# Patient Record
Sex: MALE | Race: ASIAN | Employment: FULL TIME | URBAN - METROPOLITAN AREA
[De-identification: names, ages, dates, MRNs, and addresses within clinical notes are randomized per-mention and may not be internally consistent; named-entity substitution may affect disease eponyms.]

---

## 2017-04-11 ENCOUNTER — OFFICE VISIT (OUTPATIENT)
Dept: FAMILY MEDICINE | Facility: CLINIC | Age: 42
End: 2017-04-11
Payer: COMMERCIAL

## 2017-04-11 VITALS
HEART RATE: 74 BPM | OXYGEN SATURATION: 97 % | DIASTOLIC BLOOD PRESSURE: 84 MMHG | SYSTOLIC BLOOD PRESSURE: 132 MMHG | WEIGHT: 242 LBS | TEMPERATURE: 97.2 F | BODY MASS INDEX: 40.32 KG/M2 | RESPIRATION RATE: 16 BRPM | HEIGHT: 65 IN

## 2017-04-11 DIAGNOSIS — I10 ESSENTIAL HYPERTENSION: ICD-10-CM

## 2017-04-11 DIAGNOSIS — R73.03 PREDIABETES: ICD-10-CM

## 2017-04-11 DIAGNOSIS — I10 ESSENTIAL HYPERTENSION, BENIGN: ICD-10-CM

## 2017-04-11 DIAGNOSIS — Z13.6 CARDIOVASCULAR SCREENING; LDL GOAL LESS THAN 130: ICD-10-CM

## 2017-04-11 DIAGNOSIS — M77.02 GOLFERS ELBOW OF LEFT UPPER EXTREMITY: ICD-10-CM

## 2017-04-11 DIAGNOSIS — E66.01 MORBID OBESITY, UNSPECIFIED OBESITY TYPE (H): ICD-10-CM

## 2017-04-11 DIAGNOSIS — M25.542 ARTHRALGIA OF LEFT HAND: ICD-10-CM

## 2017-04-11 DIAGNOSIS — Z71.6 ENCOUNTER FOR SMOKING CESSATION COUNSELING: ICD-10-CM

## 2017-04-11 DIAGNOSIS — Z72.0 TOBACCO ABUSE: ICD-10-CM

## 2017-04-11 DIAGNOSIS — R80.9 POSITIVE FOR MACROALBUMINURIA: ICD-10-CM

## 2017-04-11 DIAGNOSIS — Z00.00 ENCOUNTER FOR ROUTINE ADULT HEALTH EXAMINATION WITHOUT ABNORMAL FINDINGS: Primary | ICD-10-CM

## 2017-04-11 DIAGNOSIS — L81.0 HYPERPIGMENTATION OF SKIN, POSTINFLAMMATORY: ICD-10-CM

## 2017-04-11 LAB — HBA1C MFR BLD: 6 % (ref 4.3–6)

## 2017-04-11 PROCEDURE — 80061 LIPID PANEL: CPT | Performed by: PHYSICIAN ASSISTANT

## 2017-04-11 PROCEDURE — 99212 OFFICE O/P EST SF 10 MIN: CPT | Mod: 25 | Performed by: PHYSICIAN ASSISTANT

## 2017-04-11 PROCEDURE — 83036 HEMOGLOBIN GLYCOSYLATED A1C: CPT | Performed by: PHYSICIAN ASSISTANT

## 2017-04-11 PROCEDURE — 36415 COLL VENOUS BLD VENIPUNCTURE: CPT | Performed by: PHYSICIAN ASSISTANT

## 2017-04-11 PROCEDURE — 99396 PREV VISIT EST AGE 40-64: CPT | Performed by: PHYSICIAN ASSISTANT

## 2017-04-11 PROCEDURE — 82043 UR ALBUMIN QUANTITATIVE: CPT | Performed by: PHYSICIAN ASSISTANT

## 2017-04-11 PROCEDURE — 80048 BASIC METABOLIC PNL TOTAL CA: CPT | Performed by: PHYSICIAN ASSISTANT

## 2017-04-11 PROCEDURE — 84550 ASSAY OF BLOOD/URIC ACID: CPT | Performed by: PHYSICIAN ASSISTANT

## 2017-04-11 RX ORDER — ATENOLOL 25 MG/1
25 TABLET ORAL DAILY
Qty: 90 TABLET | Refills: 3 | Status: SHIPPED | OUTPATIENT
Start: 2017-04-11 | End: 2017-08-17

## 2017-04-11 RX ORDER — AMLODIPINE BESYLATE 5 MG/1
5 TABLET ORAL DAILY
Qty: 90 TABLET | Refills: 3 | Status: SHIPPED | OUTPATIENT
Start: 2017-04-11 | End: 2018-05-02

## 2017-04-11 RX ORDER — VARENICLINE TARTRATE 1 MG/1
1 TABLET, FILM COATED ORAL 2 TIMES DAILY
Qty: 56 TABLET | Refills: 3 | Status: SHIPPED | OUTPATIENT
Start: 2017-04-11 | End: 2018-06-25

## 2017-04-11 RX ORDER — LOSARTAN POTASSIUM 50 MG/1
50 TABLET ORAL DAILY
Qty: 90 TABLET | Refills: 3 | Status: SHIPPED | OUTPATIENT
Start: 2017-04-11 | End: 2017-04-14 | Stop reason: DRUGHIGH

## 2017-04-11 RX ORDER — MULTIPLE VITAMINS W/ MINERALS TAB 9MG-400MCG
1 TAB ORAL DAILY
COMMUNITY
End: 2018-06-25

## 2017-04-11 NOTE — MR AVS SNAPSHOT
After Visit Summary   4/11/2017    Efrain Lock    MRN: [id number]           Patient Information     Date Of Birth          1975        Visit Information        Provider Department      4/11/2017 11:00 AM Negin Patel PA-C [facility]        Today's Diagnoses     CARDIOVASCULAR SCREENING; LDL GOAL LESS THAN 130    -  1    Essential hypertension, benign        Positive for macroalbuminuria        Prediabetes        Essential hypertension        Encounter for smoking cessation counseling        Tobacco abuse        Arthralgia of left hand          Care Instructions      Preventive Health Recommendations  Male Ages 40 to 49    Yearly exam:             See your health care provider every year in order to  o   Review health changes.   o   Discuss preventive care.    o   Review your medicines if your doctor has prescribed any.    You should be tested each year for STDs (sexually transmitted diseases) if you re at risk.     Have a cholesterol test every 5 years.     Have a colonoscopy (test for colon cancer) if someone in your family has had colon cancer or polyps before age 50.     After age 45, have a diabetes test (fasting glucose). If you are at risk for diabetes, you should have this test every 3 years.      Talk with your health care provider about whether or not a prostate cancer screening test (PSA) is right for you.    Shots: Get a flu shot each year. Get a tetanus shot every 10 years.     Nutrition:    Eat at least 5 servings of fruits and vegetables daily.     Eat whole-grain bread, whole-wheat pasta and brown rice instead of white grains and rice.     Talk to your provider about Calcium and Vitamin D.     Lifestyle    Exercise for at least 150 minutes a week (30 minutes a day, 5 days a week). This will help you control your weight and prevent disease.     Limit alcohol to one drink per day.     No smoking.     Wear sunscreen to prevent skin cancer.     See your  "dentist every six months for an exam and cleaning.            Follow-ups after your visit        Who to contact     If you have questions or need follow up information about today's clinic visit or your schedule please contact [facility] [place] directly at [phone].  Normal or non-critical lab and imaging results will be communicated to you by MyChart, letter or phone within 4 business days after the clinic has received the results. If you do not hear from us within 7 days, please contact the clinic through [employer] or phone. If you have a critical or abnormal lab result, we will notify you by phone as soon as possible.  Submit refill requests through [employer] or call your pharmacy and they will forward the refill request to us. Please allow 3 business days for your refill to be completed.          Additional Information About Your Visit        MyChart Information     [employer] gives you secure access to your electronic health record. If you see a primary care provider, you can also send messages to your care team and make appointments. If you have questions, please call your primary care clinic.  If you do not have a primary care provider, please call [phone] and they will assist you.        Care EveryWhere ID     This is your Care EveryWhere ID. This could be used by other organizations to access your [facility] medical records  [id number]        Your Vitals Were     Pulse Temperature Respirations Height Pulse Oximetry BMI (Body Mass Index)    74 97.2  F (36.2  C) 16 5' 5\" (1.651 m) 97% 40.27 kg/m2       Blood Pressure from Last 3 Encounters:   04/11/17 132/84   12/05/16 138/88   07/11/16 132/84    Weight from Last 3 Encounters:   04/11/17 242 lb (109.8 kg)   12/05/16 246 lb 3.2 oz (111.7 kg)   07/11/16 239 lb (108.4 kg)              We Performed the Following     Albumin Random Urine Quantitative     Basic metabolic panel  (Ca, Cl, CO2, Creat, Gluc, K, Na, BUN)     Hemoglobin A1c     Lipid " Profile with reflex to direct LDL     Uric acid          Where to get your medicines      These medications were sent to [id number] - [place], [place] - [address] AT [facility] [address], [place] [address]     Phone:  [phone]     amLODIPine 5 MG tablet    atenolol 25 MG tablet    losartan 50 MG tablet    varenicline 0.5 MG X 11 & 1 MG X 42 tablet    varenicline 1 MG tablet          Primary Care Provider Office Phone # Fax #    Negin Patel PA-C [phone]       [facility] [address]  [place] [address]        Thank you!     [facility]. Our goal is always to provide you with excellent care. Hearing back from our patients is one way we can continue to improve our services. Please take a few minutes to complete the written survey that you may receive in the mail after your visit with us. Thank you!             Your Updated Medication List - Protect others around you: Learn how to safely use, store and throw away your medicines at www.disposemymeds.org.          This list is accurate as of: 4/11/17 11:12 AM.  Always use your most recent med list.                   Brand Name Dispense Instructions for use    amLODIPine 5 MG tablet    NORVASC    90 tablet    Take 1 tablet (5 mg) by mouth daily       atenolol 25 MG tablet    TENORMIN    90 tablet    Take 1 tablet (25 mg) by mouth daily       losartan 50 MG tablet    COZAAR    90 tablet    Take 1 tablet (50 mg) by mouth daily       Multi-vitamin Tabs tablet      Take 1 tablet by mouth daily       POTASSIUM CITRATE PO          * varenicline 1 MG tablet    CHANTIX    56 tablet    Take 1 tablet (1 mg) by mouth 2 times daily       * varenicline 0.5 MG X 11 & 1 MG X 42 tablet    CHANTIX STARTING MONTH DOROTHY    53 tablet    Take 0.5 mg tab daily for 3 days, then 0.5 mg tab twice daily for 4 days, then 1 mg twice daily.        * Notice:  This list has 2 medication(s) that are the same as other medications prescribed for you. Read the directions carefully, and ask your doctor or other care provider to review them with you.

## 2017-04-11 NOTE — PROGRESS NOTES
"Chief Complaint   Patient presents with     Physical       Initial /84  Pulse 74  Temp 97.2  F (36.2  C)  Resp 16  Ht 5' 5\" (1.651 m)  Wt 242 lb (109.8 kg)  SpO2 97%  BMI 40.27 kg/m2 Estimated body mass index is 40.27 kg/(m^2) as calculated from the following:    Height as of this encounter: 5' 5\" (1.651 m).    Weight as of this encounter: 242 lb (109.8 kg).  Medication Reconciliation: complete. PABLO Melara LPN      SUBJECTIVE:     CC: Efrain Lock is an 41 year old male who presents for preventative health visit.     Healthy Habits:    Do you get at least three servings of calcium containing foods daily (dairy, green leafy vegetables, etc.)? yes    Amount of exercise or daily activities, outside of work: 3-4 day(s) per week    Problems taking medications regularly No    Medication side effects: No    Have you had an eye exam in the past two years? yes    Do you see a dentist twice per year? yes    Do you have sleep apnea, excessive snoring or daytime drowsiness?  Does snore    Interested in Chantix again. He quit with it, but then didn't get the refill and started back up. Wants to try again.     Says left hand has been sore - wants uric acid level checked.     Left medial elbow pain - has a while back, got better with injections, but has been lifting weights a lot and it flared it up.       -------------------------------------    Today's PHQ-2 Score:   PHQ-2 ( 1999 Pfizer) 4/11/2017 10/22/2015   Q1: Little interest or pleasure in doing things 0 0   Q2: Feeling down, depressed or hopeless 0 0   PHQ-2 Score 0 0       Abuse: Current or Past(Physical, Sexual or Emotional)- No  Do you feel safe in your environment - Yes    Social History   Substance Use Topics     Smoking status: Current Every Day Smoker     Packs/day: 0.30     Types: Cigarettes     Last attempt to quit: 7/22/2013     Smokeless tobacco: Never Used     Alcohol use No     The patient does not drink >3 drinks per day nor >7 drinks per " week.    Last PSA: No results found for: PSA    Recent Labs   Lab Test  10/24/14   1036  10/29/13   1026   CHOL  219*  242*   HDL  39*  38*   LDL  137*  142*   TRIG  213*  311*   CHOLHDLRATIO  5.6*  6.4*       Reviewed orders with patient. Reviewed health maintenance and updated orders accordingly - Yes    PABLO Melara LPN        Reviewed and updated as needed this visit by clinical staff  Tobacco  Allergies  Meds  Fam Hx  Soc Hx        Reviewed and updated as needed this visit by Provider            ROS:  C: NEGATIVE for fever, chills, change in weight  I: NEGATIVE for worrisome rashes, moles or lesions  E: NEGATIVE for vision changes or irritation  ENT: NEGATIVE for ear, mouth and throat problems  R: NEGATIVE for significant cough or SOB  CV: NEGATIVE for chest pain, palpitations or peripheral edema  GI: NEGATIVE for nausea, abdominal pain, heartburn, or change in bowel habits   male: negative for dysuria, hematuria, decreased urinary stream, erectile dysfunction, urethral discharge  M: SEE ABOVE.   N: NEGATIVE for weakness, dizziness or paresthesias  P: NEGATIVE for changes in mood or affect    Problem list, Medication list, Allergies, and Medical/Social/Surgical histories reviewed in [facility] and updated as appropriate.  Labs reviewed in EPIC  BP Readings from Last 3 Encounters:   04/11/17 132/84   12/05/16 138/88   07/11/16 132/84    Wt Readings from Last 3 Encounters:   04/11/17 242 lb (109.8 kg)   12/05/16 246 lb 3.2 oz (111.7 kg)   07/11/16 239 lb (108.4 kg)                  Patient Active Problem List   Diagnosis     Tobacco abuse     Essential hypertension, benign     Hyperpigmentation of skin, postinflammatory     Positive for macroalbuminuria     CARDIOVASCULAR SCREENING; LDL GOAL LESS THAN 130     Medial epicondylitis of both elbows     Radial styloid tenosynovitis of right hand     Right wrist pain     Prediabetes     BMI > 40     Past Surgical History:   Procedure Laterality Date     SURGICAL HISTORY  "OF -       rt knee surgery       Social History   Substance Use Topics     Smoking status: Current Every Day Smoker     Packs/day: 0.30     Types: Cigarettes     Last attempt to quit: 7/22/2013     Smokeless tobacco: Never Used     Alcohol use No     Family History   Problem Relation Age of Onset     Prostate Cancer Father      Hypertension Paternal Grandmother      Colon Cancer No family hx of      DIABETES No family hx of      Myocardial Infarction No family hx of          Current Outpatient Prescriptions   Medication Sig Dispense Refill     losartan (COZAAR) 100 MG tablet Take 1 tablet (100 mg) by mouth daily 90 tablet 3     multivitamin, therapeutic with minerals (MULTI-VITAMIN) TABS tablet Take 1 tablet by mouth daily       POTASSIUM CITRATE PO        atenolol (TENORMIN) 25 MG tablet Take 1 tablet (25 mg) by mouth daily 90 tablet 3     amLODIPine (NORVASC) 5 MG tablet Take 1 tablet (5 mg) by mouth daily 90 tablet 3     varenicline (CHANTIX) 1 MG tablet Take 1 tablet (1 mg) by mouth 2 times daily 56 tablet 3     varenicline (CHANTIX STARTING MONTH DOROTHY) 0.5 MG X 11 & 1 MG X 42 tablet Take 0.5 mg tab daily for 3 days, then 0.5 mg tab twice daily for 4 days, then 1 mg twice daily. 53 tablet 0     [DISCONTINUED] losartan (COZAAR) 50 MG tablet Take 1 tablet (50 mg) by mouth daily 90 tablet 3     Allergies   Allergen Reactions     Lisinopril Cough     OBJECTIVE:     /84  Pulse 74  Temp 97.2  F (36.2  C)  Resp 16  Ht 5' 5\" (1.651 m)  Wt 242 lb (109.8 kg)  SpO2 97%  BMI 40.27 kg/m2  EXAM:  GENERAL: healthy, alert and no distress  EYES: Eyes grossly normal to inspection, PERRL and conjunctivae and sclerae normal  HENT: ear canals and TM's normal, nose and mouth without ulcers or lesions  NECK: no adenopathy, no asymmetry, masses, or scars and thyroid normal to palpation  RESP: lungs clear to auscultation - no rales, rhonchi or wheezes  BREAST: normal without masses, tenderness or nipple discharge and no " palpable axillary masses or adenopathy  CV: regular rate and rhythm, normal S1 S2, no S3 or S4, no murmur, click or rub, no peripheral edema and peripheral pulses strong  ABDOMEN: soft, nontender, no hepatosplenomegaly, no masses and bowel sounds normal  MS: no gross musculoskeletal defects noted, no edema  SKIN: no suspicious lesions or rashes  NEURO: Normal strength and tone, mentation intact and speech normal  PSYCH: mentation appears normal, affect normal/bright    Results for orders placed or performed in visit on 04/11/17   Albumin Random Urine Quantitative   Result Value Ref Range    Creatinine Urine 275 mg/dL    Albumin Urine mg/L 1890 mg/L    Albumin Urine mg/g Cr 687.27 (H) 0 - 17 mg/g Cr   Hemoglobin A1c   Result Value Ref Range    Hemoglobin A1C 6.0 4.3 - 6.0 %   Lipid Profile with reflex to direct LDL   Result Value Ref Range    Cholesterol 205 (H) <200 mg/dL    Triglycerides 282 (H) <150 mg/dL    HDL Cholesterol 40 >39 mg/dL    LDL Cholesterol Calculated 109 (H) <100 mg/dL    Non HDL Cholesterol 165 (H) <130 mg/dL   Basic metabolic panel  (Ca, Cl, CO2, Creat, Gluc, K, Na, BUN)   Result Value Ref Range    Sodium 142 133 - 144 mmol/L    Potassium 3.6 3.4 - 5.3 mmol/L    Chloride 106 94 - 109 mmol/L    Carbon Dioxide 28 20 - 32 mmol/L    Anion Gap 8 3 - 14 mmol/L    Glucose 98 70 - 99 mg/dL    Urea Nitrogen 10 7 - 30 mg/dL    Creatinine 1.03 0.66 - 1.25 mg/dL    GFR Estimate 78 >60 mL/min/1.7m2    GFR Estimate If Black >90   GFR Calc   >60 mL/min/1.7m2    Calcium 8.7 8.5 - 10.1 mg/dL   Uric acid   Result Value Ref Range    Uric Acid 6.1 3.5 - 7.2 mg/dL         ASSESSMENT/PLAN:     Efrain was seen today for physical.    Diagnoses and all orders for this visit:    Encounter for routine adult health examination without abnormal findings    CARDIOVASCULAR SCREENING; LDL GOAL LESS THAN 130  -     Lipid Profile with reflex to direct LDL    Essential hypertension, benign  -     Albumin Random  "Urine Quantitative  -     atenolol (TENORMIN) 25 MG tablet; Take 1 tablet (25 mg) by mouth daily  -     amLODIPine (NORVASC) 5 MG tablet; Take 1 tablet (5 mg) by mouth daily  -     Basic metabolic panel  (Ca, Cl, CO2, Creat, Gluc, K, Na, BUN)  Increase losartan from 50 mg to 100 mg daily due to borderline blood pressure and elevated urine protein.     Positive for macroalbuminuria  -     Albumin Random Urine Quantitative    Prediabetes  -     Hemoglobin A1c  -     Basic metabolic panel  (Ca, Cl, CO2, Creat, Gluc, K, Na, BUN)  Work on LSMs.     Essential hypertension  -     Discontinue: losartan (COZAAR) 50 MG tablet; Take 1 tablet (50 mg) by mouth daily  -     losartan (COZAAR) 100 MG tablet; Take 1 tablet (100 mg) by mouth daily    Encounter for smoking cessation counseling  -     varenicline (CHANTIX) 1 MG tablet; Take 1 tablet (1 mg) by mouth 2 times daily    Tobacco abuse  -     varenicline (CHANTIX STARTING MONTH PAK) 0.5 MG X 11 & 1 MG X 42 tablet; Take 0.5 mg tab daily for 3 days, then 0.5 mg tab twice daily for 4 days, then 1 mg twice daily.    Arthralgia of left hand  -     Uric acid  Normal levels, likely strain.    Golfers elbow of left upper extremity  Patient will try other weight lifting maneuvers and NSAIDs.     Hyperpigmentation of skin, postinflammatory  Removed with reconstructive surgery.     Morbid obesity, unspecified obesity type (H)  Works on LSMs for weight loss.         COUNSELING:  Reviewed preventive health counseling, as reflected in patient instructions  Special attention given to:        Regular exercise       Healthy diet/nutrition       Vision screening         reports that he has been smoking Cigarettes.  He has been smoking about 0.30 packs per day. He has never used smokeless tobacco.  Tobacco Cessation Action Plan: Pharmacotherapies : Chantix  Estimated body mass index is 40.27 kg/(m^2) as calculated from the following:    Height as of this encounter: 5' 5\" (1.651 m).    Weight as " of this encounter: 242 lb (109.8 kg).   Weight management plan: Discussed healthy diet and exercise guidelines and patient will follow up in 12 months in clinic to re-evaluate.    Counseling Resources:  ATP IV Guidelines  Pooled Cohorts Equation Calculator  FRAX Risk Assessment  ICSI Preventive Guidelines  Dietary Guidelines for Americans, 2010  USDA's MyPlate  ASA Prophylaxis  Lung CA Screening    Negin Patel PA-C  [facility]

## 2017-04-12 LAB
ANION GAP SERPL CALCULATED.3IONS-SCNC: 8 MMOL/L (ref 3–14)
BUN SERPL-MCNC: 10 MG/DL (ref 7–30)
CALCIUM SERPL-MCNC: 8.7 MG/DL (ref 8.5–10.1)
CHLORIDE SERPL-SCNC: 106 MMOL/L (ref 94–109)
CHOLEST SERPL-MCNC: 205 MG/DL
CO2 SERPL-SCNC: 28 MMOL/L (ref 20–32)
CREAT SERPL-MCNC: 1.03 MG/DL (ref 0.66–1.25)
CREAT UR-MCNC: 275 MG/DL
GFR SERPL CREATININE-BSD FRML MDRD: 78 ML/MIN/1.7M2
GLUCOSE SERPL-MCNC: 98 MG/DL (ref 70–99)
HDLC SERPL-MCNC: 40 MG/DL
LDLC SERPL CALC-MCNC: 109 MG/DL
MICROALBUMIN UR-MCNC: 1890 MG/L
MICROALBUMIN/CREAT UR: 687.27 MG/G CR (ref 0–17)
NONHDLC SERPL-MCNC: 165 MG/DL
POTASSIUM SERPL-SCNC: 3.6 MMOL/L (ref 3.4–5.3)
SODIUM SERPL-SCNC: 142 MMOL/L (ref 133–144)
TRIGL SERPL-MCNC: 282 MG/DL
URATE SERPL-MCNC: 6.1 MG/DL (ref 3.5–7.2)

## 2017-04-13 ENCOUNTER — TELEPHONE (OUTPATIENT)
Dept: FAMILY MEDICINE | Facility: CLINIC | Age: 42
End: 2017-04-13

## 2017-04-13 PROBLEM — E66.01 MORBID OBESITY (H): Status: ACTIVE | Noted: 2017-04-13

## 2017-04-14 RX ORDER — LOSARTAN POTASSIUM 100 MG/1
100 TABLET ORAL DAILY
Qty: 90 TABLET | Refills: 3 | Status: SHIPPED | OUTPATIENT
Start: 2017-04-14 | End: 2018-05-07

## 2017-04-18 ENCOUNTER — TELEPHONE (OUTPATIENT)
Dept: FAMILY MEDICINE | Facility: CLINIC | Age: 42
End: 2017-04-18

## 2017-04-18 NOTE — TELEPHONE ENCOUNTER
Received notice from [employer] that PA for Chantix has been denied. Will check with GENESIS Aburto As what to do next. PABLO Melara LPN

## 2017-04-20 ENCOUNTER — TELEPHONE (OUTPATIENT)
Dept: FAMILY MEDICINE | Facility: CLINIC | Age: 42
End: 2017-04-20

## 2017-04-20 NOTE — TELEPHONE ENCOUNTER
Spoke with patient today regarding Chantix rx NOT being covered by Ins. They want patient to try 2 other rx's and fail first. Patient states he has tried Nicorette gum and patches without success. I do not see previous rx's of bupropion or zyban. Patient keeps saying they don't help. History of using Chantix in the past with success so that is what he wants. He will call his Ins. Company and get back to us. PABLO Melara LPN

## 2017-08-02 ENCOUNTER — TELEPHONE (OUTPATIENT)
Dept: FAMILY MEDICINE | Facility: CLINIC | Age: 42
End: 2017-08-02

## 2017-08-02 DIAGNOSIS — I10 BENIGN ESSENTIAL HYPERTENSION: Primary | ICD-10-CM

## 2017-08-02 NOTE — TELEPHONE ENCOUNTER
Shruthi sent a fax stating Atenolol is on back order and unavailable until the end of this month at least. Would you like to change medication to something else?  Delfina Garcia, MINNIE

## 2017-08-03 RX ORDER — METOPROLOL SUCCINATE 50 MG/1
50 TABLET, EXTENDED RELEASE ORAL DAILY
Qty: 90 TABLET | Refills: 3 | Status: SHIPPED | OUTPATIENT
Start: 2017-08-03 | End: 2018-06-25

## 2017-08-03 NOTE — TELEPHONE ENCOUNTER
We can change to metoprolol succinate 50mg daily; I sent that over.  Since his losartan was increased at his last visit with Claudia and we are switching to metoprolol, why doesn't he make a follow up appt with Claudia in a few weeks to follow up on BP, weight, and smoking cessation.     Sammie Vivar MD

## 2017-08-17 ENCOUNTER — OFFICE VISIT (OUTPATIENT)
Dept: FAMILY MEDICINE | Facility: CLINIC | Age: 42
End: 2017-08-17
Payer: COMMERCIAL

## 2017-08-17 VITALS
SYSTOLIC BLOOD PRESSURE: 120 MMHG | RESPIRATION RATE: 16 BRPM | TEMPERATURE: 98.2 F | OXYGEN SATURATION: 97 % | DIASTOLIC BLOOD PRESSURE: 76 MMHG | HEART RATE: 68 BPM | BODY MASS INDEX: 40.48 KG/M2 | HEIGHT: 65 IN | WEIGHT: 243 LBS

## 2017-08-17 DIAGNOSIS — R73.03 PREDIABETES: ICD-10-CM

## 2017-08-17 DIAGNOSIS — Z72.0 TOBACCO ABUSE: ICD-10-CM

## 2017-08-17 DIAGNOSIS — E78.5 HYPERLIPIDEMIA LDL GOAL <130: ICD-10-CM

## 2017-08-17 DIAGNOSIS — E66.01 MORBID OBESITY, UNSPECIFIED OBESITY TYPE (H): ICD-10-CM

## 2017-08-17 DIAGNOSIS — I10 ESSENTIAL HYPERTENSION, BENIGN: Primary | ICD-10-CM

## 2017-08-17 DIAGNOSIS — R80.9 POSITIVE FOR MACROALBUMINURIA: ICD-10-CM

## 2017-08-17 LAB — HBA1C MFR BLD: 6 % (ref 4.3–6)

## 2017-08-17 PROCEDURE — 36415 COLL VENOUS BLD VENIPUNCTURE: CPT | Performed by: PHYSICIAN ASSISTANT

## 2017-08-17 PROCEDURE — 82043 UR ALBUMIN QUANTITATIVE: CPT | Performed by: PHYSICIAN ASSISTANT

## 2017-08-17 PROCEDURE — 80048 BASIC METABOLIC PNL TOTAL CA: CPT | Performed by: PHYSICIAN ASSISTANT

## 2017-08-17 PROCEDURE — 99213 OFFICE O/P EST LOW 20 MIN: CPT | Performed by: PHYSICIAN ASSISTANT

## 2017-08-17 PROCEDURE — 80061 LIPID PANEL: CPT | Performed by: PHYSICIAN ASSISTANT

## 2017-08-17 PROCEDURE — 83036 HEMOGLOBIN GLYCOSYLATED A1C: CPT | Performed by: PHYSICIAN ASSISTANT

## 2017-08-17 NOTE — PROGRESS NOTES
"Chief Complaint   Patient presents with     Hypertension     Recheck Medication       Initial /76  Pulse 68  Temp 98.2  F (36.8  C)  Resp 16  Ht 5' 5\" (1.651 m)  Wt 243 lb (110.2 kg)  SpO2 97%  BMI 40.44 kg/m2 Estimated body mass index is 40.44 kg/(m^2) as calculated from the following:    Height as of this encounter: 5' 5\" (1.651 m).    Weight as of this encounter: 243 lb (110.2 kg).  Medication Reconciliation: complete. PABLO Melara LPN          SUBJECTIVE:                                                    Efrain Lcok is a 42 year old male who presents to clinic today for the following health issues:      Hypertension Follow-up      Outpatient blood pressures are being checked at home.  Results are 110/70.    Low Salt Diet: not monitoring salt        Amount of exercise or physical activity: 1 day/week for an average of 45-60 minutes    Problems taking medications regularly: No    Medication side effects: none  Diet: regular (no restrictions)    Says he quit smoking for 4-6 wks then didn't fill his Chantix, so started smoking again. Doesn't want chantix anymore, says it wasn't working. Says he will take up kickboxing and quit this on his own. Smoking 1 Pack every 4-5 days - has cut back.   Has not lost weight, will be starting to exercise with his son.   Fasting for labs recheck.   blood pressure better.     -------------------------------------    Problem list and histories reviewed & adjusted, as indicated.  Additional history: as documented    Patient Active Problem List   Diagnosis     Tobacco abuse     Essential hypertension, benign     Hyperpigmentation of skin, postinflammatory     Positive for macroalbuminuria     CARDIOVASCULAR SCREENING; LDL GOAL LESS THAN 130     Medial epicondylitis of both elbows     Prediabetes     BMI > 40     Past Surgical History:   Procedure Laterality Date     SURGICAL HISTORY OF -       rt knee surgery       Social History   Substance Use Topics     Smoking status: " Current Every Day Smoker     Packs/day: 0.30     Types: Cigarettes     Last attempt to quit: 7/22/2013     Smokeless tobacco: Never Used      Comment: stopped x 4-6 wks and restarted      Alcohol use No     Family History   Problem Relation Age of Onset     Prostate Cancer Father      Hypertension Paternal Grandmother      Colon Cancer No family hx of      DIABETES No family hx of      Myocardial Infarction No family hx of          Current Outpatient Prescriptions   Medication Sig Dispense Refill     metoprolol (TOPROL-XL) 50 MG 24 hr tablet Take 1 tablet (50 mg) by mouth daily 90 tablet 3     losartan (COZAAR) 100 MG tablet Take 1 tablet (100 mg) by mouth daily 90 tablet 3     multivitamin, therapeutic with minerals (MULTI-VITAMIN) TABS tablet Take 1 tablet by mouth daily       amLODIPine (NORVASC) 5 MG tablet Take 1 tablet (5 mg) by mouth daily 90 tablet 3     varenicline (CHANTIX) 1 MG tablet Take 1 tablet (1 mg) by mouth 2 times daily (Patient not taking: Reported on 8/17/2017) 56 tablet 3     varenicline (CHANTIX STARTING MONTH PAK) 0.5 MG X 11 & 1 MG X 42 tablet Take 0.5 mg tab daily for 3 days, then 0.5 mg tab twice daily for 4 days, then 1 mg twice daily. (Patient not taking: Reported on 8/17/2017) 53 tablet 0     Allergies   Allergen Reactions     Lisinopril Cough     Recent Labs   Lab Test  08/17/17   1021  04/11/17   1139  12/05/16   1124  10/22/15   1110  10/24/14   1036  10/29/13   1026 06/29/11  01/14/10   A1C  6.0  6.0   --   5.9   --    --    --    --    --    LDL   --   109*   --    --   137*  142*  109   < >   --    HDL   --   40   --    --   39*  38*  40   < >   --    TRIG   --   282*   --    --   213*  311*  304   < >   --    ALT   --    --    --    --    --    --   54   --    --    CR   --   1.03  0.92  0.98  1.06  0.97  1.0   < >  1.1   GFRESTIMATED   --   78  89  84  77  86  >60   < >  >60   GFRESTBLACK   --   >90   GFR Calc    >90   GFR Calc    >90    "American GFR Calc    >90   GFR Calc    >90  >60   < >  >60   POTASSIUM   --   3.6  3.7  3.5  3.5  3.3*  3.7   < >  4.7   TSH   --    --    --   1.14   --    --    --    --   1.81    < > = values in this interval not displayed.      BP Readings from Last 3 Encounters:   08/17/17 120/76   04/11/17 132/84   12/05/16 138/88    Wt Readings from Last 3 Encounters:   08/17/17 243 lb (110.2 kg)   04/11/17 242 lb (109.8 kg)   12/05/16 246 lb 3.2 oz (111.7 kg)                  Labs reviewed in EPIC          Reviewed and updated as needed this visit by clinical staffTobacco  Allergies  Meds  Fam Hx  Soc Hx      Reviewed and updated as needed this visit by Provider         Social History     Social History     Marital status:      Spouse name:      Number of children: 2     Years of education: N/A     Occupational History     nursing assistant      Social History Main Topics     Smoking status: Current Every Day Smoker     Packs/day: 0.30     Types: Cigarettes     Last attempt to quit: 7/22/2013     Smokeless tobacco: Never Used      Comment: stopped x 4-6 wks and restarted      Alcohol use No     Drug use: No     Sexual activity: Yes     Partners: Female     Other Topics Concern     None     Social History Narrative       10 point review of systems negative other than symptoms noted above.   Constitutional, HEENT, CV, pulmonary, GI, , MS, Endo, Psych systems are all negative, except as otherwise noted.       OBJECTIVE:  /76  Pulse 68  Temp 98.2  F (36.8  C)  Resp 16  Ht 5' 5\" (1.651 m)  Wt 243 lb (110.2 kg)  SpO2 97%  BMI 40.44 kg/m2  CONSTITUTIONAL: Alert, well-nourished, well-groomed, NAD  RESP: Lungs CTA. No wheeze, rhonchi, rales. Normal effort on room air. Equal lung sounds bilaterally.   CV: HRRR, normal S1, S2. No MRG. No peripheral edema.  DERM: No rashes or suspicious lesions    Diagnostic Tests:  Results for orders placed or performed in visit on 08/17/17   Hemoglobin " A1c   Result Value Ref Range    Hemoglobin A1C 6.0 4.3 - 6.0 %         ASSESSMENT/PLAN:  (I10) Essential hypertension, benign  (primary encounter diagnosis)  Comment: improved, continue plan.   Plan: Basic metabolic panel  (Ca, Cl, CO2, Creat,         Gluc, K, Na, BUN), Albumin Random Urine         Quantitative            (R80.9) Positive for macroalbuminuria  Comment:   Plan: recheck    (Z72.0) Tobacco abuse  Comment:   Plan: Patient was seen today and tobacco cessation was discussed. See plan above.       (E66.01) Morbid obesity, unspecified obesity type (H)  Comment:   Plan: Weight management plan: Discussed healthy diet and exercise guidelines and patient will follow up in 3 months in clinic to re-evaluate.      (R73.03) Prediabetes  Comment: plan above.   Plan: Basic metabolic panel  (Ca, Cl, CO2, Creat,         Gluc, K, Na, BUN), Albumin Random Urine         Quantitative, Hemoglobin A1c            (E78.5) Hyperlipidemia LDL goal <130  Comment: recheck. Consider statin. Last ASCVD score was 7.7%  Plan: Lipid panel reflex to direct LDL              FOLLOW-UP: Routinely and sooner as needed.  The patient agrees with this assessment and plan and agrees to call or return to the clinic with any questions or concerns or if their condition worsens.    ELIZABETH Rasheed, PA-C  [facility]

## 2017-08-17 NOTE — MR AVS SNAPSHOT
After Visit Summary   8/17/2017    Efrain Lock    MRN: [id number]           Patient Information     Date Of Birth          1975        Visit Information        Provider Department      8/17/2017 10:20 AM Negin Patel PA-C [facility] [place]        Today's Diagnoses     Essential hypertension, benign    -  1    Positive for macroalbuminuria        Tobacco abuse        Morbid obesity, unspecified obesity type (H)        Prediabetes        Hyperlipidemia LDL goal <130           Follow-ups after your visit        Follow-up notes from your care team     Return in about 6 months (around 2/17/2018) for Medication Check, Routine Visit, BP Recheck.      Who to contact     If you have questions or need follow up information about today's clinic visit or your schedule please contact [facility] [place] directly at [phone].  Normal or non-critical lab and imaging results will be communicated to you by Reactivityhart, letter or phone within 4 business days after the clinic has received the results. If you do not hear from us within 7 days, please contact the clinic through Reactivityhart or phone. If you have a critical or abnormal lab result, we will notify you by phone as soon as possible.  Submit refill requests through Trenergi or call your pharmacy and they will forward the refill request to us. Please allow 3 business days for your refill to be completed.          Additional Information About Your Visit        MyChart Information     Trenergi gives you secure access to your electronic health record. If you see a primary care provider, you can also send messages to your care team and make appointments. If you have questions, please call your primary care clinic.  If you do not have a primary care provider, please call [phone] and they will assist you.        Care EveryWhere ID     This is your Care EveryWhere ID. This could be used by other organizations to access your  "[facility] medical records  [id number]        Your Vitals Were     Pulse Temperature Respirations Height Pulse Oximetry BMI (Body Mass Index)    68 98.2  F (36.8  C) 16 5' 5\" (1.651 m) 97% 40.44 kg/m2       Blood Pressure from Last 3 Encounters:   08/17/17 120/76   04/11/17 132/84   12/05/16 138/88    Weight from Last 3 Encounters:   08/17/17 243 lb (110.2 kg)   04/11/17 242 lb (109.8 kg)   12/05/16 246 lb 3.2 oz (111.7 kg)              We Performed the Following     Albumin Random Urine Quantitative     Basic metabolic panel  (Ca, Cl, CO2, Creat, Gluc, K, Na, BUN)     Hemoglobin A1c     Lipid panel reflex to direct LDL        Primary Care Provider Office Phone # Fax #    Negin Patel PA-C [phone]       [address]  [place] [address]        Equal Access to Services     [facility]: Hadii aad ku hadasho Soomaali, waaxda luqadaha, qaybta kaalmada adeegyada, waxay idiin haymony khan . So [facility] [phone].    ATENCIÓN: Si habla español, tiene a mello disposición servicios gratuitos de asistencia lingüística. Llame al [phone].    We comply with applicable federal civil rights laws and [place] laws. We do not discriminate on the basis of race, color, national origin, age, disability sex, sexual orientation or gender identity.            Thank you!     Thank you for choosing [facility] [place]  for your care. Our goal is always to provide you with excellent care. Hearing back from our patients is one way we can continue to improve our services. Please take a few minutes to complete the written survey that you may receive in the mail after your visit with us. Thank you!             Your Updated Medication List - Protect others around you: Learn how to safely use, store and throw away your medicines at www.disposemymeds.org.          This list is accurate as of: 8/17/17 10:45 AM.  Always use your most recent med list.                   Brand Name " Dispense Instructions for use Diagnosis    amLODIPine 5 MG tablet    NORVASC    90 tablet    Take 1 tablet (5 mg) by mouth daily    Essential hypertension, benign       losartan 100 MG tablet    COZAAR    90 tablet    Take 1 tablet (100 mg) by mouth daily    Essential hypertension       metoprolol 50 MG 24 hr tablet    TOPROL-XL    90 tablet    Take 1 tablet (50 mg) by mouth daily    Benign essential hypertension       Multi-vitamin Tabs tablet      Take 1 tablet by mouth daily        * varenicline 1 MG tablet    CHANTIX    56 tablet    Take 1 tablet (1 mg) by mouth 2 times daily    Encounter for smoking cessation counseling       * varenicline 0.5 MG X 11 & 1 MG X 42 tablet    CHANTIX STARTING MONTH DOROTHY    53 tablet    Take 0.5 mg tab daily for 3 days, then 0.5 mg tab twice daily for 4 days, then 1 mg twice daily.    Tobacco abuse       * Notice:  This list has 2 medication(s) that are the same as other medications prescribed for you. Read the directions carefully, and ask your doctor or other care provider to review them with you.

## 2017-08-18 LAB
ANION GAP SERPL CALCULATED.3IONS-SCNC: 10 MMOL/L (ref 3–14)
BUN SERPL-MCNC: 12 MG/DL (ref 7–30)
CALCIUM SERPL-MCNC: 8.6 MG/DL (ref 8.5–10.1)
CHLORIDE SERPL-SCNC: 106 MMOL/L (ref 94–109)
CHOLEST SERPL-MCNC: 189 MG/DL
CO2 SERPL-SCNC: 23 MMOL/L (ref 20–32)
CREAT SERPL-MCNC: 0.93 MG/DL (ref 0.66–1.25)
CREAT UR-MCNC: 308 MG/DL
GFR SERPL CREATININE-BSD FRML MDRD: 88 ML/MIN/1.7M2
GLUCOSE SERPL-MCNC: 105 MG/DL (ref 70–99)
HDLC SERPL-MCNC: 34 MG/DL
LDLC SERPL CALC-MCNC: 83 MG/DL
MICROALBUMIN UR-MCNC: 1010 MG/L
MICROALBUMIN/CREAT UR: 327.92 MG/G CR (ref 0–17)
NONHDLC SERPL-MCNC: 155 MG/DL
POTASSIUM SERPL-SCNC: 3.4 MMOL/L (ref 3.4–5.3)
SODIUM SERPL-SCNC: 139 MMOL/L (ref 133–144)
TRIGL SERPL-MCNC: 358 MG/DL

## 2018-05-07 DIAGNOSIS — I10 ESSENTIAL HYPERTENSION: ICD-10-CM

## 2018-05-07 NOTE — TELEPHONE ENCOUNTER
"Requested Prescriptions   Pending Prescriptions Disp Refills     losartan (COZAAR) 100 MG tablet  Last Written Prescription Date:  4-  Last Fill Quantity: 90 tablet,  # refills: 3   Last office visit: 8/17/2017 with prescribing provider:  8-   Future Office Visit:     90 tablet 3     Sig: Take 1 tablet (100 mg) by mouth daily    Angiotensin-II Receptors Failed    5/7/2018  8:10 AM       Failed - Recent (12 mo) or future (30 days) visit within the authorizing provider's specialty    Patient had office visit in the last 12 months or has a visit in the next 30 days with authorizing provider or within the authorizing provider's specialty.  See \"Patient Info\" tab in inbasket, or \"Choose Columns\" in Meds & Orders section of the refill encounter.           Passed - Blood pressure under 140/90 in past 12 months    BP Readings from Last 3 Encounters:   08/17/17 120/76   04/11/17 132/84   12/05/16 138/88                Passed - Patient is age 18 or older       Passed - Normal serum creatinine on file in past 12 months    Recent Labs   Lab Test  08/17/17   1021   CR  0.93            Passed - Normal serum potassium on file in past 12 months    Recent Labs   Lab Test  08/17/17   1021   POTASSIUM  3.4                      "

## 2018-05-08 RX ORDER — LOSARTAN POTASSIUM 100 MG/1
100 TABLET ORAL DAILY
Qty: 30 TABLET | Refills: 0 | Status: SHIPPED | OUTPATIENT
Start: 2018-05-08 | End: 2018-06-25

## 2018-05-08 NOTE — TELEPHONE ENCOUNTER
30 day supply given.  Patient is due for an OV.  Please call and assist with scheduling appointment prior to next refill   Radha Peacock RN - Triage  [facility]

## 2018-06-19 ENCOUNTER — OFFICE VISIT (OUTPATIENT)
Dept: ORTHOPEDICS | Facility: CLINIC | Age: 43
End: 2018-06-19
Payer: COMMERCIAL

## 2018-06-19 ENCOUNTER — RADIANT APPOINTMENT (OUTPATIENT)
Dept: GENERAL RADIOLOGY | Facility: CLINIC | Age: 43
End: 2018-06-19
Attending: FAMILY MEDICINE
Payer: COMMERCIAL

## 2018-06-19 VITALS
BODY MASS INDEX: 40.48 KG/M2 | SYSTOLIC BLOOD PRESSURE: 142 MMHG | DIASTOLIC BLOOD PRESSURE: 94 MMHG | HEIGHT: 65 IN | WEIGHT: 243 LBS

## 2018-06-19 DIAGNOSIS — I10 ESSENTIAL HYPERTENSION, BENIGN: ICD-10-CM

## 2018-06-19 DIAGNOSIS — S83.411A SPRAIN OF MEDIAL COLLATERAL LIGAMENT OF RIGHT KNEE, INITIAL ENCOUNTER: ICD-10-CM

## 2018-06-19 DIAGNOSIS — M75.102 ROTATOR CUFF SYNDROME OF LEFT SHOULDER: ICD-10-CM

## 2018-06-19 DIAGNOSIS — M25.561 ARTHRALGIA OF RIGHT LOWER LEG: ICD-10-CM

## 2018-06-19 DIAGNOSIS — M25.561 ARTHRALGIA OF RIGHT LOWER LEG: Primary | ICD-10-CM

## 2018-06-19 DIAGNOSIS — M17.11 PRIMARY OSTEOARTHRITIS OF RIGHT KNEE: ICD-10-CM

## 2018-06-19 PROCEDURE — 73562 X-RAY EXAM OF KNEE 3: CPT | Mod: FY | Performed by: FAMILY MEDICINE

## 2018-06-19 PROCEDURE — 73562 X-RAY EXAM OF KNEE 3: CPT | Mod: FY

## 2018-06-19 PROCEDURE — 99214 OFFICE O/P EST MOD 30 MIN: CPT | Performed by: FAMILY MEDICINE

## 2018-06-19 ASSESSMENT — ENCOUNTER SYMPTOMS: NECK PAIN: 0

## 2018-06-19 NOTE — LETTER
6/19/2018         RE: Efrain Lock  [address] [address]        Dear Colleague,    Thank you for referring your patient, Efrain Lock, to the [facility] [place]. Please see a copy of my visit note below.    HPI   [facility]   Clinic Visit s Jun 19, 2018    PCP: [facility] [place]      Efrain is a 43 year old male who is seen as self referral for   Chief Complaint   Patient presents with     Right Knee - Pain     Left Shoulder - Pain       Injury: Patient describes injury as playing basketball and twisted knee. He injured he shoulder while working out.      right-hand dominant    Location of Pain: right, knee medial  and left shoulder superior, nonradiating   Duration of Pain: 3 week(s)  Rating of Pain at worst: 7/10  Rating of Pain Currently: 0/10  Pain is better with: activity avoidance and rest   Pain is worse with: overhead movement and twisting   Treatment so far consists of: ice  Associated symptoms: swelling Moderate  Recent imaging completed: No recent imaging completed.  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     Past Medical History:   Diagnosis Date     HTN (hypertension)      Microalbuminuria 10/30/2013     Tobacco abuse 7/12/2012       Patient Active Problem List    Diagnosis Date Noted     BMI > 40 04/13/2017     Priority: Medium     Prediabetes 04/11/2017     Priority: Medium     Medial epicondylitis of both elbows 01/04/2016     Priority: Medium     Hyperlipidemia LDL goal <130 10/24/2014     Priority: Medium     Positive for macroalbuminuria 10/30/2013     Priority: Medium     Hyperpigmentation of skin, postinflammatory 10/29/2013     Priority: Medium     Tobacco abuse 07/12/2012     Priority: Medium     Essential hypertension, benign 07/12/2012     Priority: Medium  "      Family History   Problem Relation Age of Onset     Prostate Cancer Father      Hypertension Paternal Grandmother      Colon Cancer No family hx of      Diabetes No family hx of      Myocardial Infarction No family hx of        Social History     Social History     Marital status:      Spouse name:      Number of children: 2     Years of education: N/A     Occupational History     nursing assistant      Social History Main Topics     Smoking status: Current Every Day Smoker     Packs/day: 0.30     Types: Cigarettes     Last attempt to quit: 7/22/2013     Smokeless tobacco: Never Used      Comment: stopped x 4-6 wks and restarted      Alcohol use No       Past Surgical History:   Procedure Laterality Date     SURGICAL HISTORY OF -       rt knee surgery       Review of Systems   Musculoskeletal: Positive for joint pain. Negative for neck pain.   All other systems reviewed and are negative.        Physical Exam   Musculoskeletal:        Right knee: MCL tenderness noted.     BP (!) 142/94  Ht 5' 5\" (1.651 m)  Wt 243 lb (110.2 kg)  BMI 40.44 kg/m2  Constitutional:well-developed, well-nourished, and in no distress.   Cardiovascular: Intact distal pulses.    Neurological: alert. Gait Abnormal:   Antalgic gait  Skin: Skin is warm and dry.   Psychiatric: Mood and affect normal.   Respiratory: unlabored, speaks in full sentences  Lymph: no LAD, no lymphangitis          Right Knee Exam   Swelling: None  Effusion: No    Tenderness   The patient is experiencing tenderness in the MCL.    Range of Motion   Extension: Normal  Flexion:     Normal    Tests   McMurrays:  Medial - Negative      Lateral - Negative  Lachman:  Anterior - Negative    Posterior - Negative  Drawer:       Anterior - Negative    Posterior - Negative  Varus:  Negative  Valgus: Positive  Pivot Shift: Negative  Patellar Apprehension: No    Left Shoulder Exam     Tenderness   None    Range of Motion   Active Abduction:                       " Normal  Passive Abduction:                    Normal  Extension:                                  Normal  Forward Flexion:                        180  External Rotation:                      90  Internal Rotation 0 degrees:      Normal  Internal Rotation 90 degrees:    Normal    Muscle Strength   Abduction:            5/5  Internal Rotation:  5/5  External Rotation: 5/5  Supraspinatus:     5/5  Subscapularis:     5/5  Biceps:                 5/5    Tests   Impingement:   Positive  Vega:          Negative  Cross Arm:      Negative  Drop Arm:        Positive  Apprehension: Negative  Sulcus:            Negative          X-ray images Ordered and independently reviewed by me in the office today with the patient. X-ray shows:   Recent Results (from the past 48 hour(s))   XR Knee Standing AP Bilat Raton Bilat Lat Right    Narrative    6/19/2018    Mild medial compartment joint space narrowing of the right knee, otherwise   normal knees with no significant arthritis, no loose bodies, no acute   fractures, normal alignment.     ASSESSMENT/PLAN    ICD-10-CM    1. Arthralgia of right lower leg M25.561 XR Knee Standing AP Bilat Raton Bilat Lat Right   2. Primary osteoarthritis of right knee M17.11    3. Sprain of medial collateral ligament of right knee, initial encounter S83.411A    4. Rotator cuff syndrome of left shoulder M75.102 [facility]   5. Essential hypertension, benign I10      Efrain does have some very early mild DJD of his right knee but he seems to have acutely injured his MCL ligament as well.  Discussed options including physical therapy bracing or cortisone injections.  For now he will start with bracing for 2-4 weeks and if pain does not resolve consider returning for a cortisone injection    Left shoulder consistent with rotator cuff syndrome.  Etiology uncertain.  Discussed options.  Cortisone injection not recommended at this time.  Formal physical therapy  ordered.    Patient to follow up with Primary Care provider regarding elevated blood pressure.    Again, thank you for allowing me to participate in the care of your patient.        Sincerely,        Satinder Bernard MD

## 2018-06-19 NOTE — PROGRESS NOTES
HPI   [facility]   Clinic Visit s Jun 19, 2018    PCP: Daylin uDtta Jordan Zuniga is a 43 year old male who is seen as self referral for   Chief Complaint   Patient presents with     Right Knee - Pain     Left Shoulder - Pain       Injury: Patient describes injury as playing basketball and twisted knee. He injured he shoulder while working out.      right-hand dominant    Location of Pain: right, knee medial  and left shoulder superior, nonradiating   Duration of Pain: 3 week(s)  Rating of Pain at worst: 7/10  Rating of Pain Currently: 0/10  Pain is better with: activity avoidance and rest   Pain is worse with: overhead movement and twisting   Treatment so far consists of: ice  Associated symptoms: swelling Moderate  Recent imaging completed: No recent imaging completed.  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     Past Medical History:   Diagnosis Date     HTN (hypertension)      Microalbuminuria 10/30/2013     Tobacco abuse 7/12/2012       Patient Active Problem List    Diagnosis Date Noted     BMI > 40 04/13/2017     Priority: Medium     Prediabetes 04/11/2017     Priority: Medium     Medial epicondylitis of both elbows 01/04/2016     Priority: Medium     Hyperlipidemia LDL goal <130 10/24/2014     Priority: Medium     Positive for macroalbuminuria 10/30/2013     Priority: Medium     Hyperpigmentation of skin, postinflammatory 10/29/2013     Priority: Medium     Tobacco abuse 07/12/2012     Priority: Medium     Essential hypertension, benign 07/12/2012     Priority: Medium       Family History   Problem Relation Age of Onset     Prostate Cancer Father      Hypertension Paternal Grandmother      Colon Cancer No family hx of      Diabetes No family hx of      Myocardial Infarction No family hx of        Social History     Social History     Marital  "status:      Spouse name:      Number of children: 2     Years of education: N/A     Occupational History     nursing assistant      Social History Main Topics     Smoking status: Current Every Day Smoker     Packs/day: 0.30     Types: Cigarettes     Last attempt to quit: 7/22/2013     Smokeless tobacco: Never Used      Comment: stopped x 4-6 wks and restarted      Alcohol use No       Past Surgical History:   Procedure Laterality Date     SURGICAL HISTORY OF -       rt knee surgery       Review of Systems   Musculoskeletal: Positive for joint pain. Negative for neck pain.   All other systems reviewed and are negative.        Physical Exam   Musculoskeletal:        Right knee: MCL tenderness noted.     BP (!) 142/94  Ht 5' 5\" (1.651 m)  Wt 243 lb (110.2 kg)  BMI 40.44 kg/m2  Constitutional:well-developed, well-nourished, and in no distress.   Cardiovascular: Intact distal pulses.    Neurological: alert. Gait Abnormal:   Antalgic gait  Skin: Skin is warm and dry.   Psychiatric: Mood and affect normal.   Respiratory: unlabored, speaks in full sentences  Lymph: no LAD, no lymphangitis          Right Knee Exam   Swelling: None  Effusion: No    Tenderness   The patient is experiencing tenderness in the MCL.    Range of Motion   Extension: Normal  Flexion:     Normal    Tests   McMurrays:  Medial - Negative      Lateral - Negative  Lachman:  Anterior - Negative    Posterior - Negative  Drawer:       Anterior - Negative    Posterior - Negative  Varus:  Negative  Valgus: Positive  Pivot Shift: Negative  Patellar Apprehension: No    Left Shoulder Exam     Tenderness   None    Range of Motion   Active Abduction:                       Normal  Passive Abduction:                    Normal  Extension:                                  Normal  Forward Flexion:                        180  External Rotation:                      90  Internal Rotation 0 degrees:      Normal  Internal Rotation 90 degrees:    " Normal    Muscle Strength   Abduction:            5/5  Internal Rotation:  5/5  External Rotation: 5/5  Supraspinatus:     5/5  Subscapularis:     5/5  Biceps:                 5/5    Tests   Impingement:   Positive  Vega:          Negative  Cross Arm:      Negative  Drop Arm:        Positive  Apprehension: Negative  Sulcus:            Negative          X-ray images Ordered and independently reviewed by me in the office today with the patient. X-ray shows:   Recent Results (from the past 48 hour(s))   XR Knee Standing AP Bilat Wetmore Bilat Lat Right    Narrative    6/19/2018    Mild medial compartment joint space narrowing of the right knee, otherwise   normal knees with no significant arthritis, no loose bodies, no acute   fractures, normal alignment.     ASSESSMENT/PLAN    ICD-10-CM    1. Arthralgia of right lower leg M25.561 XR Knee Standing AP Bilat [place] Bilat Lat Right   2. Primary osteoarthritis of right knee M17.11    3. Sprain of medial collateral ligament of right knee, initial encounter S83.411A    4. Rotator cuff syndrome of left shoulder M75.102 [facility]   5. Essential hypertension, benign I10      Efrain does have some very early mild DJD of his right knee but he seems to have acutely injured his MCL ligament as well.  Discussed options including physical therapy bracing or cortisone injections.  For now he will start with bracing for 2-4 weeks and if pain does not resolve consider returning for a cortisone injection    Left shoulder consistent with rotator cuff syndrome.  Etiology uncertain.  Discussed options.  Cortisone injection not recommended at this time.  Formal physical therapy ordered.    Patient to follow up with Primary Care provider regarding elevated blood pressure.

## 2018-06-19 NOTE — MR AVS SNAPSHOT
After Visit Summary   6/19/2018    Efrain Lock    MRN: [id number]           Patient Information     Date Of Birth          1975        Visit Information        Provider Department      6/19/2018 9:40 AM Satinder Bernard MD [facility] [place]        Today's Diagnoses     Arthralgia of right lower leg    -  1    Primary osteoarthritis of right knee        Sprain of medial collateral ligament of right knee, initial encounter        Rotator cuff syndrome of left shoulder        Essential hypertension, benign           Follow-ups after your visit        Additional Services     [facility]       **This order will print in the [facility] Scheduling Office**    Physical Therapy, Hand Therapy and Chiropractic Care are available through:    *[facility]  *[facility]  *[facility]    Call one number to schedule at any of the above locations: [phone].    Your provider has referred you to: Physical Therapy at [facility] or [place]    Indication/Reason for Referral:    Arthralgia of right lower leg  Rotator cuff syndrome of left shoulder    Onset of Illness:   Therapy Orders: Evaluate and Treat  Special Programs: None  Special Request: None    Efren Schwartz      Additional Comments for the Therapist or Chiropractor:     Please be aware that coverage of these services is subject to the terms and limitations of your health insurance plan.  Call member services at your health plan with any benefit or coverage questions.      Please bring the following to your appointment:    *Your personal calendar for scheduling future appointments  *Comfortable clothing                  Who to contact     If you have questions or need follow up information about today's clinic visit or your schedule please contact [facility] [place] directly at [phone].  Normal or non-critical lab  "and imaging results will be communicated to you by MyChart, letter or phone within 4 business days after the clinic has received the results. If you do not hear from us within 7 days, please contact the clinic through [employer] or phone. If you have a critical or abnormal lab result, we will notify you by phone as soon as possible.  Submit refill requests through [employer] or call your pharmacy and they will forward the refill request to us. Please allow 3 business days for your refill to be completed.          Additional Information About Your Visit        [employer] Information     [employer] gives you secure access to your electronic health record. If you see a primary care provider, you can also send messages to your care team and make appointments. If you have questions, please call your primary care clinic.  If you do not have a primary care provider, please call [phone] and they will assist you.        Care EveryWhere ID     This is your Care EveryWhere ID. This could be used by other organizations to access your [place] medical records  [id number]        Your Vitals Were     Height BMI (Body Mass Index)                5' 5\" (1.651 m) 40.44 kg/m2           Blood Pressure from Last 3 Encounters:   06/19/18 (!) 142/94   08/17/17 120/76   04/11/17 132/84    Weight from Last 3 Encounters:   06/19/18 243 lb (110.2 kg)   08/17/17 243 lb (110.2 kg)   04/11/17 242 lb (109.8 kg)              We Performed the Following     [facility]          Today's Medication Changes          These changes are accurate as of 6/19/18 11:21 AM.  If you have any questions, ask your nurse or doctor.               Start taking these medicines.        Dose/Directions    order for DME   Used for:  Primary osteoarthritis of right knee, Sprain of medial collateral ligament of right knee, initial encounter   Started by:  Satinder Bernard MD        Equipment being ordered: knee, hinged brace, xl   Quantity:  1 " Device   Refills:  0            Where to get your medicines      Some of these will need a paper prescription and others can be bought over the counter.  Ask your nurse if you have questions.     Bring a paper prescription for each of these medications     order for DME                Primary Care Provider Office Phone # Fax #    [facility] [phone]        [facility] [address]        Equal Access to Services     EUSEBIO MCKEON : Hadii aad ku hadasho Soomaali, waaxda luqadaha, qaybta kaalmada adeegyada, waxay idiin hayaan adeeg khmacish lanidia . So [facility] [phone].    ATENCIÓN: Si habla español, tiene a mello disposición servicios gratuitos de asistencia lingüística. [place] al [phone].    We comply with applicable federal civil rights laws and [place] laws. We do not discriminate on the basis of race, color, national origin, age, disability, sex, sexual orientation, or gender identity.            Thank you!     Thank you for choosing [facility]  for your [facility]. Our goal is always to provide you with excellent care. Hearing back from our patients is one way we can continue to improve our services. Please take a few minutes to complete the written survey that you may receive in the mail after your visit with us. Thank you!             Your Updated Medication List - Protect others around you: Learn how to safely use, store and throw away your medicines at www.disposemymeds.org.          This list is accurate as of 6/19/18 11:21 AM.  Always use your most recent med list.                   Brand Name Dispense Instructions for use Diagnosis    amLODIPine 5 MG tablet    NORVASC    30 tablet    Take 1 tablet (5 mg) by mouth daily    Essential hypertension, benign       losartan 100 MG tablet    COZAAR    30 tablet    Take 1 tablet (100 mg) by mouth daily    Essential hypertension       metoprolol succinate 50 MG 24 hr tablet     TOPROL-XL    90 tablet    Take 1 tablet (50 mg) by mouth daily    Benign essential hypertension       Multi-vitamin Tabs tablet      Take 1 tablet by mouth daily        order for DME     1 Device    Equipment being ordered: knee, hinged brace, xl    Primary osteoarthritis of right knee, Sprain of medial collateral ligament of right knee, initial encounter       * varenicline 1 MG tablet    CHANTIX    56 tablet    Take 1 tablet (1 mg) by mouth 2 times daily    Encounter for smoking cessation counseling       * varenicline 0.5 MG X 11 & 1 MG X 42 tablet    CHANTIX STARTING MONTH DOROTHY    53 tablet    Take 0.5 mg tab daily for 3 days, then 0.5 mg tab twice daily for 4 days, then 1 mg twice daily.    Tobacco abuse       * Notice:  This list has 2 medication(s) that are the same as other medications prescribed for you. Read the directions carefully, and ask your doctor or other care provider to review them with you.

## 2018-06-25 ENCOUNTER — OFFICE VISIT (OUTPATIENT)
Dept: FAMILY MEDICINE | Facility: CLINIC | Age: 43
End: 2018-06-25
Payer: COMMERCIAL

## 2018-06-25 VITALS
BODY MASS INDEX: 39.89 KG/M2 | HEART RATE: 79 BPM | HEIGHT: 65 IN | DIASTOLIC BLOOD PRESSURE: 76 MMHG | OXYGEN SATURATION: 97 % | WEIGHT: 239.4 LBS | SYSTOLIC BLOOD PRESSURE: 138 MMHG

## 2018-06-25 DIAGNOSIS — I10 BENIGN ESSENTIAL HYPERTENSION: Primary | ICD-10-CM

## 2018-06-25 DIAGNOSIS — Z80.42 FAMILY HISTORY OF PROSTATE CANCER: ICD-10-CM

## 2018-06-25 DIAGNOSIS — Z12.5 SCREENING FOR PROSTATE CANCER: ICD-10-CM

## 2018-06-25 DIAGNOSIS — Z11.4 SCREENING FOR HIV (HUMAN IMMUNODEFICIENCY VIRUS): ICD-10-CM

## 2018-06-25 DIAGNOSIS — R80.9 MICROALBUMINURIA: ICD-10-CM

## 2018-06-25 DIAGNOSIS — E78.1 HYPERTRIGLYCERIDEMIA: ICD-10-CM

## 2018-06-25 DIAGNOSIS — E66.01 MORBID OBESITY (H): ICD-10-CM

## 2018-06-25 PROBLEM — R73.03 PREDIABETES: Status: RESOLVED | Noted: 2017-04-11 | Resolved: 2018-06-25

## 2018-06-25 LAB
ALBUMIN SERPL-MCNC: 3.9 G/DL (ref 3.4–5)
ALP SERPL-CCNC: 142 U/L (ref 40–150)
ALT SERPL W P-5'-P-CCNC: 39 U/L (ref 0–70)
ANION GAP SERPL CALCULATED.3IONS-SCNC: 12 MMOL/L (ref 3–14)
AST SERPL W P-5'-P-CCNC: 21 U/L (ref 0–45)
BILIRUB SERPL-MCNC: 0.5 MG/DL (ref 0.2–1.3)
BUN SERPL-MCNC: 15 MG/DL (ref 7–30)
CALCIUM SERPL-MCNC: 8.6 MG/DL (ref 8.5–10.1)
CHLORIDE SERPL-SCNC: 106 MMOL/L (ref 94–109)
CHOLEST SERPL-MCNC: 194 MG/DL
CO2 SERPL-SCNC: 22 MMOL/L (ref 20–32)
CREAT SERPL-MCNC: 0.92 MG/DL (ref 0.66–1.25)
CREAT UR-MCNC: 201 MG/DL
GFR SERPL CREATININE-BSD FRML MDRD: 89 ML/MIN/1.7M2
GLUCOSE SERPL-MCNC: 109 MG/DL (ref 70–99)
HDLC SERPL-MCNC: 34 MG/DL
LDLC SERPL CALC-MCNC: 101 MG/DL
MICROALBUMIN UR-MCNC: 788 MG/L
MICROALBUMIN/CREAT UR: 392.04 MG/G CR (ref 0–17)
NONHDLC SERPL-MCNC: 160 MG/DL
POTASSIUM SERPL-SCNC: 3.3 MMOL/L (ref 3.4–5.3)
PROT SERPL-MCNC: 7.8 G/DL (ref 6.8–8.8)
PSA SERPL-ACNC: 0.84 UG/L (ref 0–4)
SODIUM SERPL-SCNC: 140 MMOL/L (ref 133–144)
TRIGL SERPL-MCNC: 293 MG/DL
URATE SERPL-MCNC: 7.6 MG/DL (ref 3.5–7.2)

## 2018-06-25 PROCEDURE — 84550 ASSAY OF BLOOD/URIC ACID: CPT | Performed by: FAMILY MEDICINE

## 2018-06-25 PROCEDURE — 99214 OFFICE O/P EST MOD 30 MIN: CPT | Performed by: FAMILY MEDICINE

## 2018-06-25 PROCEDURE — 87389 HIV-1 AG W/HIV-1&-2 AB AG IA: CPT | Performed by: FAMILY MEDICINE

## 2018-06-25 PROCEDURE — 80053 COMPREHEN METABOLIC PANEL: CPT | Performed by: FAMILY MEDICINE

## 2018-06-25 PROCEDURE — 36415 COLL VENOUS BLD VENIPUNCTURE: CPT | Performed by: FAMILY MEDICINE

## 2018-06-25 PROCEDURE — G0103 PSA SCREENING: HCPCS | Performed by: FAMILY MEDICINE

## 2018-06-25 PROCEDURE — 80061 LIPID PANEL: CPT | Performed by: FAMILY MEDICINE

## 2018-06-25 PROCEDURE — 82043 UR ALBUMIN QUANTITATIVE: CPT | Performed by: FAMILY MEDICINE

## 2018-06-25 RX ORDER — LOSARTAN POTASSIUM 100 MG/1
100 TABLET ORAL DAILY
Qty: 90 TABLET | Refills: 3 | Status: SHIPPED | OUTPATIENT
Start: 2018-06-25 | End: 2019-08-08

## 2018-06-25 RX ORDER — AMLODIPINE BESYLATE 5 MG/1
5 TABLET ORAL DAILY
Qty: 90 TABLET | Refills: 3 | Status: SHIPPED | OUTPATIENT
Start: 2018-06-25 | End: 2019-07-07

## 2018-06-25 RX ORDER — METOPROLOL SUCCINATE 50 MG/1
50 TABLET, EXTENDED RELEASE ORAL DAILY
Qty: 90 TABLET | Refills: 3 | Status: SHIPPED | OUTPATIENT
Start: 2018-06-25 | End: 2019-08-08

## 2018-06-25 NOTE — PROGRESS NOTES
SUBJECTIVE:   Efrain Lock is a 43 year old male who presents to clinic today for the following health issues:      Hypertension Follow-up      Outpatient blood pressures are being checked at home.  Sometime     Low Salt Diet: no added salt      Amount of exercise or physical activity: 2-3 days/week for an average of greater than 60 minutes    Problems taking medications regularly: No.  Ran out of the medication a few days ago.    Medication side effects: none    Diet: regular (no restrictions)    History of hypertriglyceridemia.  Currently not on medications.    Also reports that he recently had some arthritis in his knee diagnosed at the orthopedic clinic.  Would like his uric acid levels checked.  No history of gout.  Patient has had microalbuminuria in the past, noted on the lab work from before.    Family history reviewed.  Patient has history of prostate cancer in the father    Problem list and histories reviewed & adjusted, as indicated.  Additional history: as documented    Patient Active Problem List   Diagnosis     Tobacco abuse     Essential hypertension, benign     Morbid obesity (H)     Family history of prostate cancer     Microalbuminuria     Hypertriglyceridemia     Past Surgical History:   Procedure Laterality Date     SURGICAL HISTORY OF -       rt knee surgery       Social History   Substance Use Topics     Smoking status: Current Every Day Smoker     Packs/day: 0.30     Types: Cigarettes     Last attempt to quit: 7/22/2013     Smokeless tobacco: Never Used      Comment: stopped x 4-6 wks and restarted      Alcohol use No     Family History   Problem Relation Age of Onset     Prostate Cancer Father 80     Hypertension Paternal Grandmother      Colon Cancer No family hx of      Diabetes No family hx of      Myocardial Infarction No family hx of          Current Outpatient Prescriptions   Medication Sig Dispense Refill     amLODIPine (NORVASC) 5 MG tablet Take 1 tablet (5 mg) by mouth daily 90  "tablet 3     losartan (COZAAR) 100 MG tablet Take 1 tablet (100 mg) by mouth daily 90 tablet 3     metoprolol succinate (TOPROL-XL) 50 MG 24 hr tablet Take 1 tablet (50 mg) by mouth daily 90 tablet 3     [DISCONTINUED] amLODIPine (NORVASC) 5 MG tablet Take 1 tablet (5 mg) by mouth daily 30 tablet 0     [DISCONTINUED] losartan (COZAAR) 100 MG tablet Take 1 tablet (100 mg) by mouth daily 30 tablet 0     [DISCONTINUED] metoprolol (TOPROL-XL) 50 MG 24 hr tablet Take 1 tablet (50 mg) by mouth daily 90 tablet 3     Allergies   Allergen Reactions     Lisinopril Cough     BP Readings from Last 3 Encounters:   06/25/18 138/76   06/19/18 (!) 142/94   08/17/17 120/76    Wt Readings from Last 3 Encounters:   06/25/18 239 lb 6.4 oz (108.6 kg)   06/19/18 243 lb (110.2 kg)   08/17/17 243 lb (110.2 kg)                  Labs reviewed in EPIC    Reviewed and updated as needed this visit by clinical staff  Tobacco  Allergies  Meds  Med Hx  Surg Hx  Fam Hx  Soc Hx      Reviewed and updated as needed this visit by Provider  Allergies         ROS:  CONSTITUTIONAL: NEGATIVE for fever, chills, change in weight  ENT/MOUTH: NEGATIVE for ear, mouth and throat problems  RESP: NEGATIVE for significant cough or SOB  CV: NEGATIVE for chest pain, palpitations or peripheral edema    OBJECTIVE:                                                    /76 (BP Location: Left arm, Patient Position: Chair, Cuff Size: Adult Large)  Pulse 79  Ht 5' 5\" (1.651 m)  Wt 239 lb 6.4 oz (108.6 kg)  SpO2 97%  BMI 39.84 kg/m2  Body mass index is 39.84 kg/(m^2).   GENERAL: healthy, alert, well nourished, well hydrated, no distress  HENT: ear canals- normal; TMs- normal; Nose- normal; Mouth- no ulcers, no lesions  NECK: no tenderness, no adenopathy, no asymmetry, no masses, no stiffness; thyroid- normal to palpation  RESP: lungs clear to auscultation - no rales, no rhonchi, no wheezes  CV: regular rates and rhythm, normal S1 S2, no S3 or S4 and no murmur, " no click or rub -  ABDOMEN: soft, no tenderness, no  hepatosplenomegaly, no masses, normal bowel sounds  MS: extremities- no gross deformities noted, no edema         ASSESSMENT/PLAN:                                                      1. Benign essential hypertension  Well-controlled.  Refill of medications ordered.  Fasting labs ordered.  - amLODIPine (NORVASC) 5 MG tablet; Take 1 tablet (5 mg) by mouth daily  Dispense: 90 tablet; Refill: 3  - losartan (COZAAR) 100 MG tablet; Take 1 tablet (100 mg) by mouth daily  Dispense: 90 tablet; Refill: 3  - metoprolol succinate (TOPROL-XL) 50 MG 24 hr tablet; Take 1 tablet (50 mg) by mouth daily  Dispense: 90 tablet; Refill: 3  - Uric acid  - Comprehensive metabolic panel    2. Hypertriglyceridemia  Fasting labs ordered.  Weight loss discussed.  - Lipid Profile    3. Morbid obesity (H)  Weight loss discussed by improving diet and exercising regularly.    4. Screening for prostate cancer  Recommending prostate cancer screening with family history of prostate cancer noted  - Prostate spec antigen screen    5. Family history of prostate cancer  As above.    6. Microalbuminuria  Microalbuminuria noted on the previous lab work.  Patient is on losartan already.  - Albumin Random Urine Quantitative with Creat Ratio    7. Screening for HIV (human immunodeficiency virus)    - HIV Antigen Antibody Combo          Henry Cesar MD  [facility]

## 2018-06-25 NOTE — MR AVS SNAPSHOT
After Visit Summary   6/25/2018    Efrain Lock    MRN: [id number]           Patient Information     Date Of Birth          1975        Visit Information        Provider Department      6/25/2018 10:00 AM [facility]        Today's Diagnoses     Benign essential hypertension    -  1    Hyperlipidemia LDL goal <130        Morbid obesity (H)        Screening for prostate cancer        Family history of prostate cancer        Microalbuminuria        Screening for HIV (human immunodeficiency virus)           Follow-ups after your visit        Follow-up notes from your care team     Return in about 1 year (around 6/25/2019) for Annual Physical Exam.      Your next 10 appointments already scheduled     Jun 25, 2018 10:00 AM CDT   Office Visit with Henry Cesar MD   [facility] [place] [facility]    [address] [place] [address] [phone] [phone]           Bring a current list of meds and any records pertaining to this visit. For Physicals, please bring immunization records and any forms needing to be filled out. Please arrive 10 minutes early to complete paperwork.              Who to contact     If you have questions or need follow up information about today's clinic visit or your schedule please contact [facility] directly at [phone].  Normal or non-critical lab and imaging results will be communicated to you by MyChart, letter or phone within 4 business days after the clinic has received the results. If you do not hear from us within 7 days, please contact the clinic through MyChart or phone. If you have a critical or abnormal lab result, we will notify you by phone as soon as possible.  Submit refill requests through [employer] or call your pharmacy and they will forward the refill request to us. Please allow 3 business days for your refill to be completed.          Additional Information  "About Your Visit        [employer] Information     [employer] gives you secure access to your electronic health record. If you see a primary care provider, you can also send messages to your care team and make appointments. If you have questions, please call your primary care clinic.  If you do not have a primary care provider, please call [phone] and they will assist you.        Care EveryWhere ID     This is your Care EveryWhere ID. This could be used by other organizations to access your [place] medical records  [id number]        Your Vitals Were     Pulse Height Pulse Oximetry BMI (Body Mass Index)          79 5' 5\" (1.651 m) 97% 39.84 kg/m2         Blood Pressure from Last 3 Encounters:   06/25/18 138/76   06/19/18 (!) 142/94   08/17/17 120/76    Weight from Last 3 Encounters:   06/25/18 239 lb 6.4 oz (108.6 kg)   06/19/18 243 lb (110.2 kg)   08/17/17 243 lb (110.2 kg)              We Performed the Following     Albumin Random Urine Quantitative with Creat Ratio     Comprehensive metabolic panel     HIV Antigen Antibody Combo     Lipid Profile     Prostate spec antigen screen     Uric acid          Today's Medication Changes          These changes are accurate as of 6/25/18  9:58 AM.  If you have any questions, ask your nurse or doctor.               Stop taking these medicines if you haven't already. Please contact your care team if you have questions.     Multi-vitamin Tabs tablet   Stopped by:  Henry Cesar MD                Where to get your medicines      [id number] [place] [facility] [address] [facility] [address]  [address] [facility] [address]     Phone:  [phone]     amLODIPine 5 MG tablet    losartan 100 MG tablet    metoprolol succinate 50 MG 24 hr tablet                Primary Care Provider Office Phone # Fax #    Henry Cesar [phone]       [address]  [place] [address]        Equal Access to " Services     [facility]: Hadii aad ku hadnaterobert Apoorvarudi, wachristianda luqadaha, qaybta kaalmarush marie, kaylynn moreland. So [facility] [phone].    ATENCIÓN: Si habla español, tiene a mello disposición servicios gratuitos de asistencia lingüística. Llame al [phone].    We comply with applicable federal civil rights laws and [place] laws. We do not discriminate on the basis of race, color, national origin, age, disability, sex, sexual orientation, or gender identity.            Thank you!     Thank you for choosing [facility] [place]  for your care. Our goal is always to provide you with excellent care. Hearing back from our patients is one way we can continue to improve our services. Please take a few minutes to complete the written survey that you may receive in the mail after your visit with us. Thank you!             Your Updated Medication List - Protect others around you: Learn how to safely use, store and throw away your medicines at www.disposemymeds.org.          This list is accurate as of 6/25/18  9:58 AM.  Always use your most recent med list.                   Brand Name Dispense Instructions for use Diagnosis    amLODIPine 5 MG tablet    NORVASC    90 tablet    Take 1 tablet (5 mg) by mouth daily        losartan 100 MG tablet    COZAAR    90 tablet    Take 1 tablet (100 mg) by mouth daily        metoprolol succinate 50 MG 24 hr tablet    TOPROL-XL    90 tablet    Take 1 tablet (50 mg) by mouth daily    Benign essential hypertension

## 2018-06-26 ENCOUNTER — THERAPY VISIT (OUTPATIENT)
Dept: PHYSICAL THERAPY | Facility: CLINIC | Age: 43
End: 2018-06-26
Payer: COMMERCIAL

## 2018-06-26 DIAGNOSIS — M25.512 LEFT SHOULDER PAIN: Primary | ICD-10-CM

## 2018-06-26 LAB — HIV 1+2 AB+HIV1 P24 AG SERPL QL IA: NONREACTIVE

## 2018-06-26 PROCEDURE — 97110 THERAPEUTIC EXERCISES: CPT | Mod: GP | Performed by: PHYSICAL THERAPIST

## 2018-06-26 PROCEDURE — 97161 PT EVAL LOW COMPLEX 20 MIN: CPT | Mod: GP | Performed by: PHYSICAL THERAPIST

## 2018-06-26 PROCEDURE — 97014 ELECTRIC STIMULATION THERAPY: CPT | Mod: GP | Performed by: PHYSICAL THERAPIST

## 2018-06-26 PROCEDURE — 97010 HOT OR COLD PACKS THERAPY: CPT | Mod: GP | Performed by: PHYSICAL THERAPIST

## 2018-06-26 NOTE — PROGRESS NOTES
[facility] Initial Evaluation  Subjective:  Patient is a 43 year old male presenting with rehab left shoulder hpi.   Efrain Lock is a 43 year old male with a left shoulder condition.  Condition occurred with:  Repetition/overuse.  Condition occurred: during recreation/sport.  This is a new condition  Patient is referred with a 3 week hx of L superior shoulder pain. He was lifting weights at the gym and noted soreness the next day on top of the shoulder. Pain is worse with bench press and reaching above shoulder level. Lying on L side increases pain..            Associated symptoms:  Loss of motion/stiffness. Pain is worse during the day.  Symptoms are exacerbated by using arm behind back, lying on extremity, using arm overhead and lifting and relieved by rest.  Since onset symptoms are unchanged.        General health as reported by patient is good.  Pertinent medical history includes:  Smoking, overweight and high blood pressure.      Current medications:  High blood pressure medication.    Patient is working in normal job without restrictions.  Primary job tasks include:  Lifting.    Barriers include:  None as reported by the patient.    Red flags:  None as reported by the patient.                        Objective:  System                   Shoulder Evaluation:  ROM:  AROM:  normal                                  Strength:  normal                      Stability Testing:  not assessed      Special Tests:    Left shoulder positive for the following special tests:  Acromioclavicular    Palpation:    Left shoulder tenderness present at:  Acrimioclavicular    Mobility Tests:    Glenohumeral anterior left:  Normal  Glenohumeral anterior right:  Normal  Glenohumeral posterior left:  Hypomobile  Glenohumeral posterior right:  Normal  Glenohumeral inferior left:  Normal  Glenohumeral inferior right:  Normal                                             General     ROS    Assessment/Plan:    Patient is  a 43 year old male with left side shoulder complaints.    Patient has the following significant findings with corresponding treatment plan.                Diagnosis 1:  L shoulder pain  Pain -  hot/cold therapy, electric stimulation, self management, education and home program  Decreased ROM/flexibility - therapeutic exercise and home program  Decreased function - therapeutic activities and home program    Therapy Evaluation Codes:   1) History comprised of:   Personal factors that impact the plan of care:      None.    Comorbidity factors that impact the plan of care are:      None.     Medications impacting care: None.  2) Examination of Body Systems comprised of:   Body structures and functions that impact the plan of care:      Shoulder.   Activity limitations that impact the plan of care are:      Lifting and Laying down.  3) Clinical presentation characteristics are:   Stable/Uncomplicated.  4) Decision-Making    Low complexity using standardized patient assessment instrument and/or measureable assessment of functional outcome.  Cumulative Therapy Evaluation is: Low complexity.    Previous and current functional limitations:  (See Goal Flow Sheet for this information)    Short term and Long term goals: (See Goal Flow Sheet for this information)     Communication ability:  Patient appears to be able to clearly communicate and understand verbal and written communication and follow directions correctly.  Treatment Explanation - The following has been discussed with the patient:   RX ordered/plan of care  Anticipated outcomes  Possible risks and side effects  This patient would benefit from PT intervention to resume normal activities.   Rehab potential is excellent.    Frequency:  2 X week, once daily  Duration:  for 3 weeks  Discharge Plan:  Achieve all LTG.  Independent in home treatment program.  Reach maximal therapeutic benefit.    Please refer to the daily flowsheet for treatment today, total treatment time  and time spent performing 1:1 timed codes.

## 2018-06-26 NOTE — MR AVS SNAPSHOT
After Visit Summary   6/26/2018    Efrain Lock    MRN: [id number]           Patient Information     Date Of Birth          1975        Visit Information        Provider Department      6/26/2018 9:00 AM Suhas Gonzalez PT [facility]        Today's Diagnoses     Left shoulder pain    -  1       Follow-ups after your visit        Your next 10 appointments already scheduled     Jun 28, 2018  9:00 AM CDT   DUSTIN Extremity with Suhas Gonzalez PT   [facility] ([facility] [place])    [address] [place] [address]  [address]  [place] [address] [phone]              Who to contact     If you have questions or need follow up information about today's clinic visit or your schedule please contact [facility] directly at [phone].  Normal or non-critical lab and imaging results will be communicated to you by [employer], letter or phone within 4 business days after the clinic has received the results. If you do not hear from us within 7 days, please contact the clinic through [employer] or phone. If you have a critical or abnormal lab result, we will notify you by phone as soon as possible.  Submit refill requests through [employer] or call your pharmacy and they will forward the refill request to us. Please allow 3 business days for your refill to be completed.          Additional Information About Your Visit        Resource[employer]th[employer] Information     [employer] gives you secure access to your electronic health record. If you see a primary care provider, you can also send messages to your care team and make appointments. If you have questions, please call your primary care clinic.  If you do not have a primary care provider, please call [phone] and they will assist you.        Care EveryWhere ID     This is your Care EveryWhere ID. This could be used by other  organizations to access your [facility] medical records  [id number]         Blood Pressure from Last 3 Encounters:   06/25/18 138/76   06/19/18 (!) 142/94   08/17/17 120/76    Weight from Last 3 Encounters:   06/25/18 108.6 kg (239 lb 6.4 oz)   06/19/18 110.2 kg (243 lb)   08/17/17 110.2 kg (243 lb)              We Performed the Following     ELECTRIC STIMULATION THERAPY     HC PT EVAL, LOW COMPLEXITY     HOT OR COLD PACKS THERAPY     DUSTIN INITIAL EVAL REPORT     THERAPEUTIC EXERCISES        Primary Care Provider Office Phone # Fax #    Henry Cesar [phone] [address]  [place] [address]        Equal Access to Services     EUSEBIO MCKEON : Hadii aad ku hadasho Soomaali, waaxda luqadaha, qaybta kaalmada adeegyada, kaylynn moreland. So [facility] [phone].    ATENCIÓN: Si habla español, tiene a mello disposición servicios gratuitos de asistencia lingüística. Llame al [phone].    We comply with applicable federal civil rights laws and [place] laws. We do not discriminate on the basis of race, color, national origin, age, disability, sex, sexual orientation, or gender identity.            Thank you!     Thank you for choosing [facility] [facility]  for your care. Our goal is always to provide you with excellent care. Hearing back from our patients is one way we can continue to improve our services. Please take a few minutes to complete the written survey that you may receive in the mail after your visit with us. Thank you!             Your Updated Medication List - Protect others around you: Learn how to safely use, store and throw away your medicines at www.disposemymeds.org.          This list is accurate as of 6/26/18 10:09 AM.  Always use your most recent med list.                   Brand Name Dispense Instructions for use Diagnosis    amLODIPine 5 MG tablet    NORVASC    90 tablet    Take 1 tablet (5 mg) by  mouth daily    Benign essential hypertension       losartan 100 MG tablet    COZAAR    90 tablet    Take 1 tablet (100 mg) by mouth daily    Benign essential hypertension       metoprolol succinate 50 MG 24 hr tablet    TOPROL-XL    90 tablet    Take 1 tablet (50 mg) by mouth daily    Benign essential hypertension

## 2018-06-28 ENCOUNTER — THERAPY VISIT (OUTPATIENT)
Dept: PHYSICAL THERAPY | Facility: CLINIC | Age: 43
End: 2018-06-28
Payer: COMMERCIAL

## 2018-06-28 DIAGNOSIS — M25.512 LEFT SHOULDER PAIN: ICD-10-CM

## 2018-06-28 PROCEDURE — 97010 HOT OR COLD PACKS THERAPY: CPT | Mod: GP | Performed by: PHYSICAL THERAPIST

## 2018-06-28 PROCEDURE — 97014 ELECTRIC STIMULATION THERAPY: CPT | Mod: GP | Performed by: PHYSICAL THERAPIST

## 2018-06-28 PROCEDURE — 97110 THERAPEUTIC EXERCISES: CPT | Mod: GP | Performed by: PHYSICAL THERAPIST

## 2018-06-28 PROCEDURE — 97035 APP MDLTY 1+ULTRASOUND EA 15: CPT | Mod: GP | Performed by: PHYSICAL THERAPIST

## 2018-07-05 ENCOUNTER — THERAPY VISIT (OUTPATIENT)
Dept: PHYSICAL THERAPY | Facility: CLINIC | Age: 43
End: 2018-07-05
Payer: COMMERCIAL

## 2018-07-05 DIAGNOSIS — M25.512 LEFT SHOULDER PAIN: ICD-10-CM

## 2018-07-05 PROCEDURE — 97110 THERAPEUTIC EXERCISES: CPT | Mod: GP | Performed by: PHYSICAL THERAPIST

## 2018-07-05 PROCEDURE — 97035 APP MDLTY 1+ULTRASOUND EA 15: CPT | Mod: GP | Performed by: PHYSICAL THERAPIST

## 2018-07-05 PROCEDURE — 97140 MANUAL THERAPY 1/> REGIONS: CPT | Mod: GP | Performed by: PHYSICAL THERAPIST

## 2018-07-05 PROCEDURE — 97014 ELECTRIC STIMULATION THERAPY: CPT | Mod: GP | Performed by: PHYSICAL THERAPIST

## 2018-07-12 ENCOUNTER — THERAPY VISIT (OUTPATIENT)
Dept: PHYSICAL THERAPY | Facility: CLINIC | Age: 43
End: 2018-07-12
Payer: COMMERCIAL

## 2018-07-12 DIAGNOSIS — M25.512 LEFT SHOULDER PAIN: ICD-10-CM

## 2018-07-12 PROCEDURE — 97140 MANUAL THERAPY 1/> REGIONS: CPT | Mod: GP | Performed by: PHYSICAL THERAPIST

## 2018-07-12 PROCEDURE — 97110 THERAPEUTIC EXERCISES: CPT | Mod: GP | Performed by: PHYSICAL THERAPIST

## 2018-07-12 PROCEDURE — 97014 ELECTRIC STIMULATION THERAPY: CPT | Mod: GP | Performed by: PHYSICAL THERAPIST

## 2018-07-12 PROCEDURE — 97035 APP MDLTY 1+ULTRASOUND EA 15: CPT | Mod: GP | Performed by: PHYSICAL THERAPIST

## 2019-03-29 ENCOUNTER — OFFICE VISIT (OUTPATIENT)
Dept: FAMILY MEDICINE | Facility: CLINIC | Age: 44
End: 2019-03-29
Payer: COMMERCIAL

## 2019-03-29 VITALS
HEART RATE: 79 BPM | HEIGHT: 65 IN | BODY MASS INDEX: 39.99 KG/M2 | TEMPERATURE: 97.9 F | OXYGEN SATURATION: 97 % | DIASTOLIC BLOOD PRESSURE: 83 MMHG | SYSTOLIC BLOOD PRESSURE: 132 MMHG | WEIGHT: 240 LBS

## 2019-03-29 DIAGNOSIS — I10 ESSENTIAL HYPERTENSION, BENIGN: ICD-10-CM

## 2019-03-29 DIAGNOSIS — H02.33: ICD-10-CM

## 2019-03-29 DIAGNOSIS — Z01.818 PREOP GENERAL PHYSICAL EXAM: Primary | ICD-10-CM

## 2019-03-29 PROCEDURE — 36415 COLL VENOUS BLD VENIPUNCTURE: CPT | Performed by: NURSE PRACTITIONER

## 2019-03-29 PROCEDURE — 99214 OFFICE O/P EST MOD 30 MIN: CPT | Performed by: NURSE PRACTITIONER

## 2019-03-29 PROCEDURE — 80048 BASIC METABOLIC PNL TOTAL CA: CPT | Performed by: NURSE PRACTITIONER

## 2019-03-29 ASSESSMENT — MIFFLIN-ST. JEOR: SCORE: 1893.63

## 2019-03-29 NOTE — PROGRESS NOTES
[facility]  [address] [address]  [phone]  Dept: [phone]    PRE-OP EVALUATION:  Today's date: 3/29/2019    *PREOP FAXED** Delfina Garcia CMA     Efrain Lock (: 1975) presents for pre-operative evaluation assessment as requested by Dr. MABEL HAWTHORNE  He requires evaluation and anesthesia risk assessment prior to undergoing surgery/procedure for treatment of loose skin surrounding right eye    Proposed Surgery/ Procedure: RIGHT EYE SURGERY   Date of Surgery/ Procedure: 2019  Time of Surgery/ Procedure: 9:45 AM   Hospital/Surgical Facility:  [facility]   Fax number for surgical facility: [phone]  Primary Physician: Henry Cesar  Type of Anesthesia Anticipated: General    Patient has a Health Care Directive or Living Will:  NO    1. NO - Do you have a history of heart attack, stroke, stent, bypass or surgery on an artery in the head, neck, heart or legs?  2. NO - Do you ever have any pain or discomfort in your chest?  3. NO - Do you have a history of  Heart Failure?  4. NO - Are you troubled by shortness of breath when: walking on the level, up a slight hill or at night?  5. NO - Do you currently have a cold, bronchitis or other respiratory infection?  6. NO - Do you have a cough, shortness of breath or wheezing?  7. NO - Do you sometimes get pains in the calves of your legs when you walk?  8. NO - Do you or anyone in your family have previous history of blood clots?  9. NO - Do you or does anyone in your family have a serious bleeding problem such as prolonged bleeding following surgeries or cuts?  10. NO - Have you ever had problems with anemia or been told to take iron pills?  11. NO - Have you had any abnormal blood loss such as black, tarry or bloody stools, or abnormal vaginal bleeding?  12. NO - Have you ever had a blood transfusion?  13. NO - Have you or any of your relatives ever had problems with anesthesia?  14. NO - Do  you have sleep apnea, excessive snoring or daytime drowsiness?  15. NO - Do you have any prosthetic heart valves?  16. NO - Do you have prosthetic joints?  17. NO - Is there any chance that you may be pregnant?      HPI:     HPI related to upcoming procedure: Longstanding issues with loose skin and large nevus near his right eye, which affects his vision. Has had one prior procedure to remedy this (2016). Issue has recurred, so he would like to have another procedure.      See problem list for active medical problems.  Problems all longstanding and stable, except as noted/documented.  See ROS for pertinent symptoms related to these conditions.                                                                                                                                                          .    MEDICAL HISTORY:     Patient Active Problem List    Diagnosis Date Noted     Left shoulder pain 06/26/2018     Priority: Medium     Morbid obesity (H) 06/25/2018     Priority: Medium     Family history of prostate cancer 06/25/2018     Priority: Medium     Microalbuminuria 06/25/2018     Priority: Medium     Hypertriglyceridemia 06/25/2018     Priority: Medium     Tobacco abuse 07/12/2012     Priority: Medium     Essential hypertension, benign 07/12/2012     Priority: Medium      Past Medical History:   Diagnosis Date     HTN (hypertension)      Microalbuminuria 10/30/2013     Tobacco abuse 7/12/2012     Past Surgical History:   Procedure Laterality Date     SURGICAL HISTORY OF -       rt knee surgery     Current Outpatient Medications   Medication Sig Dispense Refill     amLODIPine (NORVASC) 5 MG tablet Take 1 tablet (5 mg) by mouth daily 90 tablet 3     losartan (COZAAR) 100 MG tablet Take 1 tablet (100 mg) by mouth daily 90 tablet 3     metoprolol succinate (TOPROL-XL) 50 MG 24 hr tablet Take 1 tablet (50 mg) by mouth daily 90 tablet 3     Multiple Vitamins-Minerals (MULTIVITAMIN ADULT PO)        OTC products: None,  "except as noted above    Allergies   Allergen Reactions     Lisinopril Cough      Latex Allergy: NO    Social History     Tobacco Use     Smoking status: Current Every Day Smoker     Packs/day: 0.30     Types: Cigarettes     Last attempt to quit: 2013     Years since quittin.6     Smokeless tobacco: Never Used     Tobacco comment: stopped x 4-6 wks and restarted    Substance Use Topics     Alcohol use: No     Alcohol/week: 0.0 oz     History   Drug Use No       REVIEW OF SYSTEMS:   CONSTITUTIONAL: NEGATIVE for fever, chills, change in weight  INTEGUMENTARY/SKIN: NEGATIVE for worrisome rashes, moles or lesions  EYES: NEGATIVE for vision changes or irritation  ENT/MOUTH: NEGATIVE for ear, mouth and throat problems  RESP: NEGATIVE for significant cough or SOB  BREAST: NEGATIVE for masses, tenderness or discharge  CV: NEGATIVE for chest pain, palpitations or peripheral edema  GI: NEGATIVE for nausea, abdominal pain, heartburn, or change in bowel habits  : NEGATIVE for frequency, dysuria, or hematuria  MUSCULOSKELETAL: NEGATIVE for significant arthralgias or myalgia  NEURO: NEGATIVE for weakness, dizziness or paresthesias  ENDOCRINE: NEGATIVE for temperature intolerance, skin/hair changes  HEME: NEGATIVE for bleeding problems  PSYCHIATRIC: NEGATIVE for changes in mood or affect    EXAM:   /83   Pulse 79   Temp 97.9  F (36.6  C) (Tympanic)   Ht 1.64 m (5' 4.57\")   Wt 108.9 kg (240 lb)   SpO2 97%   BMI 40.48 kg/m      GENERAL APPEARANCE: healthy, alert and no distress     EYES: EOMI,  PERRL     HENT: ear canals and TM's normal and nose and mouth without ulcers or lesions     NECK: no adenopathy, no asymmetry, masses, or scars and thyroid normal to palpation     RESP: lungs clear to auscultation - no rales, rhonchi or wheezes     CV: regular rates and rhythm, normal S1 S2, no S3 or S4 and no murmur, click or rub     ABDOMEN:  soft, nontender, no HSM or masses and bowel sounds normal     MS: " extremities normal- no gross deformities noted, no evidence of inflammation in joints, FROM in all extremities.     SKIN: no suspicious lesions or rashes. See HPI.     NEURO: Normal strength and tone, sensory exam grossly normal, mentation intact and speech normal     PSYCH: mentation appears normal. and affect normal/bright     LYMPHATICS: No cervical adenopathy    DIAGNOSTICS:   No EKG required for low risk surgery (cataract, skin procedure, breast biopsy, etc).    Last Comprehensive Metabolic Panel:  Sodium   Date Value Ref Range Status   03/29/2019 141 133 - 144 mmol/L Final     Potassium   Date Value Ref Range Status   03/29/2019 3.2 (L) 3.4 - 5.3 mmol/L Final     Chloride   Date Value Ref Range Status   03/29/2019 110 (H) 94 - 109 mmol/L Final     Carbon Dioxide   Date Value Ref Range Status   03/29/2019 23 20 - 32 mmol/L Final     Anion Gap   Date Value Ref Range Status   03/29/2019 9 3 - 14 mmol/L Final     Glucose   Date Value Ref Range Status   03/29/2019 103 (H) 70 - 99 mg/dL Final     Comment:     Non Fasting     Urea Nitrogen   Date Value Ref Range Status   03/29/2019 14 7 - 30 mg/dL Final     Creatinine   Date Value Ref Range Status   03/29/2019 0.96 0.66 - 1.25 mg/dL Final     GFR Estimate   Date Value Ref Range Status   03/29/2019 >90 >60 mL/min/[1.73_m2] Final     Comment:     Non  GFR Calc  Starting 12/18/2018, serum creatinine based estimated GFR (eGFR) will be   calculated using the Chronic Kidney Disease Epidemiology Collaboration   (CKD-EPI) equation.       Calcium   Date Value Ref Range Status   03/29/2019 8.1 (L) 8.5 - 10.1 mg/dL Final     ** Once daily 10 mEq Potassium chloride tablet ordered based on K 3.3.      IMPRESSION:   Reason for surgery/procedure: Elective removal of loose skin and nevus from right eye region.    Diagnosis/reason for consult: Preoperative clearance    The proposed surgical procedure is considered LOW risk.    REVISED CARDIAC RISK INDEX  The patient  has the following serious cardiovascular risks for perioperative complications such as (MI, PE, VFib and 3  AV Block):  No serious cardiac risks  INTERPRETATION: 0 risks: Class I (very low risk - 0.4% complication rate)    The patient has the following additional risks for perioperative complications:  No identified additional risks      ICD-10-CM    1. Preop general physical exam Z01.818 Basic metabolic panel  (Ca, Cl, CO2, Creat, Gluc, K, Na, BUN)   2. Excess skin of right eyelid H02.33    3. Essential hypertension, benign I10 Basic metabolic panel  (Ca, Cl, CO2, Creat, Gluc, K, Na, BUN)       RECOMMENDATIONS:     --Patient is to take all scheduled medications on the day of surgery EXCEPT for modifications listed below.    APPROVAL GIVEN to proceed with proposed procedure, without further diagnostic evaluation       Signed Electronically by: Anuj Garcia NP    Copy of this evaluation report is provided to requesting physician.    [facility] Preop Guidelines    Revised Cardiac Risk Index

## 2019-03-30 DIAGNOSIS — E87.6 HYPOKALEMIA: Primary | ICD-10-CM

## 2019-03-30 LAB
ANION GAP SERPL CALCULATED.3IONS-SCNC: 9 MMOL/L (ref 3–14)
BUN SERPL-MCNC: 14 MG/DL (ref 7–30)
CALCIUM SERPL-MCNC: 8.1 MG/DL (ref 8.5–10.1)
CHLORIDE SERPL-SCNC: 110 MMOL/L (ref 94–109)
CO2 SERPL-SCNC: 23 MMOL/L (ref 20–32)
CREAT SERPL-MCNC: 0.96 MG/DL (ref 0.66–1.25)
GFR SERPL CREATININE-BSD FRML MDRD: >90 ML/MIN/{1.73_M2}
GLUCOSE SERPL-MCNC: 103 MG/DL (ref 70–99)
POTASSIUM SERPL-SCNC: 3.2 MMOL/L (ref 3.4–5.3)
SODIUM SERPL-SCNC: 141 MMOL/L (ref 133–144)

## 2019-03-30 RX ORDER — POTASSIUM CHLORIDE 750 MG/1
10 TABLET, EXTENDED RELEASE ORAL DAILY
Qty: 90 TABLET | Refills: 1 | Status: SHIPPED | OUTPATIENT
Start: 2019-03-30 | End: 2019-08-12

## 2019-04-04 ENCOUNTER — TELEPHONE (OUTPATIENT)
Dept: FAMILY MEDICINE | Facility: CLINIC | Age: 44
End: 2019-04-04

## 2019-04-04 NOTE — TELEPHONE ENCOUNTER
Patient given result message from Anuj Garcia NP. Patient agrees to start potassium and recheck potassium level in a month or so. Patient was grateful for the well wishes for his surgery. No questions as this time. Meagan Stanton RN

## 2019-04-04 NOTE — TELEPHONE ENCOUNTER
"I tried sending a result message and a My Best Interestt message to Efrain last week, but he has yet to read either. Please call him and relate the following:      \"I have reviewed your metabolic panel from you pre-op visit. Your potassium is once again low. This was low at last check, as well, and it has actually dropped even more since then. I have ordered 10 mEq potassium chloride tablets for you (Rx sent to [facility] in [place]). Please take one of these each day, and we can check your level in a month or so. Please reach out with questions or concerns. Good luck with your surgery. \"      \"Dear Efrain,   Please  the potassium chloride tablets that I ordered for you. After about one month, please schedule a lab only visit to come in and get another potassium level drawn. This lab is already ordered. I will follow up with you after I get the result. Please reach out with questions or other issues. Thank you.   MIKEL Yang, CNP on 3/30/2019\"       Thank you.  "

## 2019-04-04 NOTE — TELEPHONE ENCOUNTER
Non detailed message left for pt at [phone] to return call to clinic and ask to speak with a triage nurse.    Sammie SHAH RN  EP Triage

## 2019-04-30 ENCOUNTER — TRANSFERRED RECORDS (OUTPATIENT)
Dept: HEALTH INFORMATION MANAGEMENT | Facility: CLINIC | Age: 44
End: 2019-04-30
Payer: COMMERCIAL

## 2019-07-07 DIAGNOSIS — I10 BENIGN ESSENTIAL HYPERTENSION: ICD-10-CM

## 2019-07-08 RX ORDER — AMLODIPINE BESYLATE 5 MG/1
TABLET ORAL
Qty: 90 TABLET | Refills: 0 | Status: SHIPPED | OUTPATIENT
Start: 2019-07-08 | End: 2019-08-08

## 2019-07-08 NOTE — TELEPHONE ENCOUNTER
"Requested Prescriptions   Pending Prescriptions Disp Refills     amLODIPine (NORVASC) 5 MG tablet [Pharmacy Med Name: AMLODIPINE BESYLATE 5MG TABLETS] 90 tablet 0     Sig: TAKE 1 TABLET(5 MG) BY MOUTH DAILY  Last Written Prescription Date:  6/25/18  Last Fill Quantity: 90,  # refills: 3   Last office visit: 3/29/2019 with prescribing provider:  Jose   Future Office Visit:           Calcium Channel Blockers Protocol  Passed - 7/7/2019 10:46 AM        Passed - Blood pressure under 140/90 in past 12 months     BP Readings from Last 3 Encounters:   03/29/19 132/83   06/25/18 138/76   06/19/18 (!) 142/94                 Passed - Recent (12 mo) or future (30 days) visit within the authorizing provider's specialty     Patient had office visit in the last 12 months or has a visit in the next 30 days with authorizing provider or within the authorizing provider's specialty.  See \"Patient Info\" tab in inbasket, or \"Choose Columns\" in Meds & Orders section of the refill encounter.              Passed - Medication is active on med list        Passed - Patient is age 18 or older        Passed - Normal serum creatinine on file in past 12 months     Recent Labs   Lab Test 03/29/19  1128   CR 0.96               "

## 2019-08-08 ENCOUNTER — OFFICE VISIT (OUTPATIENT)
Dept: FAMILY MEDICINE | Facility: CLINIC | Age: 44
End: 2019-08-08
Payer: COMMERCIAL

## 2019-08-08 VITALS
HEART RATE: 69 BPM | SYSTOLIC BLOOD PRESSURE: 150 MMHG | BODY MASS INDEX: 41.15 KG/M2 | WEIGHT: 244 LBS | DIASTOLIC BLOOD PRESSURE: 98 MMHG | TEMPERATURE: 97.2 F | OXYGEN SATURATION: 98 %

## 2019-08-08 DIAGNOSIS — I10 BENIGN ESSENTIAL HYPERTENSION: Primary | ICD-10-CM

## 2019-08-08 DIAGNOSIS — E78.1 HYPERTRIGLYCERIDEMIA: ICD-10-CM

## 2019-08-08 LAB
ANION GAP SERPL CALCULATED.3IONS-SCNC: 9 MMOL/L (ref 3–14)
BUN SERPL-MCNC: 10 MG/DL (ref 7–30)
CALCIUM SERPL-MCNC: 8.3 MG/DL (ref 8.5–10.1)
CHLORIDE SERPL-SCNC: 109 MMOL/L (ref 94–109)
CHOLEST SERPL-MCNC: 191 MG/DL
CO2 SERPL-SCNC: 26 MMOL/L (ref 20–32)
CREAT SERPL-MCNC: 1.05 MG/DL (ref 0.66–1.25)
CREAT UR-MCNC: 159 MG/DL
GFR SERPL CREATININE-BSD FRML MDRD: 85 ML/MIN/{1.73_M2}
GLUCOSE SERPL-MCNC: 94 MG/DL (ref 70–99)
HDLC SERPL-MCNC: 32 MG/DL
LDLC SERPL CALC-MCNC: 103 MG/DL
MICROALBUMIN UR-MCNC: 1300 MG/L
MICROALBUMIN/CREAT UR: 817.61 MG/G CR (ref 0–17)
NONHDLC SERPL-MCNC: 159 MG/DL
POTASSIUM SERPL-SCNC: 3.8 MMOL/L (ref 3.4–5.3)
SODIUM SERPL-SCNC: 144 MMOL/L (ref 133–144)
TRIGL SERPL-MCNC: 279 MG/DL

## 2019-08-08 PROCEDURE — 80061 LIPID PANEL: CPT | Performed by: NURSE PRACTITIONER

## 2019-08-08 PROCEDURE — 82043 UR ALBUMIN QUANTITATIVE: CPT | Performed by: NURSE PRACTITIONER

## 2019-08-08 PROCEDURE — 99214 OFFICE O/P EST MOD 30 MIN: CPT | Performed by: NURSE PRACTITIONER

## 2019-08-08 PROCEDURE — 80048 BASIC METABOLIC PNL TOTAL CA: CPT | Performed by: NURSE PRACTITIONER

## 2019-08-08 PROCEDURE — 36415 COLL VENOUS BLD VENIPUNCTURE: CPT | Performed by: NURSE PRACTITIONER

## 2019-08-08 RX ORDER — METOPROLOL SUCCINATE 50 MG/1
50 TABLET, EXTENDED RELEASE ORAL DAILY
Qty: 90 TABLET | Refills: 3 | Status: SHIPPED | OUTPATIENT
Start: 2019-08-08 | End: 2020-09-10

## 2019-08-08 RX ORDER — LOSARTAN POTASSIUM 100 MG/1
100 TABLET ORAL DAILY
Qty: 90 TABLET | Refills: 3 | Status: SHIPPED | OUTPATIENT
Start: 2019-08-08 | End: 2020-09-11

## 2019-08-08 RX ORDER — AMLODIPINE BESYLATE 10 MG/1
10 TABLET ORAL DAILY
Qty: 90 TABLET | Refills: 3 | Status: SHIPPED | OUTPATIENT
Start: 2019-08-08 | End: 2020-09-10

## 2019-08-08 NOTE — PROGRESS NOTES
Subjective     Efrain Lock is a 44 year old male who presents to clinic today for the following health issues:    HPI   Medication Followup of Amlodipine, Cozaar, and Toprol-XL     Taking Medication as prescribed: yes    Side Effects:  None    Medication Helping Symptoms:  yes     HPI: Efrain is here for HTN follow up. He is currently using three agents to control his BP. However, he has been out of amlodipine for 2 weeks or so. He was also found to have hypokalemia in the past, so he was prescribed KCl 10 mEq, but he hasn't used this in a couple weeks, either. He periodically checks BPs outside of clinic, but it has been awhile since he has done so regularly. He states he plans to be more diligent about this in the future.     He also has hyperlipidemia (primarily triglycerides). He is working on weight loss and dietary changes that will hopefully reverse this. Would like this rechecked today.     He is also working on quitting smoking (this is day 3 of abstinence for him).       Patient Active Problem List   Diagnosis     Tobacco abuse     Essential hypertension, benign     Morbid obesity (H)     Family history of prostate cancer     Microalbuminuria     Hypertriglyceridemia     Left shoulder pain     Past Surgical History:   Procedure Laterality Date     SURGICAL HISTORY OF -       rt knee surgery       Social History     Tobacco Use     Smoking status: Former Smoker     Packs/day: 0.30     Types: Cigarettes     Last attempt to quit: 2013     Years since quittin.0     Smokeless tobacco: Never Used   Substance Use Topics     Alcohol use: No     Alcohol/week: 0.0 oz     Family History   Problem Relation Age of Onset     Prostate Cancer Father 80     Hypertension Paternal Grandmother      Colon Cancer No family hx of      Diabetes No family hx of      Myocardial Infarction No family hx of            Reviewed and updated as needed this visit by Provider  Tobacco  Allergies  Meds  Problems  Med Hx  Surg  Hx  Fam Hx         Review of Systems   ROS COMP: Constitutional, HEENT, cardiovascular, pulmonary, neuro systems are negative, except as otherwise noted.      Objective    BP (!) 150/98 (BP Location: Right arm, Patient Position: Chair, Cuff Size: Adult Large)   Pulse 69   Temp 97.2  F (36.2  C) (Tympanic)   Wt 110.7 kg (244 lb)   SpO2 98%   BMI 41.15 kg/m    Body mass index is 41.15 kg/m .  Physical Exam   GENERAL: healthy, alert and no distress  RESP: lungs clear to auscultation - no rales, rhonchi or wheezes  CV: regular rate and rhythm, normal S1 S2, no S3 or S4, no murmur, click or rub, no peripheral edema and peripheral pulses strong  NEURO: Normal strength and tone, mentation intact and speech normal    Diagnostic Test Results:  No results found for this or any previous visit (from the past 24 hour(s)).        Assessment & Plan     Efrain was seen today for recheck medication and labs only.    Diagnoses and all orders for this visit:    Benign essential hypertension  Comment: His BP is likely not in good control based on today's reading. Will increase amlodipine dose to 10 mg daily and have him return for a nurse only BP check in a couple weeks. He is due for HTN labs today, as well. Will follow up with him after I see the results. He agrees to begin taking K supplement once again if potassium is found to be low. Historically, his urine albumin study has been quite abnormal (as high as 600+), so will be watching this closely, as well.   -     amLODIPine (NORVASC) 10 MG tablet; Take 1 tablet (10 mg) by mouth daily  -     losartan (COZAAR) 100 MG tablet; Take 1 tablet (100 mg) by mouth daily  -     metoprolol succinate ER (TOPROL-XL) 50 MG 24 hr tablet; Take 1 tablet (50 mg) by mouth daily  -     Albumin Random Urine Quantitative with Creat Ratio  -     Basic metabolic panel    Hypertriglyceridemia  Comment: Congratulated him on his efforts to lose weight and make positive dietary changes. Will check  "lipids and take appropriate action (ie order medication), if needed.   -     Lipid panel reflex to direct LDL Fasting         BMI:   Estimated body mass index is 41.15 kg/m  as calculated from the following:    Height as of 3/29/19: 1.64 m (5' 4.57\").    Weight as of this encounter: 110.7 kg (244 lb).   Weight management plan: Discussed healthy diet and exercise guidelines        See Patient Instructions    Return in about 1 year (around 8/8/2020) for Physical Exam.    Anuj Garcia NP  [facility] – [place]      "

## 2019-08-12 ENCOUNTER — OFFICE VISIT (OUTPATIENT)
Dept: FAMILY MEDICINE | Facility: CLINIC | Age: 44
End: 2019-08-12
Payer: COMMERCIAL

## 2019-08-12 ENCOUNTER — ALLIED HEALTH/NURSE VISIT (OUTPATIENT)
Dept: NURSING | Facility: CLINIC | Age: 44
End: 2019-08-12
Payer: COMMERCIAL

## 2019-08-12 VITALS
SYSTOLIC BLOOD PRESSURE: 152 MMHG | BODY MASS INDEX: 40.64 KG/M2 | TEMPERATURE: 97 F | DIASTOLIC BLOOD PRESSURE: 96 MMHG | WEIGHT: 241 LBS | HEART RATE: 69 BPM | OXYGEN SATURATION: 97 %

## 2019-08-12 VITALS — SYSTOLIC BLOOD PRESSURE: 148 MMHG | HEART RATE: 69 BPM | DIASTOLIC BLOOD PRESSURE: 84 MMHG | OXYGEN SATURATION: 100 %

## 2019-08-12 DIAGNOSIS — I10 ESSENTIAL HYPERTENSION, BENIGN: Primary | ICD-10-CM

## 2019-08-12 PROCEDURE — 99213 OFFICE O/P EST LOW 20 MIN: CPT | Performed by: FAMILY MEDICINE

## 2019-08-12 PROCEDURE — 99207 ZZC NO CHARGE NURSE ONLY: CPT

## 2019-08-12 NOTE — PROGRESS NOTES
"Patient came into clinic today for nurse only visit for BP check.   BP: 1st 158/100 HR 69         2nd 150/98         3rd (by RN) 148/84.    LOV was 8/8/19 with NP Jose related to HTN. Patient denies chest pain, SOB, lightheaded.dizzy, drowsy/confustion, nausea/vomiting, blurred vision, coughing blood, fatigue/weakness.    Patient presents only with mild headache that has been constant for last 3 days. Patient on 8/8/19 had dose change with Norvasc from 5 mg to 10 mg. Patient on Amlodipine, losartan, metoprolol. Patient stated he took his BP medication this morning at 9 am and is \"feeling fine.\" Patient stated he was out of Amlodipine for about 2 weeks, so was not taking it until this past week. Per LOV note patient is working on quitting smoking. Huddled with PCP Arsenio. PCP would like to see patient for an office visit today. Patient agreed to see PCP today. Patient has been scheduled to see PCP.      Leanna Harman RN, BSN  [facility] [place]      "

## 2019-08-12 NOTE — PROGRESS NOTES
Efrain Lock is a 44 year old patient who comes in today for a Blood Pressure check.  Initial BP:  BP (!) 158/100 (Cuff Size: Adult Large)   Pulse 69   SpO2 100%      Data Unavailable  Disposition: BP elevated.  Triage RN notified, patient asked to wait

## 2019-08-12 NOTE — PROGRESS NOTES
Subjective     Efrain Lock is a 44 year old male who presents to clinic today for the following health issues:    HPI   Hypertension Follow-up      Do you check your blood pressure regularly outside of the clinic? No     Are you following a low salt diet? Yes    Are your blood pressures ever more than 140 on the top number (systolic) OR more   than 90 on the bottom number (diastolic), for example 140/90? Yes      How many servings of fruits and vegetables do you eat daily?  0-1    On average, how many sweetened beverages do you drink each day (soda, juice, sweet tea, etc)?   none  How many days per week do you miss taking your medication? Missed for like 2-3 weeks    What makes it hard for you to take your medications?            Patient Active Problem List   Diagnosis     Tobacco abuse     Essential hypertension, benign     Morbid obesity (H)     Family history of prostate cancer     Microalbuminuria     Hypertriglyceridemia     Left shoulder pain     Past Surgical History:   Procedure Laterality Date     SURGICAL HISTORY OF -       rt knee surgery       Social History     Tobacco Use     Smoking status: Former Smoker     Packs/day: 0.30     Types: Cigarettes     Last attempt to quit: 2013     Years since quittin.0     Smokeless tobacco: Never Used   Substance Use Topics     Alcohol use: No     Alcohol/week: 0.0 oz     Family History   Problem Relation Age of Onset     Prostate Cancer Father 80     Hypertension Paternal Grandmother      Colon Cancer No family hx of      Diabetes No family hx of      Myocardial Infarction No family hx of          Current Outpatient Medications   Medication Sig Dispense Refill     amLODIPine (NORVASC) 10 MG tablet Take 1 tablet (10 mg) by mouth daily 90 tablet 3     losartan (COZAAR) 100 MG tablet Take 1 tablet (100 mg) by mouth daily 90 tablet 3     metoprolol succinate ER (TOPROL-XL) 50 MG 24 hr tablet Take 1 tablet (50 mg) by mouth daily 90 tablet 3     Multiple  "Vitamins-Minerals (MULTIVITAMIN ADULT PO)        Allergies   Allergen Reactions     Lisinopril Cough         Reviewed and updated as needed this visit by Provider         Review of Systems   ROS COMP: CONSTITUTIONAL: NEGATIVE for fever, chills, change in weight  ENT/MOUTH: NEGATIVE for ear, mouth and throat problems  RESP: NEGATIVE for significant cough or SOB  CV: NEGATIVE for chest pain, palpitations or peripheral edema      Objective    BP (!) 152/96   Pulse 69   Temp 97  F (36.1  C) (Tympanic)   Wt 109.3 kg (241 lb)   SpO2 97%   BMI 40.64 kg/m    Body mass index is 40.64 kg/m .  Physical Exam   GENERAL: healthy, alert and no distress  NECK: no adenopathy, no asymmetry, masses, or scars and thyroid normal to palpation  RESP: lungs clear to auscultation - no rales, rhonchi or wheezes  CV: regular rate and rhythm, normal S1 S2, no S3 or S4, no murmur, click or rub, no peripheral edema and peripheral pulses strong  ABDOMEN: soft, nontender, no hepatosplenomegaly, no masses and bowel sounds normal  MS: no gross musculoskeletal defects noted, no edema            Assessment & Plan     1. Essential hypertension, benign  Hypertension is not well controlled.  Patient increase the dose of amlodipine a few days ago.  I would like him to continue the medications as before without any further changes and give it more time for the medications to take full effect.  Recommending low-salt diet.  Recommending exercise and low calorie diet to lose weight.  Follow-up in 1  week again for blood pressure recheck with a nurse.  Monitor blood pressures at home himself.  Patient is currently asymptomatic with high blood pressure.       BMI:   Estimated body mass index is 40.64 kg/m  as calculated from the following:    Height as of 3/29/19: 1.64 m (5' 4.57\").    Weight as of this encounter: 109.3 kg (241 lb).           Return in about 1 week (around 8/19/2019) for Blood Pressure check with NURSE..    Henry Cesar MD  [place] " [facility]

## 2019-08-19 ENCOUNTER — ALLIED HEALTH/NURSE VISIT (OUTPATIENT)
Dept: NURSING | Facility: CLINIC | Age: 44
End: 2019-08-19
Payer: COMMERCIAL

## 2019-08-19 VITALS — SYSTOLIC BLOOD PRESSURE: 136 MMHG | DIASTOLIC BLOOD PRESSURE: 88 MMHG

## 2019-08-19 DIAGNOSIS — I10 ESSENTIAL HYPERTENSION, BENIGN: Primary | ICD-10-CM

## 2019-08-19 PROCEDURE — 99207 ZZC NO CHARGE NURSE ONLY: CPT

## 2019-08-19 NOTE — PROGRESS NOTES
Efrain Lock is a 44 year old patient who comes in today for a Blood Pressure check.  Initial BP:  /88 (BP Location: Right arm, Patient Position: Chair, Cuff Size: Adult Large)      Data Unavailable  Disposition: results routed to provider    Patient had medication around noon yesterday.      Magali Gusman MA

## 2020-02-28 ENCOUNTER — OFFICE VISIT (OUTPATIENT)
Dept: FAMILY MEDICINE | Facility: CLINIC | Age: 45
End: 2020-02-28
Payer: COMMERCIAL

## 2020-02-28 VITALS
WEIGHT: 246 LBS | HEART RATE: 83 BPM | HEIGHT: 65 IN | BODY MASS INDEX: 40.98 KG/M2 | DIASTOLIC BLOOD PRESSURE: 86 MMHG | OXYGEN SATURATION: 99 % | TEMPERATURE: 97.4 F | SYSTOLIC BLOOD PRESSURE: 132 MMHG

## 2020-02-28 DIAGNOSIS — N52.9 ERECTILE DYSFUNCTION, UNSPECIFIED ERECTILE DYSFUNCTION TYPE: ICD-10-CM

## 2020-02-28 DIAGNOSIS — E79.0 HYPERURICEMIA: ICD-10-CM

## 2020-02-28 DIAGNOSIS — L30.9 ECZEMA, UNSPECIFIED TYPE: ICD-10-CM

## 2020-02-28 DIAGNOSIS — R80.9 MICROALBUMINURIA: ICD-10-CM

## 2020-02-28 DIAGNOSIS — I10 BENIGN ESSENTIAL HYPERTENSION: ICD-10-CM

## 2020-02-28 DIAGNOSIS — H57.89 EYE IRRITATION: ICD-10-CM

## 2020-02-28 DIAGNOSIS — Z12.5 SCREENING FOR PROSTATE CANCER: ICD-10-CM

## 2020-02-28 DIAGNOSIS — Z00.00 ANNUAL PHYSICAL EXAM: Primary | ICD-10-CM

## 2020-02-28 LAB
ALBUMIN SERPL-MCNC: 3.8 G/DL (ref 3.4–5)
ALP SERPL-CCNC: 149 U/L (ref 40–150)
ALT SERPL W P-5'-P-CCNC: 51 U/L (ref 0–70)
ANION GAP SERPL CALCULATED.3IONS-SCNC: 9 MMOL/L (ref 3–14)
AST SERPL W P-5'-P-CCNC: 25 U/L (ref 0–45)
BILIRUB SERPL-MCNC: 0.4 MG/DL (ref 0.2–1.3)
BUN SERPL-MCNC: 11 MG/DL (ref 7–30)
CALCIUM SERPL-MCNC: 8.6 MG/DL (ref 8.5–10.1)
CHLORIDE SERPL-SCNC: 109 MMOL/L (ref 94–109)
CHOLEST SERPL-MCNC: 205 MG/DL
CO2 SERPL-SCNC: 21 MMOL/L (ref 20–32)
CREAT SERPL-MCNC: 1.08 MG/DL (ref 0.66–1.25)
CREAT UR-MCNC: 211 MG/DL
GFR SERPL CREATININE-BSD FRML MDRD: 81 ML/MIN/{1.73_M2}
GLUCOSE SERPL-MCNC: 114 MG/DL (ref 70–99)
HDLC SERPL-MCNC: 34 MG/DL
HGB BLD-MCNC: 15.3 G/DL (ref 13.3–17.7)
LDLC SERPL CALC-MCNC: 126 MG/DL
MICROALBUMIN UR-MCNC: 842 MG/L
MICROALBUMIN/CREAT UR: 399.05 MG/G CR (ref 0–17)
NONHDLC SERPL-MCNC: 171 MG/DL
POTASSIUM SERPL-SCNC: 3.2 MMOL/L (ref 3.4–5.3)
PROT SERPL-MCNC: 8.1 G/DL (ref 6.8–8.8)
PSA SERPL-ACNC: 1.17 UG/L (ref 0–4)
SODIUM SERPL-SCNC: 139 MMOL/L (ref 133–144)
TRIGL SERPL-MCNC: 223 MG/DL
TSH SERPL DL<=0.005 MIU/L-ACNC: 1.86 MU/L (ref 0.4–4)
URATE SERPL-MCNC: 6 MG/DL (ref 3.5–7.2)

## 2020-02-28 PROCEDURE — 80061 LIPID PANEL: CPT | Performed by: FAMILY MEDICINE

## 2020-02-28 PROCEDURE — 82043 UR ALBUMIN QUANTITATIVE: CPT | Performed by: FAMILY MEDICINE

## 2020-02-28 PROCEDURE — 36415 COLL VENOUS BLD VENIPUNCTURE: CPT | Performed by: FAMILY MEDICINE

## 2020-02-28 PROCEDURE — 90714 TD VACC NO PRESV 7 YRS+ IM: CPT | Performed by: FAMILY MEDICINE

## 2020-02-28 PROCEDURE — 99214 OFFICE O/P EST MOD 30 MIN: CPT | Mod: 25 | Performed by: FAMILY MEDICINE

## 2020-02-28 PROCEDURE — 90471 IMMUNIZATION ADMIN: CPT | Performed by: FAMILY MEDICINE

## 2020-02-28 PROCEDURE — 99396 PREV VISIT EST AGE 40-64: CPT | Mod: 25 | Performed by: FAMILY MEDICINE

## 2020-02-28 PROCEDURE — 80053 COMPREHEN METABOLIC PANEL: CPT | Performed by: FAMILY MEDICINE

## 2020-02-28 PROCEDURE — 85018 HEMOGLOBIN: CPT | Performed by: FAMILY MEDICINE

## 2020-02-28 PROCEDURE — G0103 PSA SCREENING: HCPCS | Performed by: FAMILY MEDICINE

## 2020-02-28 PROCEDURE — 84443 ASSAY THYROID STIM HORMONE: CPT | Performed by: FAMILY MEDICINE

## 2020-02-28 PROCEDURE — 84270 ASSAY OF SEX HORMONE GLOBUL: CPT | Performed by: FAMILY MEDICINE

## 2020-02-28 PROCEDURE — 84403 ASSAY OF TOTAL TESTOSTERONE: CPT | Performed by: FAMILY MEDICINE

## 2020-02-28 PROCEDURE — 84550 ASSAY OF BLOOD/URIC ACID: CPT | Performed by: FAMILY MEDICINE

## 2020-02-28 RX ORDER — TRIAMCINOLONE ACETONIDE 1 MG/G
OINTMENT TOPICAL 2 TIMES DAILY
Qty: 30 G | Refills: 0 | Status: SHIPPED | OUTPATIENT
Start: 2020-02-28 | End: 2020-04-08

## 2020-02-28 ASSESSMENT — MIFFLIN-ST. JEOR: SCORE: 1915.85

## 2020-02-28 NOTE — PROGRESS NOTES
SUBJECTIVE:   CC: Efrain Lock is an 44 year old male who presents for preventative health visit.     Healthy Habits:     Getting at least 3 servings of Calcium per day:  NO    Bi-annual eye exam:  Yes    Dental care twice a year:  Yes    Sleep apnea or symptoms of sleep apnea:  Excessive snoring    Diet:  Regular (no restrictions)    Frequency of exercise:  2-3 days/week    Duration of exercise:  45-60 minutes    Taking medications regularly:  Yes    Medication side effects:  Not applicable    PHQ-2 Total Score: 0    Additional concerns today:  Yes (Discuss weight loss options )      Patient would like a referral to an eye doctor.  He has had oculoplastic surgery done on his right eye but ever since then he notices more irritation in the eye.  He thinks the lids keep some fluid in which causes irritation.    Also reports of itching on his left shin.  He has tried to lubricated but that does not help.  he tried an antifungal as well over-the-counter which did not help.      History of microalbuminuria.  He is on an ACE inhibitor.  History of hyperuricemia.  No history of gout.    Patient has family history of prostate cancer and would like prostate cancer screening.    Complains of erectile dysfunction off-and-on that he is noticed in the last several months.  Wonders if he could try Viagra.  No previous investigations done.    Hypertension Follow-up      Do you check your blood pressure regularly outside of the clinic? Yes     Are you following a low salt diet? Yes    Are your blood pressures ever more than 140 on the top number (systolic) OR more   than 90 on the bottom number (diastolic), for example 140/90? No      Today's PHQ-2 Score:   PHQ-2 ( 1999 [employer]) 2/26/2020   Q1: Little interest or pleasure in doing things 0   Q2: Feeling down, depressed or hopeless 0   PHQ-2 Score 0   Q1: Little interest or pleasure in doing things Not at all   Q2: Feeling down, depressed or hopeless Not at all   PHQ-2 Score 0        Abuse: Current or Past(Physical, Sexual or Emotional)- No  Do you feel safe in your environment? Yes        Social History     Tobacco Use     Smoking status: Current Every Day Smoker     Packs/day: 0.30     Types: Cigarettes     Smokeless tobacco: Never Used   Substance Use Topics     Alcohol use: No     Alcohol/week: 0.0 standard drinks     If you drink alcohol do you typically have >3 drinks per day or >7 drinks per week? Not applicable    No flowsheet data found.    Last PSA:   PSA   Date Value Ref Range Status   06/25/2018 0.84 0 - 4 ug/L Final     Comment:     Assay Method:  Chemiluminescence using Siemens Vista analyzer       Reviewed orders with patient. Reviewed health maintenance and updated orders accordingly - Yes  BP Readings from Last 3 Encounters:   02/28/20 132/86   08/19/19 136/88   08/12/19 (!) 152/96    Wt Readings from Last 3 Encounters:   02/28/20 111.6 kg (246 lb)   08/12/19 109.3 kg (241 lb)   08/08/19 110.7 kg (244 lb)                  Patient Active Problem List   Diagnosis     Tobacco abuse     Essential hypertension, benign     Morbid obesity (H)     Family history of prostate cancer     Microalbuminuria     Hypertriglyceridemia     Left shoulder pain     Past Surgical History:   Procedure Laterality Date     SURGICAL HISTORY OF -       rt knee surgery       Social History     Tobacco Use     Smoking status: Current Every Day Smoker     Packs/day: 0.30     Types: Cigarettes     Smokeless tobacco: Never Used   Substance Use Topics     Alcohol use: No     Alcohol/week: 0.0 standard drinks     Family History   Problem Relation Age of Onset     Prostate Cancer Father 80     Hypertension Paternal Grandmother      Colon Cancer No family hx of      Diabetes No family hx of      Myocardial Infarction No family hx of          Current Outpatient Medications   Medication Sig Dispense Refill     amLODIPine (NORVASC) 10 MG tablet Take 1 tablet (10 mg) by mouth daily 90 tablet 3     losartan  "(COZAAR) 100 MG tablet Take 1 tablet (100 mg) by mouth daily 90 tablet 3     metoprolol succinate ER (TOPROL-XL) 50 MG 24 hr tablet Take 1 tablet (50 mg) by mouth daily 90 tablet 3     Multiple Vitamins-Minerals (MULTIVITAMIN ADULT PO)        triamcinolone (KENALOG) 0.1 % external ointment Apply topically 2 times daily 30 g 0     Allergies   Allergen Reactions     Lisinopril Cough       Reviewed and updated as needed this visit by clinical staff  Tobacco  Allergies  Meds  Soc Hx        Reviewed and updated as needed this visit by Provider  Tobacco  Allergies  Soc Hx           Review of Systems  CONSTITUTIONAL: NEGATIVE for fever, chills, change in weight  INTEGUMENTARY/SKIN: NEGATIVE for worrisome rashes, moles or lesions  EYES: NEGATIVE for vision changes or irritation  ENT: NEGATIVE for ear, mouth and throat problems  RESP: NEGATIVE for significant cough or SOB  CV: NEGATIVE for chest pain, palpitations or peripheral edema  GI: NEGATIVE for nausea, abdominal pain, heartburn, or change in bowel habits   male: negative for dysuria, hematuria, decreased urinary stream, erectile dysfunction, urethral discharge  MUSCULOSKELETAL: NEGATIVE for significant arthralgias or myalgia  NEURO: NEGATIVE for weakness, dizziness or paresthesias  PSYCHIATRIC: NEGATIVE for changes in mood or affect    OBJECTIVE:   /86   Pulse 83   Temp 97.4  F (36.3  C) (Tympanic)   Ht 1.64 m (5' 4.57\")   Wt 111.6 kg (246 lb)   SpO2 99%   BMI 41.49 kg/m      Physical Exam  GENERAL: healthy, alert and no distress  EYES: Eyes grossly normal to inspection, PERRL and conjunctivae and sclerae normal  HENT: ear canals and TM's normal, nose and mouth without ulcers or lesions  NECK: no adenopathy, no asymmetry, masses, or scars and thyroid normal to palpation  RESP: lungs clear to auscultation - no rales, rhonchi or wheezes  CV: regular rate and rhythm, normal S1 S2, no S3 or S4, no murmur, click or rub, no peripheral edema and peripheral " "pulses strong  ABDOMEN: soft, nontender, no hepatosplenomegaly, no masses and bowel sounds normal  MS: no gross musculoskeletal defects noted, no edema  SKIN: Dry erythematous skin noted on the left shin.  NEURO: Normal strength and tone, mentation intact and speech normal  PSYCH: mentation appears normal, affect normal/bright        ASSESSMENT/PLAN:   1. Annual physical exam    - TD (ADULT, 7+) PRESERVE FREE  - TSH with free T4 reflex  - Hemoglobin    2. Eye irritation  Referring to ophthalmology.  - OPHTHALMOLOGY ADULT REFERRAL    3. Eczema, unspecified type  Left shin with eczema.  Triamcinolone recommended.  - triamcinolone (KENALOG) 0.1 % external ointment; Apply topically 2 times daily  Dispense: 30 g; Refill: 0    4. Benign essential hypertension  Well-controlled.  Patient should have refills on all his medications for now.  - Comprehensive metabolic panel  - Lipid panel reflex to direct LDL Fasting    5. Screening for prostate cancer    - Prostate spec antigen screen    6. Microalbuminuria  Resume ACE inhibitor.  Recheck labs ordered  - Albumin Random Urine Quantitative with Creat Ratio    7. Erectile dysfunction, unspecified erectile dysfunction type  Recommending to get testosterone levels checked.  If any abnormality noted, I would recommend to see urology.  If no abnormality noted we could try giving him some Viagra  - Testosterone Free and Total    8. Hyperuricemia  No history of gout.  Uric acid levels repeated today  - Uric acid    COUNSELING:   Reviewed preventive health counseling, as reflected in patient instructions       Regular exercise       Healthy diet/nutrition    Estimated body mass index is 41.49 kg/m  as calculated from the following:    Height as of this encounter: 1.64 m (5' 4.57\").    Weight as of this encounter: 111.6 kg (246 lb).     Weight management plan: Discussed healthy diet and exercise guidelines     reports that he has been smoking cigarettes. He has been smoking about 0.30 " packs per day. He has never used smokeless tobacco.  Tobacco Cessation Action Plan: Information offered: Patient not interested at this time    Counseling Resources:  ATP IV Guidelines  Pooled Cohorts Equation Calculator  FRAX Risk Assessment  ICSI Preventive Guidelines  Dietary Guidelines for Americans, 2010  USDA's MyPlate  ASA Prophylaxis  Lung CA Screening    Henry Cesar MD  [facility] [place]

## 2020-03-03 LAB
SHBG SERPL-SCNC: 27 NMOL/L (ref 11–80)
TESTOST FREE SERPL-MCNC: 8.51 NG/DL (ref 4.7–24.4)
TESTOST SERPL-MCNC: 377 NG/DL (ref 240–950)

## 2020-03-03 RX ORDER — SILDENAFIL 25 MG/1
25 TABLET, FILM COATED ORAL DAILY PRN
Qty: 12 TABLET | Refills: 3 | Status: SHIPPED | OUTPATIENT
Start: 2020-03-03 | End: 2020-10-19

## 2020-06-07 ENCOUNTER — TRANSFERRED RECORDS (OUTPATIENT)
Dept: HEALTH INFORMATION MANAGEMENT | Facility: CLINIC | Age: 45
End: 2020-06-07

## 2020-07-21 ENCOUNTER — OFFICE VISIT (OUTPATIENT)
Dept: FAMILY MEDICINE | Facility: CLINIC | Age: 45
End: 2020-07-21
Payer: COMMERCIAL

## 2020-07-21 VITALS
SYSTOLIC BLOOD PRESSURE: 134 MMHG | TEMPERATURE: 96.9 F | DIASTOLIC BLOOD PRESSURE: 88 MMHG | BODY MASS INDEX: 40.14 KG/M2 | HEART RATE: 75 BPM | WEIGHT: 238 LBS | OXYGEN SATURATION: 100 %

## 2020-07-21 DIAGNOSIS — M23.306 DEGENERATION OF MENISCUS OF RIGHT KNEE: ICD-10-CM

## 2020-07-21 DIAGNOSIS — G89.29 CHRONIC PAIN OF RIGHT KNEE: Primary | ICD-10-CM

## 2020-07-21 DIAGNOSIS — M25.561 CHRONIC PAIN OF RIGHT KNEE: Primary | ICD-10-CM

## 2020-07-21 PROCEDURE — 99214 OFFICE O/P EST MOD 30 MIN: CPT | Performed by: FAMILY MEDICINE

## 2020-07-21 NOTE — PROGRESS NOTES
Subjective     Efrain Lock is a 45 year old male who presents to clinic today for the following health issues:    HPI       Joint Pain    Onset: 2 years worse the past 3-4 months, - states had MRI done about two years ago at [facility]    Description:   Location: right knee  Character: Dull ache    Intensity: moderate    Progression of Symptoms: worse    Accompanying Signs & Symptoms:  Other symptoms: none    History:   Previous similar pain: YES      Precipitating factors:   Trauma or overuse: no     Alleviating factors:  Improved by: nothing    Therapies Tried and outcome: brace not much help          Patient Active Problem List   Diagnosis     Tobacco abuse     Essential hypertension, benign     Morbid obesity (H)     Family history of prostate cancer     Microalbuminuria     Hypertriglyceridemia     Left shoulder pain     Past Surgical History:   Procedure Laterality Date     SURGICAL HISTORY OF -       rt knee surgery       Social History     Tobacco Use     Smoking status: Current Every Day Smoker     Packs/day: 0.30     Types: Cigarettes     Smokeless tobacco: Never Used   Substance Use Topics     Alcohol use: No     Alcohol/week: 0.0 standard drinks     Family History   Problem Relation Age of Onset     Prostate Cancer Father 80     Hypertension Paternal Grandmother      Colon Cancer No family hx of      Diabetes No family hx of      Myocardial Infarction No family hx of          Current Outpatient Medications   Medication Sig Dispense Refill     amLODIPine (NORVASC) 10 MG tablet Take 1 tablet (10 mg) by mouth daily 90 tablet 3     losartan (COZAAR) 100 MG tablet Take 1 tablet (100 mg) by mouth daily 90 tablet 3     metoprolol succinate ER (TOPROL-XL) 50 MG 24 hr tablet Take 1 tablet (50 mg) by mouth daily 90 tablet 3     Multiple Vitamins-Minerals (MULTIVITAMIN ADULT PO)        sildenafil (VIAGRA) 25 MG tablet Take 1 tablet (25 mg) by mouth daily as needed (Erectile dysfunctions) 12 tablet 3     triamcinolone  (KENALOG) 0.1 % external ointment APPLY EXTERNALLY TO THE AFFECTED AREA TWICE DAILY 30 g 0     Allergies   Allergen Reactions     Lisinopril Cough       Reviewed and updated as needed this visit by Provider  Tobacco         Review of Systems   CONSTITUTIONAL: NEGATIVE for fever, chills, change in weight  ENT/MOUTH: NEGATIVE for ear, mouth and throat problems  RESP: NEGATIVE for significant cough or SOB  CV: NEGATIVE for chest pain, palpitations or peripheral edema      Objective    /88   Pulse 75   Temp 96.9  F (36.1  C) (Tympanic)   Wt 108 kg (238 lb)   SpO2 100%   BMI 40.14 kg/m    Body mass index is 40.14 kg/m .  Physical Exam   GENERAL: healthy, alert and no distress  RESP: lungs clear to auscultation - no rales, rhonchi or wheezes  CV: regular rate and rhythm, normal S1 S2, no S3 or S4, no murmur, click or rub, no peripheral edema and peripheral pulses strong  MS: Right knee: Mild swelling noted without any erythema.  No localized tenderness.  Left knee without any swelling.  No crepitation.  Range of motion is normal.  No instability noted on exam today            Assessment & Plan     1. Chronic pain of right knee  Patient has ongoing chronic right knee pain.  Recently symptoms got worsened.  No acute trauma reported therefore x-rays not performed.  MRI of the right knee ordered as I suspect a meniscal injury.  Patient agrees with the plan.  He is instructed to check insurance coverage before proceeding with an MRI. Management would be based on the test results.  Patient verbalized agreement to the plan  - MR Knee Right w/o Contrast; Future         Henry Cesar MD  [facility] – [place], [place]

## 2020-08-01 ENCOUNTER — TRANSFERRED RECORDS (OUTPATIENT)
Dept: HEALTH INFORMATION MANAGEMENT | Facility: CLINIC | Age: 45
End: 2020-08-01

## 2020-08-05 ENCOUNTER — HOSPITAL ENCOUNTER (OUTPATIENT)
Dept: MRI IMAGING | Facility: CLINIC | Age: 45
Discharge: HOME | End: 2020-08-05
Attending: FAMILY MEDICINE | Admitting: FAMILY MEDICINE
Payer: COMMERCIAL

## 2020-08-05 DIAGNOSIS — M25.561 CHRONIC PAIN OF RIGHT KNEE: ICD-10-CM

## 2020-08-05 DIAGNOSIS — G89.29 CHRONIC PAIN OF RIGHT KNEE: ICD-10-CM

## 2020-08-05 PROCEDURE — 73721 MRI JNT OF LWR EXTRE W/O DYE: CPT | Mod: RT

## 2020-08-06 NOTE — RESULT ENCOUNTER NOTE
Please call the patient to notify patient that the MRI of the right knee shows marked degeneration of the cartilage.  Patient needs to be seen by orthopedics immediately.  I will put in an urgent consult.  Notify the patient    Henry Cesar MD

## 2020-09-09 DIAGNOSIS — I10 BENIGN ESSENTIAL HYPERTENSION: ICD-10-CM

## 2020-09-10 ENCOUNTER — MYC REFILL (OUTPATIENT)
Dept: FAMILY MEDICINE | Facility: CLINIC | Age: 45
End: 2020-09-10

## 2020-09-10 DIAGNOSIS — I10 BENIGN ESSENTIAL HYPERTENSION: ICD-10-CM

## 2020-09-10 RX ORDER — METOPROLOL SUCCINATE 50 MG/1
50 TABLET, EXTENDED RELEASE ORAL DAILY
Qty: 90 TABLET | Refills: 3 | OUTPATIENT
Start: 2020-09-10

## 2020-09-10 RX ORDER — AMLODIPINE BESYLATE 10 MG/1
10 TABLET ORAL DAILY
Qty: 90 TABLET | Refills: 3 | OUTPATIENT
Start: 2020-09-10

## 2020-09-10 RX ORDER — AMLODIPINE BESYLATE 10 MG/1
10 TABLET ORAL DAILY
Qty: 90 TABLET | Refills: 3 | Status: SHIPPED | OUTPATIENT
Start: 2020-09-10 | End: 2021-02-26

## 2020-09-10 RX ORDER — METOPROLOL SUCCINATE 50 MG/1
50 TABLET, EXTENDED RELEASE ORAL DAILY
Qty: 90 TABLET | Refills: 3 | Status: SHIPPED | OUTPATIENT
Start: 2020-09-10 | End: 2021-02-26

## 2020-09-10 NOTE — TELEPHONE ENCOUNTER
Routing refill request to provider for review/approval because:  Labs out of range:  VIN AquinoN, RN  [employer] Triage

## 2020-09-11 RX ORDER — LOSARTAN POTASSIUM 100 MG/1
100 TABLET ORAL DAILY
Qty: 90 TABLET | Refills: 0 | Status: SHIPPED | OUTPATIENT
Start: 2020-09-11 | End: 2020-12-15

## 2020-10-19 ENCOUNTER — OFFICE VISIT (OUTPATIENT)
Dept: OPHTHALMOLOGY | Facility: CLINIC | Age: 45
End: 2020-10-19
Attending: FAMILY MEDICINE
Payer: COMMERCIAL

## 2020-10-19 DIAGNOSIS — H16.211 EXPOSURE KERATOPATHY, RIGHT: Primary | ICD-10-CM

## 2020-10-19 DIAGNOSIS — H02.132 SENILE ECTROPION OF RIGHT LOWER EYELID: ICD-10-CM

## 2020-10-19 DIAGNOSIS — H16.401 CORNEAL NEOVASCULARIZATION OF RIGHT EYE: ICD-10-CM

## 2020-10-19 DIAGNOSIS — H04.123 DRY EYE SYNDROME, BILATERAL: ICD-10-CM

## 2020-10-19 DIAGNOSIS — H02.401 PTOSIS OF RIGHT EYELID: ICD-10-CM

## 2020-10-19 DIAGNOSIS — H02.221 MECHANICAL LAGOPHTHALMOS OF RIGHT UPPER EYELID: ICD-10-CM

## 2020-10-19 PROCEDURE — 99203 OFFICE O/P NEW LOW 30 MIN: CPT | Mod: GC | Performed by: OPHTHALMOLOGY

## 2020-10-19 PROCEDURE — G0463 HOSPITAL OUTPT CLINIC VISIT: HCPCS

## 2020-10-19 RX ORDER — CARBOXYMETHYLCELLULOSE SODIUM 10 MG/ML
1 GEL OPHTHALMIC
Qty: 1 BOTTLE | Refills: 11 | Status: SHIPPED | OUTPATIENT
Start: 2020-10-19 | End: 2020-12-23

## 2020-10-19 RX ORDER — MINERAL OIL, PETROLATUM 425; 573 MG/G; MG/G
1 OINTMENT OPHTHALMIC AT BEDTIME
Qty: 1 TUBE | Refills: 11 | Status: SHIPPED | OUTPATIENT
Start: 2020-10-19 | End: 2020-12-23

## 2020-10-19 ASSESSMENT — SLIT LAMP EXAM - LIDS: COMMENTS: NORMAL

## 2020-10-19 ASSESSMENT — CUP TO DISC RATIO
OS_RATIO: 0.45
OD_RATIO: 0.5

## 2020-10-19 ASSESSMENT — VISUAL ACUITY
METHOD_MR: DIAGNOSTIC ONLY
OD_CC: 20/80
OS_CC: 20/20
OD_PH_CC: 20/30
OS_CC+: -2
METHOD: SNELLEN - LINEAR
OD_CC+: -1

## 2020-10-19 ASSESSMENT — TONOMETRY
IOP_METHOD: TONOPEN
OD_IOP_MMHG: 13
OS_IOP_MMHG: 19

## 2020-10-19 ASSESSMENT — CONF VISUAL FIELD
METHOD: COUNTING FINGERS
OS_NORMAL: 1
OD_NORMAL: 1

## 2020-10-19 ASSESSMENT — REFRACTION_MANIFEST
OD_AXIS: 045
OD_SPHERE: -1.50
OD_CYLINDER: +0.50

## 2020-10-19 NOTE — PROGRESS NOTES
HPI:  Efrain Lock is a 45 year old male with a history of right lower eyelid/upper cheek mass present since around 2014. He had this excised (benign), and then it recurred, and he had a surgery for this as well as right upper eyelid blepharoplasty and right lower eyelid canthopexy in April of 2019 by plastic surgery.     He states that since surgery, the right eye has been red. He used a steroid eye drop for 3 weeks, but this did not help much. It has been persistently red and itching since then. There is associated foreign body sensation, and he rubs the eye a lot. The vision is foggy. His wife states that his eye looks open while he sleeps.    POH:  - No Eye issues in the past. No Glass usage. No CTL.   - No current gtt's.  - Mass on right lower eye lid/upper cheek s/p removal - (Lid tightening upper and lower lid - Dec, 2018), Right upper lid blepharoplasty, right lower lid canthopexy,  Excision of recurrent right lower lid cyst (benign) - 4/2019.    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   Right eye exposure keratopathy - medial lagophthalmos with S-shape to lid margin.   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 Related to exposure keratopathy. On review of plastic surgery note from 2016, he noted blurred vision in the right eye at that time.   Plan: Monitor, check corneal sensation next visit    -----------------------------------------------------------------------------------    Patient disposition:   Return in about 4 weeks (around 11/16/2020) for ocular surface check, or  sooner as needed.    Cl Bonilla MD  Department of Ophthalmology  Pager: [phone]    Teaching statement:  Complete documentation of historical and exam elements from today's encounter can be found in the full encounter summary report (not reduplicated in this progress note). I personally obtained the chief complaint(s) and history of present illness.  I confirmed and edited as necessary the review of systems, past medical/surgical history, family history, social history, and examination findings as documented by others; and I examined the patient myself. I personally reviewed the relevant tests, images, and reports as documented above.     I formulated and edited as necessary the assessment and plan and discussed the findings and management plan with the patient and family.      Roberta Yost MD  Comprehensive Ophthalmology & Ocular Pathology  Department of Ophthalmology and Visual Neurosciences  [email or web address]  Pager [phone]

## 2020-10-19 NOTE — NURSING NOTE
Chief Complaints and History of Present Illnesses   Patient presents with     Consult     Chief Complaint(s) and History of Present Illness(es)     Consult               Comments     Efrain is here, referred by Henry Cesar for RE eye irritation that has been present over one year since RE lid surgery ( upper and temporally). He feels a FB sensation sometimes RE and always itches. Denies pain, flashes or floaters.    Suhas Sim COT 7:36 AM October 19, 2020

## 2020-10-20 NOTE — TELEPHONE ENCOUNTER
FUTURE VISIT INFORMATION      FUTURE VISIT INFORMATION:    Date: 10/27/20    Time: 8:00am    Location: [facility]  REFERRAL INFORMATION:    Referring provider:  Priyank    Referring providers clinic:  [facility]    RECORDS REQUESTED FROM:       Clinic name Comments Records Status Imaging Status   [facility] Ov/referral 10/19/20 Epic

## 2020-10-27 ENCOUNTER — PRE VISIT (OUTPATIENT)
Dept: OPHTHALMOLOGY | Facility: CLINIC | Age: 45
End: 2020-10-27

## 2020-10-27 ENCOUNTER — OFFICE VISIT (OUTPATIENT)
Dept: OPHTHALMOLOGY | Facility: CLINIC | Age: 45
End: 2020-10-27
Payer: COMMERCIAL

## 2020-10-27 ENCOUNTER — TELEPHONE (OUTPATIENT)
Dept: OPHTHALMOLOGY | Facility: CLINIC | Age: 45
End: 2020-10-27

## 2020-10-27 DIAGNOSIS — H02.59 FLOPPY EYELID SYNDROME OF RIGHT EYE: Primary | ICD-10-CM

## 2020-10-27 DIAGNOSIS — H02.023 MECHANICAL ENTROPION OF RIGHT EYE: ICD-10-CM

## 2020-10-27 DIAGNOSIS — H02.132 SENILE ECTROPION OF RIGHT LOWER EYELID: ICD-10-CM

## 2020-10-27 DIAGNOSIS — H17.9 CORNEAL SCAR: ICD-10-CM

## 2020-10-27 PROCEDURE — 92285 EXTERNAL OCULAR PHOTOGRAPHY: CPT | Performed by: OPHTHALMOLOGY

## 2020-10-27 PROCEDURE — 99213 OFFICE O/P EST LOW 20 MIN: CPT | Performed by: OPHTHALMOLOGY

## 2020-10-27 ASSESSMENT — CONF VISUAL FIELD
METHOD: COUNTING FINGERS
OD_NORMAL: 1
OS_NORMAL: 1

## 2020-10-27 ASSESSMENT — SLIT LAMP EXAM - LIDS: COMMENTS: NORMAL

## 2020-10-27 ASSESSMENT — VISUAL ACUITY
METHOD: SNELLEN - LINEAR
OS_SC: 20/25
OS_SC+: +1
OD_SC: 20/40

## 2020-10-27 ASSESSMENT — TONOMETRY
OS_IOP_MMHG: 12
OD_IOP_MMHG: 09
IOP_METHOD: ICARE

## 2020-10-27 NOTE — LETTER
10/27/2020       RE: Efrain Lock  [address] [address]     Dear Colleague,    Thank you for referring your patient, Efrain Lock, to the [facility] [place] [facility]. Please see a copy of my visit note below.         Chief Complaint(s) and History of Present Illness(es)     Consult For     Laterality: right eye    Associated symptoms: redness, dryness (better in the past week) and   itching (mild, currently).  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               Comments     He states that his right eyelids do not close.   This has been   problematic since his last right lower lid lesion excision procedure,   which was about 2 years ago.    Caridad Somers, COT 7:56 AM  October 27, 2020      History of excision of congenital nevus excision with Dr. Rajan once in clinic and once in the operating room, along with canthopexy, and since then the eye has been red and irritated, and blurry. The right upper eyelid often everts when he sleeps. He is a stomach sleeper.     Assessment & Plan     Efrain Lock is a 45 year old male with the following diagnoses:   Encounter Diagnoses   Name Primary?     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    Plan:  Right lower eyelid ectropion repair, right upper eyelid entropion repair with pentagonal wedge.    Continue lubrication as recommended by Dr. Yost.   Attending Physician Attestation: Complete documentation of historical and exam elements from today's encounter can be found in the full encounter summary report (not reduplicated in this progress  note). I personally obtained the chief complaint(s) and history of present illness. I confirmed and edited as necessary the review of systems, past medical/surgical history, family history, social history, and examination findings as documented by others; and I examined the patient myself. I personally reviewed the relevant tests, images, and reports as documented above. I formulated and edited as necessary the assessment and plan and discussed the findings and management plan with the patient.  -Oanh Donnelly MD        Again, thank you for allowing me to participate in the care of your patient.      Sincerely,    Oanh Donnelly MD

## 2020-10-27 NOTE — PROGRESS NOTES
Chief Complaint(s) and History of Present Illness(es)     Consult For     Laterality: right eye    Associated symptoms: redness, dryness (better in the past week) and   itching (mild, currently).  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               Comments     He states that his right eyelids do not close.   This has been   problematic since his last right lower lid lesion excision procedure,   which was about 2 years ago.    Caridad Somers, COT 7:56 AM  October 27, 2020      History of excision of congenital nevus excision with Dr. Rajan once in clinic and once in the operating room, along with canthopexy, and since then the eye has been red and irritated, and blurry. The right upper eyelid often everts when he sleeps. He is a stomach sleeper.     Assessment & Plan     Efrain Lock is a 45 year old male with the following diagnoses:   Encounter Diagnoses   Name Primary?     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    Plan:  Right lower eyelid ectropion repair, right upper eyelid entropion repair with pentagonal wedge.    Continue lubrication as recommended by Dr. Yost.   Attending Physician Attestation: Complete documentation of historical and exam elements from today's encounter can be found in the full encounter summary report (not reduplicated in this progress note). I personally obtained the chief complaint(s) and history of present illness. I confirmed and edited as necessary the review of systems, past medical/surgical history, family history, social history, and examination findings as documented by others; and I examined the patient myself. I personally reviewed the  relevant tests, images, and reports as documented above. I formulated and edited as necessary the assessment and plan and discussed the findings and management plan with the patient.  -Oanh Donnelly MD

## 2020-10-27 NOTE — TELEPHONE ENCOUNTER
Met with patient to schedule surgery with Dr. Donnelly    Surgery was scheduled on 12/3 at [facility]  Patient will have H&P at [facility]     Patient is aware a COVID-19 test is needed before their procedure. The test should be with-in 4 days of their procedure.   Test Details: Date 11/30 Location [facility]    Post-Op visit was scheduled on 12/17  Patient is aware a / is needed day of surgery.   Surgery packet was mailed 10/27, patient has my direct contact information for any further questions.

## 2020-10-27 NOTE — NURSING NOTE
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      Chief Complaint(s) and History of Present Illness(es)     Consult For     Laterality: right eye    Associated symptoms: redness, dryness (better in the past week) and itching (mild, currently).  Negative for eye pain    Treatments tried: ointment and artificial tears    Pain scale: 0/10    Comments: Mechanical lagophthalmos of right upper eyelid S/p multiple right facial surgeries.               Comments     He states that his right eyelids do not close.   This has been problematic since his last right lower lid lesion excision procedure, which was about 2 years ago.    Caridad Somers, NERY 7:56 AM  October 27, 2020

## 2020-11-16 ENCOUNTER — OFFICE VISIT (OUTPATIENT)
Dept: OPHTHALMOLOGY | Facility: CLINIC | Age: 45
End: 2020-11-16
Attending: OPHTHALMOLOGY
Payer: COMMERCIAL

## 2020-11-16 DIAGNOSIS — H16.211 EXPOSURE KERATOPATHY, RIGHT: ICD-10-CM

## 2020-11-16 DIAGNOSIS — H04.123 DRY EYE SYNDROME, BILATERAL: ICD-10-CM

## 2020-11-16 DIAGNOSIS — H02.132 SENILE ECTROPION OF RIGHT LOWER EYELID: ICD-10-CM

## 2020-11-16 DIAGNOSIS — H16.401 CORNEAL NEOVASCULARIZATION OF RIGHT EYE: ICD-10-CM

## 2020-11-16 DIAGNOSIS — H02.59 FLOPPY EYELID SYNDROME OF RIGHT EYE: Primary | ICD-10-CM

## 2020-11-16 DIAGNOSIS — H02.221 MECHANICAL LAGOPHTHALMOS OF RIGHT UPPER EYELID: ICD-10-CM

## 2020-11-16 PROCEDURE — G0463 HOSPITAL OUTPT CLINIC VISIT: HCPCS

## 2020-11-16 PROCEDURE — 99213 OFFICE O/P EST LOW 20 MIN: CPT | Performed by: OPHTHALMOLOGY

## 2020-11-16 ASSESSMENT — SLIT LAMP EXAM - LIDS: COMMENTS: NORMAL

## 2020-11-16 ASSESSMENT — VISUAL ACUITY
OS_SC: 20/25
OD_SC: 20/30
OD_PH_SC: 20/25
METHOD: SNELLEN - LINEAR

## 2020-11-16 ASSESSMENT — TONOMETRY
OD_IOP_MMHG: 09
IOP_METHOD: ICARE
OS_IOP_MMHG: 11

## 2020-11-16 ASSESSMENT — CONF VISUAL FIELD
OS_NORMAL: 1
OD_NORMAL: 1

## 2020-11-16 NOTE — PROGRESS NOTES
HPI:  Efrain Lock is a 45 year old male with a history of right lower eyelid/upper cheek mass present since around 2014. He had this excised (benign), and then it recurred, and he had a surgery for this as well as right upper eyelid blepharoplasty and right lower eyelid canthopexy in April of 2019 by plastic surgery.     Saw Dr Donnelly in oculoplastics and is schedule for ectropion repair of the lower lid as well as wedge resection for floppy eyelids.   States that his symptoms have improved since starting drops and gel to the eyes.  No new complaints today.    POH:  - No Eye issues in the past. No Glass usage. No CTL.   - Taking liquigel at night and ATs6 times a day.   - Mass on right lower eye lid/upper cheek s/p removal - (Lid tightening upper and lower lid - Dec, 2018), Right upper lid blepharoplasty, right lower lid canthopexy,  Excision of recurrent right lower lid cyst (benign) - 4/2019.    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 Scheduled for surgery in December with Oculoplastics.  Right eye exposure keratopathy - medial lagophthalmos with S-shape to lid margin. Surface still with PEE and irregular epithelium, but does appear improved compared to last visit. Anticipate improved lid position after procedures planned by Dr. Donnelly will contribute to further improvement.   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 Related to exposure keratopathy. On review of plastic surgery note from 2016, he noted blurred vision in the right eye at that time. Corneal sensation normal.   Plan:  Monitor    -----------------------------------------------------------------------------------    Patient disposition:   Return in about 3 months (around 2/16/2021) for ocular surface check, or sooner as needed.    Corine Diaz MD  Ophthalmology Resident     Teaching statement:  Complete documentation of historical and exam elements from today's encounter can be found in the full encounter summary report (not reduplicated in this progress note). I personally obtained the chief complaint(s) and history of present illness.  I confirmed and edited as necessary the review of systems, past medical/surgical history, family history, social history, and examination findings as documented by others; and I examined the patient myself. I personally reviewed the relevant tests, images, and reports as documented above.     I formulated and edited as necessary the assessment and plan and discussed the findings and management plan with the patient and family.      Roberta Yost MD  Comprehensive Ophthalmology & Ocular Pathology  Department of Ophthalmology and Visual Neurosciences  [email or web address]  Pager [phone]

## 2020-11-16 NOTE — NURSING NOTE
Chief Complaints and History of Present Illnesses   Patient presents with     Follow Up     Chief Complaint(s) and History of Present Illness(es)     Follow Up     Laterality: right eye    Onset: 3 weeks ago              Comments     Floppy eyelid syndrome of right eye-Pt. States that he is doing well. Not having as much itching. No pain BE. No change in VA BE.  Lynette Valdes COT 9:05 AM November 16, 2020

## 2020-11-27 ENCOUNTER — DOCUMENTATION ONLY (OUTPATIENT)
Dept: LAB | Facility: CLINIC | Age: 45
End: 2020-11-27

## 2020-11-27 NOTE — PROGRESS NOTES
Patient has appointment for pre-surgical Covid test, but no order has been placed.  Please place future order or forward to proper provider.    Thanks,  [facility]

## 2020-11-30 ENCOUNTER — OFFICE VISIT (OUTPATIENT)
Dept: FAMILY MEDICINE | Facility: CLINIC | Age: 45
End: 2020-11-30
Payer: COMMERCIAL

## 2020-11-30 VITALS
BODY MASS INDEX: 41.15 KG/M2 | HEIGHT: 65 IN | DIASTOLIC BLOOD PRESSURE: 72 MMHG | TEMPERATURE: 97.7 F | SYSTOLIC BLOOD PRESSURE: 110 MMHG | HEART RATE: 82 BPM | WEIGHT: 247 LBS | OXYGEN SATURATION: 100 %

## 2020-11-30 DIAGNOSIS — H02.59 FLOPPY EYELID SYNDROME: Primary | ICD-10-CM

## 2020-11-30 DIAGNOSIS — H02.59 FLOPPY EYELID SYNDROME: ICD-10-CM

## 2020-11-30 DIAGNOSIS — Z01.818 PREOP GENERAL PHYSICAL EXAM: Primary | ICD-10-CM

## 2020-11-30 DIAGNOSIS — H02.103 ECTROPION OF RIGHT EYE, UNSPECIFIED ECTROPION TYPE, UNSPECIFIED EYELID: ICD-10-CM

## 2020-11-30 PROCEDURE — U0003 INFECTIOUS AGENT DETECTION BY NUCLEIC ACID (DNA OR RNA); SEVERE ACUTE RESPIRATORY SYNDROME CORONAVIRUS 2 (SARS-COV-2) (CORONAVIRUS DISEASE [COVID-19]), AMPLIFIED PROBE TECHNIQUE, MAKING USE OF HIGH THROUGHPUT TECHNOLOGIES AS DESCRIBED BY CMS-2020-01-R: HCPCS | Performed by: OPHTHALMOLOGY

## 2020-11-30 PROCEDURE — 99214 OFFICE O/P EST MOD 30 MIN: CPT | Performed by: NURSE PRACTITIONER

## 2020-11-30 ASSESSMENT — MIFFLIN-ST. JEOR: SCORE: 1922.26

## 2020-11-30 NOTE — PROGRESS NOTES
[address] [address]  Phone: [phone]  Primary Provider: Henry Cesar  Pre-op Performing Provider: GEORGETTE BENITEZ    PREOPERATIVE EVALUATION:  Today's date: 11/30/2020    Efrain Lock is a 45 year old male who presents for a preoperative evaluation.    Surgical Information:  Surgery/Procedure: Right lower eyelid ectropion repair  Surgery Location: [facility]  Surgeon: Oanh Donnelly MD  Surgery Date: 12/3/2020  Time of Surgery: 850 am  Where patient plans to recover: At home with family  Fax number for surgical facility: Note does not need to be faxed, will be available electronically in Epic.    Type of Anesthesia Anticipated: Local with MAC    Subjective     HPI related to upcoming procedure: Issues with right eye ectropion. Will have surgery next week.     Preoperative Questionnaire:   No - Have you ever had a heart attack or stroke?  No - Have you ever had surgery on your heart or blood vessels, such as a stent, coronary (heart) bypass, or surgery on an artery in the head, neck, heart, or legs?  No - Do you have chest pain when you are physically active?  No - Do you have a history of heart failure?  No - Do you currently have a cold, bronchitis, or symptoms of other respiratory (head and chest) infections?  No - Do you have a cough, shortness of breath, or wheezing?  No - Do you or anyone in your family have a history of blood clots?  No - Do you or anyone in your family have a serious bleeding problem, such as long-lasting bleeding after surgeries or cuts?  No - Have you ever had anemia or been told to take iron pills?  No - Have you had any abnormal blood loss such as black, tarry or bloody stools, or abnormal vaginal bleeding?  No - Have you ever had a blood transfusion?  Yes - Are you willing to have a blood transfusion if it is medically needed before, during, or after your  surgery?  No - Have you or anyone in your family ever had problems with anesthesia (sedation for surgery)?  No - Do you have sleep apnea, excessive snoring, or daytime drowsiness?   No - Do you have any artifical heart valves or other implanted medical devices, such as a pacemaker, defibrillator, or continuous glucose monitor?  No - Do you have any artifical joints?  No - Are you allergic to latex?  No - Is there any chance that you may be pregnant?    Patient has a Health Care Directive or Living Will:  NO      No flowsheet data found.    Health Care Directive:  Patient does not have a Health Care Directive or Living Will: Discussed advance care planning with patient; however, patient declined at this time.    Preoperative Review of :   reviewed - no record of controlled substances prescribed.      Status of Chronic Conditions:  See problem list for active medical problems.  Problems all longstanding and stable, except as noted/documented.  See ROS for pertinent symptoms related to these conditions.      Review of Systems  CONSTITUTIONAL: NEGATIVE for fever, chills, change in weight  INTEGUMENTARY/SKIN: NEGATIVE for worrisome rashes, moles or lesions  EYES: NEGATIVE for vision changes or irritation  ENT/MOUTH: NEGATIVE for ear, mouth and throat problems  RESP: NEGATIVE for significant cough or SOB  BREAST: NEGATIVE for masses, tenderness or discharge  CV: NEGATIVE for chest pain, palpitations or peripheral edema  GI: NEGATIVE for nausea, abdominal pain, heartburn, or change in bowel habits  : NEGATIVE for frequency, dysuria, or hematuria  MUSCULOSKELETAL: NEGATIVE for significant arthralgias or myalgia  NEURO: NEGATIVE for weakness, dizziness or paresthesias  ENDOCRINE: NEGATIVE for temperature intolerance, skin/hair changes  HEME: NEGATIVE for bleeding problems  PSYCHIATRIC: NEGATIVE for changes in mood or affect    Patient Active Problem List    Diagnosis Date Noted     Floppy eyelid syndrome of right eye  10/27/2020     Priority: Medium     Added automatically from request for surgery [id number]       Senile ectropion of right lower eyelid 10/27/2020     Priority: Medium     Added automatically from request for surgery [id number]       Mechanical entropion of right eye 10/27/2020     Priority: Medium     Added automatically from request for surgery [id number]       Corneal scar 10/27/2020     Priority: Medium     Added automatically from request for surgery [id number]       Left shoulder pain 06/26/2018     Priority: Medium     Morbid obesity (H) 06/25/2018     Priority: Medium     Family history of prostate cancer 06/25/2018     Priority: Medium     Microalbuminuria 06/25/2018     Priority: Medium     Hypertriglyceridemia 06/25/2018     Priority: Medium     Tobacco abuse 07/12/2012     Priority: Medium     Essential hypertension, benign 07/12/2012     Priority: Medium      Past Medical History:   Diagnosis Date     HTN (hypertension)      Microalbuminuria 10/30/2013     Tobacco abuse 7/12/2012     Past Surgical History:   Procedure Laterality Date     REPAIR PTOSIS       SURGICAL HISTORY OF -       rt knee surgery     Current Outpatient Medications   Medication Sig Dispense Refill     amLODIPine (NORVASC) 10 MG tablet Take 1 tablet (10 mg) by mouth daily 90 tablet 3     carboxymethylcellulose PF (REFRESH LIQUIGEL) 1 % ophthalmic gel Place 1 drop into both eyes every 2 hours (while awake) 1 Bottle 11     losartan (COZAAR) 100 MG tablet Take 1 tablet (100 mg) by mouth daily 90 tablet 0     metoprolol succinate ER (TOPROL-XL) 50 MG 24 hr tablet Take 1 tablet (50 mg) by mouth daily 90 tablet 3     Multiple Vitamins-Minerals (MULTIVITAMIN ADULT PO)        White Petrolatum-Mineral Oil (REFRESH P.M.) OINT Apply 3.5 g to eye At Bedtime 1 Tube 11       Allergies   Allergen Reactions     Lisinopril Cough        Social History     Tobacco Use     Smoking status: Current Every Day Smoker     Packs/day: 0.30     Types: Cigarettes      "Smokeless tobacco: Never Used   Substance Use Topics     Alcohol use: No     Alcohol/week: 0.0 standard drinks     Family History   Problem Relation Age of Onset     Prostate Cancer Father 80     Hypertension Paternal Grandmother      Colon Cancer No family hx of      Diabetes No family hx of      Myocardial Infarction No family hx of      History   Drug Use No         Objective     /72 (BP Location: Right arm, Cuff Size: Adult Large)   Pulse 82   Temp 97.7  F (36.5  C) (Tympanic)   Ht 1.651 m (5' 5\")   Wt 112 kg (247 lb)   SpO2 100%   BMI 41.10 kg/m      Physical Exam    GENERAL APPEARANCE: healthy, alert and no distress     EYES: EOMI,  PERRL     HENT: ear canals and TM's normal and nose and mouth without ulcers or lesions     NECK: no adenopathy, no asymmetry, masses, or scars and thyroid normal to palpation     RESP: lungs clear to auscultation - no rales, rhonchi or wheezes     CV: regular rates and rhythm, normal S1 S2, no S3 or S4 and no murmur, click or rub     ABDOMEN:  soft, nontender, no HSM or masses and bowel sounds normal     MS: extremities normal- no gross deformities noted, no evidence of inflammation in joints, FROM in all extremities.     SKIN: no suspicious lesions or rashes     NEURO: Normal strength and tone, sensory exam grossly normal, mentation intact and speech normal     PSYCH: mentation appears normal. and affect normal/bright     LYMPHATICS: No cervical adenopathy    Recent Labs   Lab Test 02/28/20  0955 08/08/19  1021   HGB 15.3  --     144   POTASSIUM 3.2* 3.8   CR 1.08 1.05        Diagnostics:  No results found for this or any previous visit (from the past 48 hour(s)).   No EKG required for low risk surgery (cataract, skin procedure, breast biopsy, etc).    Revised Cardiac Risk Index (RCRI):  The patient has the following serious cardiovascular risks for perioperative complications:   - No serious cardiac risks = 0 points     RCRI Interpretation: 0 points: Class I " (very low risk - 0.4% complication rate)           Assessment & Plan   The proposed surgical procedure is considered LOW risk.    Efrain was seen today for pre-op exam.    Diagnoses and all orders for this visit:    Preop general physical exam    Ectropion of right eye, unspecified ectropion type, unspecified eyelid        Risks and Recommendations:  The patient has the following additional risks and recommendations for perioperative complications:   - No identified additional risk factors other than previously addressed    Medication Instructions:   - Beta Blockers: Continue taking on the day of surgery.    RECOMMENDATION:  APPROVAL GIVEN to proceed with proposed procedure, without further diagnostic evaluation.    Signed Electronically by: Anuj Garcia NP    Copy of this evaluation report is provided to requesting physician.    Preop [facility] Preop Guidelines    Revised Cardiac Risk Index

## 2020-11-30 NOTE — PATIENT INSTRUCTIONS

## 2020-12-01 LAB
SARS-COV-2 RNA SPEC QL NAA+PROBE: NOT DETECTED
SPECIMEN SOURCE: NORMAL

## 2020-12-02 ENCOUNTER — ANESTHESIA EVENT (OUTPATIENT)
Dept: SURGERY | Facility: AMBULATORY SURGERY CENTER | Age: 45
End: 2020-12-02

## 2020-12-03 ENCOUNTER — HOSPITAL ENCOUNTER (OUTPATIENT)
Facility: AMBULATORY SURGERY CENTER | Age: 45
Discharge: HOME | End: 2020-12-03
Attending: OPHTHALMOLOGY | Admitting: OPHTHALMOLOGY
Payer: COMMERCIAL

## 2020-12-03 ENCOUNTER — ANESTHESIA (OUTPATIENT)
Dept: SURGERY | Facility: AMBULATORY SURGERY CENTER | Age: 45
End: 2020-12-03

## 2020-12-03 VITALS
TEMPERATURE: 97.5 F | WEIGHT: 247 LBS | BODY MASS INDEX: 41.15 KG/M2 | HEART RATE: 70 BPM | SYSTOLIC BLOOD PRESSURE: 153 MMHG | DIASTOLIC BLOOD PRESSURE: 75 MMHG | HEIGHT: 65 IN | OXYGEN SATURATION: 98 % | RESPIRATION RATE: 16 BRPM

## 2020-12-03 DIAGNOSIS — H02.132 SENILE ECTROPION OF RIGHT LOWER EYELID: ICD-10-CM

## 2020-12-03 DIAGNOSIS — H17.9 CORNEAL SCAR: ICD-10-CM

## 2020-12-03 DIAGNOSIS — H02.59 FLOPPY EYELID SYNDROME OF RIGHT EYE: ICD-10-CM

## 2020-12-03 DIAGNOSIS — H02.023 MECHANICAL ENTROPION OF RIGHT EYE: ICD-10-CM

## 2020-12-03 PROCEDURE — 67966 REVISION OF EYELID: CPT | Mod: E3

## 2020-12-03 PROCEDURE — 88302 TISSUE EXAM BY PATHOLOGIST: CPT | Performed by: OPHTHALMOLOGY

## 2020-12-03 PROCEDURE — 67917 REPAIR EYELID DEFECT: CPT | Mod: E4

## 2020-12-03 RX ORDER — ACETAMINOPHEN 325 MG/1
975 TABLET ORAL ONCE
Status: COMPLETED | OUTPATIENT
Start: 2020-12-03 | End: 2020-12-03

## 2020-12-03 RX ORDER — SODIUM CHLORIDE, SODIUM LACTATE, POTASSIUM CHLORIDE, CALCIUM CHLORIDE 600; 310; 30; 20 MG/100ML; MG/100ML; MG/100ML; MG/100ML
INJECTION, SOLUTION INTRAVENOUS CONTINUOUS
Status: DISCONTINUED | OUTPATIENT
Start: 2020-12-03 | End: 2020-12-03 | Stop reason: HOSPADM

## 2020-12-03 RX ORDER — ERYTHROMYCIN 5 MG/G
0.5 OINTMENT OPHTHALMIC 3 TIMES DAILY
Qty: 3.5 G | Refills: 0 | Status: SHIPPED | OUTPATIENT
Start: 2020-12-03 | End: 2020-12-23

## 2020-12-03 RX ORDER — TETRACAINE HYDROCHLORIDE 5 MG/ML
SOLUTION OPHTHALMIC PRN
Status: DISCONTINUED | OUTPATIENT
Start: 2020-12-03 | End: 2020-12-03 | Stop reason: HOSPADM

## 2020-12-03 RX ORDER — SODIUM CHLORIDE, SODIUM LACTATE, POTASSIUM CHLORIDE, CALCIUM CHLORIDE 600; 310; 30; 20 MG/100ML; MG/100ML; MG/100ML; MG/100ML
INJECTION, SOLUTION INTRAVENOUS CONTINUOUS
Status: DISCONTINUED | OUTPATIENT
Start: 2020-12-03 | End: 2020-12-04 | Stop reason: HOSPADM

## 2020-12-03 RX ORDER — NEOMYCIN POLYMYXIN B SULFATES AND DEXAMETHASONE 3.5; 10000; 1 MG/ML; [USP'U]/ML; MG/ML
SUSPENSION/ DROPS OPHTHALMIC
Qty: 5 ML | Refills: 0 | Status: SHIPPED | OUTPATIENT
Start: 2020-12-03 | End: 2020-12-23

## 2020-12-03 RX ORDER — FENTANYL CITRATE 50 UG/ML
25-50 INJECTION, SOLUTION INTRAMUSCULAR; INTRAVENOUS
Status: DISCONTINUED | OUTPATIENT
Start: 2020-12-03 | End: 2020-12-04 | Stop reason: HOSPADM

## 2020-12-03 RX ORDER — LIDOCAINE 40 MG/G
CREAM TOPICAL
Status: DISCONTINUED | OUTPATIENT
Start: 2020-12-03 | End: 2020-12-03 | Stop reason: HOSPADM

## 2020-12-03 RX ORDER — FENTANYL CITRATE 50 UG/ML
INJECTION, SOLUTION INTRAMUSCULAR; INTRAVENOUS PRN
Status: DISCONTINUED | OUTPATIENT
Start: 2020-12-03 | End: 2020-12-03

## 2020-12-03 RX ORDER — ONDANSETRON 4 MG/1
4 TABLET, ORALLY DISINTEGRATING ORAL EVERY 30 MIN PRN
Status: DISCONTINUED | OUTPATIENT
Start: 2020-12-03 | End: 2020-12-04 | Stop reason: HOSPADM

## 2020-12-03 RX ORDER — NALOXONE HYDROCHLORIDE 0.4 MG/ML
0.4 INJECTION, SOLUTION INTRAMUSCULAR; INTRAVENOUS; SUBCUTANEOUS
Status: DISCONTINUED | OUTPATIENT
Start: 2020-12-03 | End: 2020-12-04 | Stop reason: HOSPADM

## 2020-12-03 RX ORDER — PROPOFOL 10 MG/ML
INJECTION, EMULSION INTRAVENOUS PRN
Status: DISCONTINUED | OUTPATIENT
Start: 2020-12-03 | End: 2020-12-03

## 2020-12-03 RX ORDER — ONDANSETRON 2 MG/ML
4 INJECTION INTRAMUSCULAR; INTRAVENOUS EVERY 30 MIN PRN
Status: DISCONTINUED | OUTPATIENT
Start: 2020-12-03 | End: 2020-12-04 | Stop reason: HOSPADM

## 2020-12-03 RX ORDER — ERYTHROMYCIN 5 MG/G
0.5 OINTMENT OPHTHALMIC 3 TIMES DAILY
Qty: 3.5 G | Refills: 0 | Status: SHIPPED | OUTPATIENT
Start: 2020-12-03 | End: 2020-12-03

## 2020-12-03 RX ORDER — LIDOCAINE HYDROCHLORIDE 20 MG/ML
INJECTION, SOLUTION INFILTRATION; PERINEURAL PRN
Status: DISCONTINUED | OUTPATIENT
Start: 2020-12-03 | End: 2020-12-03

## 2020-12-03 RX ORDER — GABAPENTIN 300 MG/1
300 CAPSULE ORAL ONCE
Status: COMPLETED | OUTPATIENT
Start: 2020-12-03 | End: 2020-12-03

## 2020-12-03 RX ORDER — NALOXONE HYDROCHLORIDE 0.4 MG/ML
0.2 INJECTION, SOLUTION INTRAMUSCULAR; INTRAVENOUS; SUBCUTANEOUS
Status: DISCONTINUED | OUTPATIENT
Start: 2020-12-03 | End: 2020-12-04 | Stop reason: HOSPADM

## 2020-12-03 RX ADMIN — PROPOFOL 100 MG: 10 INJECTION, EMULSION INTRAVENOUS at 09:05

## 2020-12-03 RX ADMIN — FENTANYL CITRATE 50 MCG: 50 INJECTION, SOLUTION INTRAMUSCULAR; INTRAVENOUS at 09:16

## 2020-12-03 RX ADMIN — SODIUM CHLORIDE, SODIUM LACTATE, POTASSIUM CHLORIDE, CALCIUM CHLORIDE: 600; 310; 30; 20 INJECTION, SOLUTION INTRAVENOUS at 08:11

## 2020-12-03 RX ADMIN — GABAPENTIN 300 MG: 300 CAPSULE ORAL at 07:52

## 2020-12-03 RX ADMIN — ACETAMINOPHEN 975 MG: 325 TABLET ORAL at 07:51

## 2020-12-03 RX ADMIN — LIDOCAINE HYDROCHLORIDE 100 MG: 20 INJECTION, SOLUTION INFILTRATION; PERINEURAL at 09:05

## 2020-12-03 ASSESSMENT — MIFFLIN-ST. JEOR: SCORE: 1922.26

## 2020-12-03 NOTE — ANESTHESIA PREPROCEDURE EVALUATION
"Anesthesia Pre-Procedure Evaluation    Patient: Efrain Lock   MRN:     [id number] Gender:   male   Age:    45 year old :      1975        Preoperative Diagnosis: Floppy eyelid syndrome of right eye [H02.59]  Senile ectropion of right lower eyelid [H02.132]  Mechanical entropion of right eye [H02.023]  Corneal scar [H17.9]   Procedure(s):  Right lower eyelid ectropion repair  Right upper eyelid pentagonal wedge     LABS:  CBC:   Lab Results   Component Value Date    HGB 15.3 2020     BMP:   Lab Results   Component Value Date     2020     2019    POTASSIUM 3.2 (L) 2020    POTASSIUM 3.8 2019    CHLORIDE 109 2020    CHLORIDE 109 2019    CO2 21 2020    CO2 26 2019    BUN 11 2020    BUN 10 2019    CR 1.08 2020    CR 1.05 2019     (H) 2020    GLC 94 2019     COAGS: No results found for: PTT, INR, FIBR  POC: No results found for: BGM, HCG, HCGS  OTHER:   Lab Results   Component Value Date    A1C 6.0 2017    SUMAYA 8.6 2020    ALBUMIN 3.8 2020    PROTTOTAL 8.1 2020    ALT 51 2020    AST 25 2020    ALKPHOS 149 2020    BILITOTAL 0.4 2020    TSH 1.86 2020        Preop Vitals    BP Readings from Last 3 Encounters:   20 (!) 141/88   20 110/72   20 134/88    Pulse Readings from Last 3 Encounters:   20 82   20 75   20 83      Resp Readings from Last 3 Encounters:   20 16   17 16   17 16    SpO2 Readings from Last 3 Encounters:   20 98%   20 100%   20 100%      Temp Readings from Last 1 Encounters:   20 36.6  C (97.8  F) (Temporal)    Ht Readings from Last 1 Encounters:   20 1.651 m (5' 5\")      Wt Readings from Last 1 Encounters:   20 112 kg (247 lb)    Estimated body mass index is 41.1 kg/m  as calculated from the following:    Height as of this encounter: 1.651 m (5' " "5\").    Weight as of this encounter: 112 kg (247 lb).     LDA:  Peripheral IV 12/03/20 Left Hand (Active)   Site Assessment WDL 12/03/20 0810   Line Status Infusing 12/03/20 0810   Dressing Intervention New dressing  12/03/20 0810   Phlebitis Scale 0-->no symptoms 12/03/20 0810   Infiltration Scale 0 12/03/20 0810   Number of days: 0        Past Medical History:   Diagnosis Date     HTN (hypertension)      Microalbuminuria 10/30/2013     Tobacco abuse 7/12/2012      Past Surgical History:   Procedure Laterality Date     REPAIR PTOSIS       SURGICAL HISTORY OF -       rt knee surgery      Allergies   Allergen Reactions     Lisinopril Cough        Anesthesia Evaluation     . Pt has had prior anesthetic. Type: General           ROS/MED HX    ENT/Pulmonary:  - neg pulmonary ROS     Neurologic:  - neg neurologic ROS     Cardiovascular:     (+) hypertension----. : . . . :. .       METS/Exercise Tolerance:  3 - Able to walk 1-2 blocks without stopping   Hematologic:  - neg hematologic  ROS       Musculoskeletal:  - neg musculoskeletal ROS       GI/Hepatic:  - neg GI/hepatic ROS       Renal/Genitourinary:  - ROS Renal section negative       Endo:  - neg endo ROS       Psychiatric:  - neg psychiatric ROS       Infectious Disease:  - neg infectious disease ROS       Malignancy:         Other:                         PHYSICAL EXAM:   Mental Status/Neuro: A/A/O   Airway: Facies: Feasible  Mallampati: I  Mouth/Opening: Full  TM distance: > 6 cm  Neck ROM: Full   Respiratory: Auscultation: CTAB     Resp. Rate: Normal     Resp. Effort: Normal      CV: Rhythm: Regular  Rate: Age appropriate  Heart: Normal Sounds  Edema: None   Comments:      Dental: Normal Dentition                Assessment:   ASA SCORE: 2    H&P: History and physical reviewed and following examination; no interval change.   Smoking Status:  Non-Smoker/Unknown   NPO Status: NPO Appropriate     Plan:   Anes. Type:  MAC   Pre-Medication: None   Induction:  N/a "   Airway: Native Airway   Access/Monitoring: PIV   Maintenance: N/a     Postop Plan:   Postop Pain: None  Postop Sedation/Airway: Not planned  Disposition: Outpatient     PONV Management:   Adult Risk Factors:, Non-Smoker   Prevention: Ondansetron, Dexamethasone     CONSENT: Direct conversation   Plan and risks discussed with: Patient                      Mason Kent MD, MD

## 2020-12-03 NOTE — OP NOTE
PREOPERATIVE DIAGNOSES:   1. 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   2. Right lower eyelid ectropion.   3. 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   2. Right lower eyelid ectropion.   3. 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 Right upper eyelid full thickness wedge resection (greater than 25% of eyelid).   2. Right lower eyelid ectropion repair with lateral tarsal strip procedure.   ANESTHESIA: Monitored with local infiltration of a 50/50 mixture of 2% lidocaine with epinephrine and 0.5% Marcaine.   SURGEON: Oanh Donnelly MD   ASSISTANT: Stephanie Ortiz MD      COMPLICATIONS: None.   ESTIMATED BLOOD LOSS: Less than 5 mL.   HISTORY: Efrain Lock  presented with upper lid entropion and dermatochalasis interfering with his superior visual field and activities of daily living. Efrain Lock  also had lower lid ectropion with tearing. After the risks, benefits and alternatives to the proposed procedures were explained, informed consent was obtained.   DESCRIPTION OF PROCEDURE: The patient was brought to the operating room and placed supine on the operating table. IV sedation was given. The lateral 1/3 of the eyelid was marked and the excess skin marked as well. These areas were infiltrated with local anesthetic as was the lower lid and lateral canthal areas. He was prepped and draped in the typical sterile fashion for oculoplastic surgery. Attention was directed to the right side. Lateral canthal incision made with a 15 blade and dissection carried down to the orbicularis with high temperature cautery. Lateral canthotomy and inferior cantholysis was performed. Lateral tarsal strip was fashioned and secured to the lateral orbital rim periosteum with a double-armed 5-0 PDS suture in a horizontal mattress fashion. Lateral canthal angle  was closed with a gray line to gray line suture of 5-0 Vicryl. Skin was closed with interrupted 6-0 plain gut sutures. Attention was directed to the upper eyelid. The full thickness wedge resection was performed using a straight iris scissor. The eyelid tissue was removed in a rectangular fashion. Hemostasis was obtained with monopolar cautery. The margin was reconstructed with a vertical mattress 6-0 Vicryl suture. Posterior lamella was closed with interrupted partial thickness 5-0 Vicryl sutures. The lash line was closed with interrupted 6-0 Vicryl suture. The remainder of the vertical wound was closed with interrupted 6-0 Vicryl and 6-0 plain gut sutures. Erythromycin ophthalmic ointment was applied to the lids and in the eyes. The patient tolerated the procedure well. Efrain Lock  tolerated all the procedures well. Antibiotic ointment was applied and he left the operating room in stable condition.   Oanh Donnelly MD

## 2020-12-03 NOTE — DISCHARGE INSTRUCTIONS
Post-operative Instructions  Ophthalmic Plastic and Reconstructive Surgery    Oanh Donnelly M.D.     All instructions apply to the operated eye(s) or eyelid(s).    Wound care and personal care  ? Apply ice compresses 15 minutes of every hour while awake for 2 days. As long as there is no further bleeding, after two days, switch to warm water compresses for five minutes, four times a day until seen by your physician.   ? You may shower or wash your hair the day after surgery. Do not go swimming for at least 2 weeks to prevent contamination of your wounds.  ? Do not apply make-up to the eyes or eyelids for 2 weeks after surgery.  ? Expect some swelling, bruising, black eye (even into the lower eyelids and cheeks). Also expect serum caking, crusting and discharge from the eye and/or incisions. A small amount of surface bleeding, and depending on the type of surgery, bleeding from the inside of the eyelid, is normal for the first 48 hours.  ? Avoid straining, bending at the waist, or lifting more than 15 pounds for 10 days. Activities that raise your blood pressure can worsen swelling, cause bleeding, and breaking of sutures. Like wise, sleeping with your head slightly elevated for the first several days can help swelling resolve more quickly.   ? Do continue to ambulate (walk) as you normally would - being sedentary after surgery can cause blood clots.   ? Your eye(s) and eyelid(s) may be painful and tender. This is normal after surgery.      Contact information and follow-up  ? Return to the Eye Clinic for a follow-up appointment with your physician as scheduled. If no appointment has been scheduled:   - [facility] eye clinic: [phone] for an appointment with Dr. Donnelly within 1 to 2 weeks from your date of surgery. If you are scheduled for a phone or video visit for your first postoperative appointment, please e-mail pictures to [email or web address] 1-2 days before your appointment.   -   [facility] eye clinic: [phone] for an appointment with Dr. Donnelly within 1 to 2 weeks from your date of surgery.     ? For severe pain, bleeding, or loss of vision, call the [facility] at [phone] or [facility] at [phone].     After hours or on weekends and holidays, call [phone] and ask to speak with the ophthalmologist on call.    An on call person can be reached after hours for concerns. The on call doctor should not call in medication refill requests after hours or on weekends, so please plan accordingly. An effort has been made to provide adequate pain medications following every surgery, and refills will not be provided in most instances. Narcotic pain medications cannot be called in.     Activity restrictions and driving  ? Avoid heavy lifting, bending, exercise or strenuous activity for 1 week after surgery. You may resume other activities and return to work as tolerated.  ? You may not resume driving if you are using narcotic pain medications (such as Norco, Percocet, Tylenol #3).    Medications  ? Restart all regular home medications and eye drops. If you take Plavix or Aspirin on a regular basis, wait for 72 hours after your surgery before restarting these in order to decrease the risk of bleeding complications.  ? Avoid aspirin and aspirin-like medications (Motrin, Aleve, Ibuprofen, [place] etc) for 72 hours to reduce the risk of bleeding. You may take Tylenol (acetaminophen) for pain.  ? In addition to your home medications, take the following post-operative medications as prescribed by your physician.    ? Apply antibiotic ointment to all sutures three times a day, and into the operated eye(s) at night. Use until follow up.  ? Instill eye drops 3 times a day until follow up.   ? In many cases, postoperative discomfort can be managed with Tylenol alone. If narcotic pain medication was prescribed, take pain pills at prescribed  frequency as needed for pain.      [facility]  Home Care Following Anesthesia  For 24 hours after surgery:  1. Get plenty of rest.  A responsible adult must stay with you for at least 24 hours after you leave the surgery center.  2. Do not drive or use heavy equipment.  If you have weakness or tingling, don't drive or use heavy equipment until this feeling goes away.   3. Do not drink alcohol.   4. Avoid strenuous or risky activities.  Ask for help when climbing stairs.  5. You may feel lightheaded.  IF so, sit for a few minutes before standing.  Have someone help you get up.   6. If you have nausea (feel sick to your stomach): Drink only clear liquids such as apple juice, ginger ale, broth or 7-Up.  Rest may also help.  Be sure to drink enough fluids.  Move to a regular diet as you feel able.   7. You may have a slight fever.  Call the doctor if your fever is over 100 F (37.7 C) (taken under the tongue) or lasts longer than 24 hours.  8. You may have a dry mouth, a sore throat, muscle aches or trouble sleeping. These should go away after 24 hours.  9. Do not make important or legal decisions.               Tips for taking pain medications  To get the best pain relief possible, remember these points:    Take pain medications as directed, before pain becomes severe.    Pain medication can upset your stomach: taking it with food may help.    Constipation is a common side effect of pain medication. Drink plenty of  fluids.    Eat foods high in fiber. Take a stool softener if recommended by your doctor or pharmacist.    Do not drink alcohol, drive or operate machinery while taking pain medications.    Ask about other ways to control pain, such as with heat, ice or relaxation.    Tylenol/Acetaminophen Consumption  To help encourage the safe use of acetaminophen, the makers of TYLENOL  have lowered the maximum daily dose for single-ingredient Extra Strength TYLENOL  (acetaminophen)  products sold in the [place] from 8 pills per day (4,000 mg) to 6 pills per day (3,000 mg). The dosing interval has also changed from 2 pills every 4-6 hours to 2 pills every 6 hours.    If you feel your pain relief is insufficient, you may take Tylenol/Acetaminophen in addition to your narcotic pain medication.     Be careful not to exceed 3,000 mg of Tylenol/Acetaminophen in a 24 hour period from all sources.    If you are taking extra strength Tylenol/acetaminophen (500 mg), the maximum dose is 6 tablets in 24 hours.    If you are taking regular strength acetaminophen (325 mg), the maximum dose is 9 tablets in 24 hours.    Call a doctor for any of the followin. Signs of infection (fever, growing tenderness at the surgery site, a large amount of drainage or bleeding, severe pain, foul-smelling drainage, redness, swelling).  2. It has been over 8 to 10 hours since surgery and you are still not able to urinate (pass water).  3. Headache for over 24 hours.  4. Numbness, tingling or weakness the day after surgery (if you had spinal anesthesia).  5. Signs of Covid-19 infection (temperature over 100 degrees, shortness of breath, cough, loss of taste/smell, generalized body aches, persistent headache, chills, sore throat, nausea/vomiting/diarrhea)  Your doctor is:       Dr. Oanh Donnelly, Ophthalmology: [phone]               Or dial [phone] and ask for the resident on call for:  Ophthalmology  For emergency care, call the:  [facility]:  [phone] (TTY for hearing impaired: [phone])

## 2020-12-03 NOTE — ANESTHESIA CARE TRANSFER NOTE
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    Anesthesia Type:   No value filed.     Note:  Airway :Room Air  Patient transferred to:Phase II  Comments: VSS and WNL, comfortable, no PONV, report to Sera CHAVARRIAHandoff Report: Identifed the Patient, Identified the Reponsible Provider, Reviewed the pertinent medical history, Discussed the surgical course, Reviewed Intra-OP anesthesia mangement and issues during anesthesia, Set expectations for post-procedure period and Allowed opportunity for questions and acknowledgement of understanding      Vitals: (Last set prior to Anesthesia Care Transfer)    CRNA VITALS  12/3/2020 0909 - 12/3/2020 0944      12/3/2020             Pulse:  71    SpO2:  98 %    Resp Rate (set):  10                Electronically Signed By: MIKEL Bhandari CRNA  December 3, 2020  9:44 AM

## 2020-12-03 NOTE — BRIEF OP NOTE
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

## 2020-12-03 NOTE — ANESTHESIA POSTPROCEDURE EVALUATION
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     Anesthesia Type: No value filed.       Disposition: Outpatient   Postop Pain Control: Uneventful            Sign Out: Well controlled pain   PONV: No   Neuro/Psych: Uneventful            Sign Out: Acceptable/Baseline neuro status   Airway/Respiratory: Uneventful            Sign Out: Acceptable/Baseline resp. status   CV/Hemodynamics: Uneventful            Sign Out: Acceptable CV status   Other NRE: NONE   DID A NON-ROUTINE EVENT OCCUR? No         Last Anesthesia Record Vitals:  CRNA VITALS  12/3/2020 0909 - 12/3/2020 1009      12/3/2020             Pulse:  71    SpO2:  98 %    Resp Rate (set):  10          Last PACU Vitals:  No vitals data found for the desired time range.        Electronically Signed By: Mason Kent MD, MD, December 3, 2020, 10:24 AM

## 2020-12-04 LAB — COPATH REPORT: NORMAL

## 2020-12-08 ENCOUNTER — MYC MEDICAL ADVICE (OUTPATIENT)
Dept: OPHTHALMOLOGY | Facility: CLINIC | Age: 45
End: 2020-12-08

## 2020-12-09 ENCOUNTER — VIRTUAL VISIT (OUTPATIENT)
Dept: SLEEP MEDICINE | Facility: CLINIC | Age: 45
End: 2020-12-09
Payer: COMMERCIAL

## 2020-12-09 VITALS — HEIGHT: 65 IN | WEIGHT: 245 LBS | BODY MASS INDEX: 40.82 KG/M2

## 2020-12-09 DIAGNOSIS — R29.818 SUSPECTED SLEEP APNEA: Primary | ICD-10-CM

## 2020-12-09 DIAGNOSIS — R06.81 WITNESSED APNEIC SPELLS: ICD-10-CM

## 2020-12-09 DIAGNOSIS — R06.83 SNORING: ICD-10-CM

## 2020-12-09 DIAGNOSIS — I10 ESSENTIAL HYPERTENSION: ICD-10-CM

## 2020-12-09 PROCEDURE — 99204 OFFICE O/P NEW MOD 45 MIN: CPT | Mod: 95 | Performed by: INTERNAL MEDICINE

## 2020-12-09 ASSESSMENT — MIFFLIN-ST. JEOR: SCORE: 1913.19

## 2020-12-09 NOTE — PROGRESS NOTES
"Efrain Lock is a 45 year old male who is being evaluated via a billable video visit.      The patient has been notified of following:     \"This video visit will be conducted via a call between you and your physician/provider. We have found that certain health care needs can be provided without the need for an in-person physical exam.  This service lets us provide the care you need with a video conversation.  If a prescription is necessary we can send it directly to your pharmacy.  If lab work is needed we can place an order for that and you can then stop by our lab to have the test done at a later time.    Video visits are billed at different rates depending on your insurance coverage.  Please reach out to your insurance provider with any questions.    If during the course of the call the physician/provider feels a video visit is not appropriate, you will not be charged for this service.\"    Patient has given verbal consent for Video visit? Yes      Video-Visit Details    Type of service:  Video Visit    Video Start Time: 12:45 PM  Video End Time: 1:10 PM    Originating Location (pt. Location): Home    Distant Location (provider location):  [facility]     Platform used for Video Visit: DoxyAsurvestme    Tao Prakash MD      Sleep Consultation:    Date on this visit: 12/9/2020    Efrain Lock is sent by No ref. provider found for a sleep consultation regarding possible sleep apnea.    Primary Physician: Henry Cesar     Chief complaint: snoring, witnessed apneas    Presenting History:     Efrain Lock reports nightly snoring and frequent witnessed apneas for last 10 years.     His medical history is significant for HTN, hyperlipidemia and floppy eyelid syndrome.     Efrain does snore every night. Patient does have a regular bed partner. There is report of snoring, gasping and snorting.  He does have witnessed apneas. They never sleep separately.  Patient sleeps on his back, side and " stomach. He denies no morning headaches and restless legs. Efrain denies any bruxism, sleep walking, sleep talking, dream enactment, sleep paralysis, cataplexy and hypnogogic/hypnopompic hallucinations.    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  He works afternoon shifts from 3 PM- 11:30 PM.  Efrain goes to sleep at 2:00 AM during the week. He wakes up at 11:00 AM without an alarm. He falls asleep in 30 minutes.  Efrain denies difficulty falling asleep.  He wakes up 1-2 times a night for 20 minutes before falling back to sleep.  Efrain wakes up to go to the bathroom and uncertain reasons.  On weekends, Efrain goes to sleep at 2:00 AM.  He wakes up at 11:00 AM without an alarm. He falls asleep in 30 minutes.  Patient gets an average of 8 hours of sleep per night.      Efrain denies difficulty breathing through his nose.      Patient's [place] Sleepiness score 5/24 consistent with no daytime sleepiness.      Efrain naps 3-4 times per week for 30-60 minutes, feels refreshed after naps. He takes some inadvertant naps.  He denies closing eyes, dozing and falling asleep while driving. Patient was counseled on the importance of driving while alert, to pull over if drowsy, or nap before getting into the vehicle if sleepy.      He uses 1 cups/day of coffee. Last caffeine intake is usually before noon.    Allergies:    Allergies   Allergen Reactions     Lisinopril Cough       Medications:    Current Outpatient Medications   Medication Sig Dispense Refill     amLODIPine (NORVASC) 10 MG tablet Take 1 tablet (10 mg) by mouth daily 90 tablet 3     carboxymethylcellulose PF (REFRESH LIQUIGEL) 1 % ophthalmic gel Place 1 drop into both eyes every 2 hours (while awake) 1 Bottle 11     erythromycin (ROMYCIN) 5 MG/GM ophthalmic ointment Apply 0.5 inches to eye 3 times daily Apply antibiotic ointment to all sutures three times a day, and 1/2 inch strip into the operated eye(s) at night. 3.5 g 0     losartan (COZAAR) 100 MG tablet Take 1 tablet (100 mg) by  mouth daily 90 tablet 0     metoprolol succinate ER (TOPROL-XL) 50 MG 24 hr tablet Take 1 tablet (50 mg) by mouth daily 90 tablet 3     Multiple Vitamins-Minerals (MULTIVITAMIN ADULT PO)        neomycin-polymixin-dexamethasone (MAXITROL) 0.1 % ophthalmic suspension One eye drop to both eyes three times a day for 10 days 5 mL 0     White Petrolatum-Mineral Oil (REFRESH P.M.) OINT Apply 3.5 g to eye At Bedtime 1 Tube 11       Problem List:  Patient Active Problem List    Diagnosis Date Noted     Floppy eyelid syndrome of right eye 10/27/2020     Priority: Medium     Added automatically from request for surgery [id number]       Senile ectropion of right lower eyelid 10/27/2020     Priority: Medium     Added automatically from request for surgery [id number]       Mechanical entropion of right eye 10/27/2020     Priority: Medium     Added automatically from request for surgery [id number]       Corneal scar 10/27/2020     Priority: Medium     Added automatically from request for surgery [id number]       Left shoulder pain 06/26/2018     Priority: Medium     Morbid obesity (H) 06/25/2018     Priority: Medium     Family history of prostate cancer 06/25/2018     Priority: Medium     Microalbuminuria 06/25/2018     Priority: Medium     Hypertriglyceridemia 06/25/2018     Priority: Medium     Tobacco abuse 07/12/2012     Priority: Medium     Essential hypertension, benign 07/12/2012     Priority: Medium        Past Medical/Surgical History:  Past Medical History:   Diagnosis Date     HTN (hypertension)      Microalbuminuria 10/30/2013     Tobacco abuse 7/12/2012     Past Surgical History:   Procedure Laterality Date     REPAIR ECTROPION Right 12/3/2020    Procedure: Right lower eyelid ectropion repair;  Surgeon: Oanh Donnelly MD;  Location: [facility] OR     REPAIR PTOSIS       SURGICAL HISTORY OF -       rt knee surgery     WEDGE RESECTION EYELID Right 12/3/2020    Procedure: Right upper eyelid pentagonal wedge;  Surgeon: Andrzej  MD Oanh;  [place] [facility] [place]       Social History:  Social History     Socioeconomic History     Marital status:      Spouse name:      Number of children: 2     Years of education: Not on file     Highest education level: Not on file   Occupational History     Occupation: nursing assistant   Social Needs     Financial resource strain: Not on file     Food insecurity     Worry: Not on file     Inability: Not on file     Transportation needs     Medical: Not on file     Non-medical: Not on file   Tobacco Use     Smoking status: Current Every Day Smoker     Packs/day: 0.30     Types: Cigarettes     Smokeless tobacco: Never Used   Substance and Sexual Activity     Alcohol use: No     Alcohol/week: 0.0 standard drinks     Drug use: No     Sexual activity: Yes     Partners: Female   Lifestyle     Physical activity     Days per week: Not on file     Minutes per session: Not on file     Stress: Not on file   Relationships     Social connections     Talks on phone: Not on file     Gets together: Not on file     Attends Congregational service: Not on file     Active member of club or organization: Not on file     Attends meetings of clubs or organizations: Not on file     Relationship status: Not on file     Intimate partner violence     Fear of current or ex partner: Not on file     Emotionally abused: Not on file     Physically abused: Not on file     Forced sexual activity: Not on file   Other Topics Concern     Parent/sibling w/ CABG, MI or angioplasty before 65F 55M? Not Asked   Social History Narrative     Not on file       Family History:  Family History   Problem Relation Age of Onset     Prostate Cancer Father 80     Hypertension Paternal Grandmother      Colon Cancer No family hx of      Diabetes No family hx of      Myocardial Infarction No family hx of        Review of Systems:  A complete review of systems reviewed by me is negative with the exeption of what has been mentioned in the history of present  "illness.  CONSTITUTIONAL: NEGATIVE for weight gain/loss, fever, chills, sweats or night sweats, drug allergies.  EYES: NEGATIVE for changes in vision, blind spots, double vision.  ENT: NEGATIVE for ear pain, sore throat, sinus pain, post-nasal drip, runny nose, bloody nose  CARDIAC: NEGATIVE for fast heartbeats or fluttering in chest, chest pain or pressure, breathlessness when lying flat, swollen legs or swollen feet.  NEUROLOGIC: NEGATIVE headaches, weakness or numbness in the arms or legs.  DERMATOLOGIC: NEGATIVE for rashes, new moles or change in mole(s)  PULMONARY: NEGATIVE SOB at rest, SOB with activity, dry cough, productive cough, coughing up blood, wheezing or whistling when breathing.    GASTROINTESTINAL: NEGATIVE for nausea or vomitting, loose or watery stools, fat or grease in stools, constipation, abdominal pain, bowel movements black in color or blood noted.  GENITOURINARY: NEGATIVE for pain during urination, blood in urine, urinating more frequently than usual, irregular menstrual periods.  MUSCULOSKELETAL: NEGATIVE for muscle pain, bone or joint pain, swollen joints.  ENDOCRINE: NEGATIVE for increased thirst or urination, diabetes.  LYMPHATIC: NEGATIVE for swollen lymph nodes, lumps or bumps in the breasts or nipple discharge.    Physical Examination:  Vitals: Ht 1.651 m (5' 5\")   Wt 111.1 kg (245 lb)   BMI 40.77 kg/m    BMI= Body mass index is 40.77 kg/m .         [place] Total Score 12/9/2020   Total score - [place] 5       AAMIR Total Score: 11 (12/09/20 1200)    GENERAL APPEARANCE: healthy, alert, active and no distress  EYES: Eyes grossly normal to inspection  HENT: Normocephalic   RESP: No cough, no dyspnea   MS: extremities normal- no gross deformities noted  SKIN: no suspicious lesions or rashes  NEURO: mentation intact and speech normal  PSYCH: mentation appears normal and affect normal/bright      Impression/Plan:    1. Probable Obstructive sleep apnea  2. Hypertension     - Patient is a 45 " years old male, BMI 40, with comorbid hypertension, who presents with history of loud snoring witnessed apneas, non-restorative sleep and daytime fatigue. There si a high risk for sleep apne and an overnight sleep study is recommend for assessment and treatment planning.     Plan:     1. Home sleep apnea test    Literature provided regarding sleep apnea.      He will follow up with me in approximately two weeks after his sleep study has been competed to review the results and discuss plan of care.       Polysomnography & HST reviewed.  Limitations of HST reviewed.   Obstructive sleep apnea reviewed.  Complications of untreated sleep apnea were reviewed.    Dr. Tao Prakash     CC: No ref. provider found

## 2020-12-09 NOTE — PATIENT INSTRUCTIONS
"MY TREATMENT INFORMATION FOR SLEEP APNEA-  Efrain Lock    DOCTOR : Tao Prakash MD, MD    Am I having a sleep study at a sleep center?  --->Due to insurance clearance delays, you will be contacted within 2-4 weeks to schedule    Am I having a home sleep study?  --->Watch the video for the device you are using:    /drop off device-   https://www.fivesquids.co.uk.com/watch?v=yGGFBdELGhk    Disposable device sent out require phone/computer application-   https://www.fivesquids.co.uk.com/watch?v=BCce_vbiwxE      Frequently asked questions:  1. What is Obstructive Sleep Apnea (GIULIA)? GIULIA is the most common type of sleep apnea. Apnea means, \"without breath.\"  Apnea is most often caused by narrowing or collapse of the upper airway as muscles relax during sleep.   Almost everyone has occasional apneas. Most people with sleep apnea have had brief interruptions at night frequently for many years.  The severity of sleep apnea is related to how frequent and severe the events are.   2. What are the consequences of GIULIA? Symptoms include: feeling sleepy during the day, snoring loudly, gasping or stopping of breathing, trouble sleeping, and occasionally morning headaches or heartburn at night.  Sleepiness can be serious and even increase the risk of falling asleep while driving. Other health consequences may include development of high blood pressure and other cardiovascular disease in persons who are susceptible. Untreated GIULIA  can contribute to heart disease, stroke and diabetes.   3. What are the treatment options? In most situations, sleep apnea is a lifelong disease that must be managed with daily therapy. Medications are not effective for sleep apnea and surgery is generally not considered until other therapies have been tried. Your treatment is your choice . Continuous Positive Airway (CPAP) works right away and is the therapy that is effective in nearly everyone. An oral device to hold your jaw forward is usually the next most " reliable option. Other options include postioning devices (to keep you off your back), weight loss, and surgery including a tongue pacing device. There is more detail about some of these options below.  4. Are my sleep studies covered by insurance? Although we will request verification of coverage, we advise you also check in advance of the study to ensure there is coverage.    Important tips for those choosing CPAP and similar devices   Know your equipment:  CPAP is continuous positive airway pressure that prevents obstructive sleep apnea by keeping the throat from collapsing while you are sleeping. In most cases, the device is  smart  and can slowly self-adjusts if your throat collapses and keeps a record every day of how well you are treated-this information is available to you and your care team.  BPAP is bilevel positive airway pressure that keeps your throat open and also assists each breath with a pressure boost to maintain adequate breathing.  Special kinds of BPAP are used in patients who have inadequate breathing from lung or heart disease. In most cases, the device is  smart  and can slowly self-adjusts to assist breathing. Like CPAP, the device keeps a record of how well you are treated.  Your mask is your connection to the device. You get to choose what feels most comfortable and the staff will help to make sure if fits. Here: are some examples of the different masks that are available:       Key points to remember on your journey with sleep apnea:  1. Sleep study.  PAP devices often need to be adjusted during a sleep study to show that they are effective and adjusted right.  2. Good tips to remember: Try wearing just the mask during a quiet time during the day so your body adapts to wearing it. A humidifier is recommended for comfort in most cases to prevent drying of your nose and throat. Allergy medication from your provider may help you if you are having nasal congestion.  3. Getting settled-in. It  takes more than one night for most of us to get used to wearing a mask. Try wearing just the mask during a quiet time during the day so your body adapts to wearing it. A humidifier is recommended for comfort in most cases. Our team will work with you carefully on the first day and will be in contact within 4 days and again at 2 and 4 weeks for advice and remote device adjustments. Your therapy is evaluated by the device each day.   4. Use it every night. The more you are able to sleep naturally for 7-8 hours, the more likely you will have good sleep and to prevent health risks or symptoms from sleep apnea. Even if you use it 4 hours it helps. Occasionally all of us are unable to use a medical therapy, in sleep apnea, it is not dangerous to miss one night.   5. Communicate. Call our skilled team on the number provided on the first day if your visit for problems that make it difficult to wear the device. Over 2 out of 3 patients can learn to wear the device long-term with help from our team. Remember to call our team or your sleep providers if you are unable to wear the device as we may have other solutions for those who cannot adapt to mask CPAP therapy. It is recommended that you sleep your sleep provider within the first 3 months and yearly after that if you are not having problems.   6. Use it for your health. We encourage use of CPAP masks during daytime quiet periods to allow your face and brain to adapt to the sensation of CPAP so that it will be a more natural sensation to awaken to at night or during naps. This can be very useful during the first few weeks or months of adapting to CPAP though it does not help medically to wear CPAP during wakefulness and  should not be used as a strategy just to meet guidelines.  7. Take care of your equipment. Make sure you clean your mask and tubing using directions every day and that your filter and mask are replaced as recommended or if they are not working.     BESIDES  CPAP, WHAT OTHER THERAPIES ARE THERE?    Positioning Device  Positioning devices are generally used when sleep apnea is mild and only occurs on your back.This example shows a pillow that straps around the waist. It may be appropriate for those whose sleep study shows milder sleep apnea that occurs primarily when lying flat on one's back. Preliminary studies have shown benefit but effectiveness at home may need to be verified by a home sleep test. These devices are generally not covered by medical insurance.  Examples of devices that maintain sleeping on the back to prevent snoring and mild sleep apnea.    Belt type body positioner  http://[employer].CareToSave/    Electronic reminder  http://nightshifttherapy.com/  http://www.Sofea.CareToSave.au/      Oral Appliance  What is oral appliance therapy?  An oral appliance device fits on your teeth at night like a retainer used after having braces. The device is made by a specialized dentist and requires several visits over 1-2 months before a manufactured device is made to fit your teeth and is adjusted to prevent your sleep apnea. Once an oral device is working properly, snoring should be improved. A home sleep test may be recommended at that time if to determine whether the sleep apnea is adequately treated.       Some things to remember:  -Oral devices are often, but not always, covered by your medical insurance. Be sure to check with your insurance provider.   -If you are referred for oral therapy, you will be given a list of specialized dentists to consider or you may choose to visit the Web site of the American Academy of Dental Sleep Medicine  -Oral devices are less likely to work if you have severe sleep apnea or are extremely overweight.     More detailed information  An oral appliance is a small acrylic device that fits over the upper and lower teeth  (similar to a retainer or a mouth guard). This device slightly moves jaw forward, which moves the base of the tongue forward,  opens the airway, improves breathing for effective treat snoring and obstructive sleep apnea in perhaps 7 out of 10 people .  The best working devices are custom-made by a dental device  after a mold is made of the teeth 1, 2, 3.  When is an oral appliance indicated?  Oral appliance therapy is recommended as a first-line treatment for patients with primary snoring, mild sleep apnea, and for patients with moderate sleep apnea who prefer appliance therapy to use of CPAP4, 5. Severity of sleep apnea is determined by sleep testing and is based on the number of respiratory events per hour of sleep.   How successful is oral appliance therapy?  The success rate of oral appliance therapy in patients with mild sleep apnea is 75-80% while in patients with moderate sleep apnea it is 50-70%. The chance of success in patients with severe sleep apnea is 40-50%. The research also shows that oral appliances have a beneficial effect on the cardiovascular health of GIULIA patients at the same magnitude as CPAP therapy7.  Oral appliances should be a second-line treatment in cases of severe sleep apnea, but if not completely successful then a combination therapy utilizing CPAP plus oral appliance therapy may be effective. Oral appliances tend to be effective in a broad range of patients although studies show that the patients who have the highest success are females, younger patients, those with milder disease, and less severe obesity. 3, 6.   Finding a dentist that practices dental sleep medicine  Specific training is available through the American Academy of Dental Sleep Medicine for dentists interested in working in the field of sleep. To find a dentist who is educated in the field of sleep and the use of oral appliances, near you, visit the Web site of the American Academy of Dental Sleep Medicine.    References  1. Shama et al. Objectively measured vs self-reported compliance during oral appliance therapy for  sleep-disordered breathing. Chest 2013; 144(5): 7761-3457.  2. Zuri, et al. Objective measurement of compliance during oral appliance therapy for sleep-disordered breathing. Thorax 2013; 68(1): 91-96.  3. Maritza et al. Mandibular advancement devices in 620 men and women with GIULIA and snoring: tolerability and predictors of treatment success. Chest 2004; 125: 7054-1865.  4. Johann et al. Oral appliances for snoring and GIULIA: a review. Sleep 2006; 29: 244-262.  5. Rachele et al. Oral appliance treatment for GIULIA: an update. J Clin Sleep Med 2014; 10(2): 215-227.  6. Blake et al. Predictors of OSAH treatment outcome. J Dent Res 2007; 86: 7048-5689.      Weight Loss:    Weight loss is a long-term strategy that may improve sleep apnea in some patients.    Weight management is a personal decision and the decision should be based on your interest and the potential benefits.  If you are interested in exploring weight loss strategies, the following discussion covers the impact on weight loss on sleep apnea and the approaches that may be successful.    Being overweight does not necessarily mean you will have health consequences.  Those who have BMI over 35 or over 27 with existing medical conditions carries greater risk.   Weight loss decreases severity of sleep apnea in most people with obesity. For those with mild obesity who have developed snoring with weight gain, even 15-30 pound weight loss can improve and occasionally eliminate sleep apnea.  Structured and life-long dietary and health habits are necessary to lose weight and keep healthier weight levels.     Though there may be significant health benefits from weight loss, long-term weight loss is very difficult to achieve- studies show success with dietary management in less than 10% of people. In addition, substantial weight loss may require years of dietary control and may be difficult if patients have severe obesity. In these cases, surgical  management may be considered.  Finally, older individuals who have tolerated obesity without health complications may be less likely to benefit from weight loss strategies.        Your BMI is Body mass index is 40.77 kg/m .  Weight management is a personal decision.  If you are interested in exploring weight loss strategies, the following discussion covers the approaches that may be successful. Body mass index (BMI) is one way to tell whether you are at a healthy weight, overweight, or obese. It measures your weight in relation to your height.  A BMI of 18.5 to 24.9 is in the healthy range. A person with a BMI of 25 to 29.9 is considered overweight, and someone with a BMI of 30 or greater is considered obese. More than two-thirds of American adults are considered overweight or obese.  Being overweight or obese increases the risk for further weight gain. Excess weight may lead to heart disease and diabetes.  Creating and following plans for healthy eating and physical activity may help you improve your health.  Weight control is part of healthy lifestyle and includes exercise, emotional health, and healthy eating habits. Careful eating habits lifelong are the mainstay of weight control. Though there are significant health benefits from weight loss, long-term weight loss with diet alone may be very difficult to achieve- studies show long-term success with dietary management in less than 10% of people. Attaining a healthy weight may be especially difficult to achieve in those with severe obesity. In some cases, medications, devices and surgical management might be considered.  What can you do?  If you are overweight or obese and are interested in methods for weight loss, you should discuss this with your provider.     Consider reducing daily calorie intake by 500 calories.     Keep a food journal.     Avoiding skipping meals, consider cutting portions instead.    Diet combined with exercise helps maintain muscle while  optimizing fat loss. Strength training is particularly important for building and maintaining muscle mass. Exercise helps reduce stress, increase energy, and improves fitness. Increasing exercise without diet control, however, may not burn enough calories to loose weight.       Start walking three days a week 10-20 minutes at a time    Work towards walking thirty minutes five days a week     Eventually, increase the speed of your walking for 1-2 minutes at time    In addition, we recommend that you review healthy lifestyles and methods for weight loss available through the National Institutes of Health patient information sites:  http://win.niddk.nih.gov/publications/index.htm    And look into health and wellness programs that may be available through your health insurance provider, employer, local community center, or bishop club.    Weight management plan: Patient was referred to their PCP to discuss a diet and exercise plan.      Surgery:    Surgery for obstructive sleep apnea is considered generally only when other therapies fail to work. Surgery may be discussed with you if you are having a difficult time tolerating CPAP and or when there is an abnormal structure that requires surgical correction.  Nose and throat surgeries often enlarge the airway to prevent collapse.  Most of these surgeries create pain for 1-2 weeks and up to half of the most common surgeries are not effective throughout life.  You should carefully discuss the benefits and drawbacks to surgery with your sleep provider and surgeon to determine if it is the best solution for you.   More information  Surgery for GIULIA is directed at areas that are responsible for narrowing or complete obstruction of the airway during sleep.  There are a wide range of procedures available to enlarge and/or stabilize the airway to prevent blockage of breathing in the three major areas where it can occur: the palate, tongue, and nasal regions.  Successful surgical  treatment depends on the accurate identification of the factors responsible for obstructive sleep apnea in each person.  A personalized approach is required because there is no single treatment that works well for everyone.  Because of anatomic variation, consultation with an examination by a sleep surgeon is a critical first step in determining what surgical options are best for each patient.  In some cases, examination during sedation may be recommended in order to guide the selection of procedures.  Patients will be counseled about risks and benefits as well as the typical recovery course after surgery. Surgery is typically not a cure for a person s GIULIA.  However, surgery will often significantly improve one s GIULIA severity (termed  success rate ).  Even in the absence of a cure, surgery will decrease the cardiovascular risk associated with OSA7; improve overall quality of life8 (sleepiness, functionality, sleep quality, etc).      Palate Procedures:  Patients with GIULIA often have narrowing of their airway in the region of their tonsils and uvula.  The goals of palate procedures are to widen the airway in this region as well as to help the tissues resist collapse.  Modern palate procedure techniques focus on tissue conservation and soft tissue rearrangement, rather than tissue removal.  Often the uvula is preserved in this procedure. Residual sleep apnea is common in patient after pharyngoplasty with an average reduction in sleep apnea events of 33%2.      Tongue Procedures:  ExamWhile patients are awake, the muscles that surround the throat are active and keep this region open for breathing. These muscles relax during sleep, allowing the tongue and other structures to collapse and block breathing.  There are several different tongue procedures available.  Selection of a tongue base procedure depends on characteristics seen on physical exam.  Generally, procedures are aimed at removing bulky tissues in this area or  preventing the back of the tongue from falling back during sleep.  Success rates for tongue surgery range from 50-62%3.    Hypoglossal Nerve Stimulation:  Hypoglossal nerve stimulation has recently received approval from the [place] Food and Drug Administration for the treatment of obstructive sleep apnea.  This is based on research showing that the system was safe and effective in treating sleep apnea6.  Results showed that the median AHI score decreased 68%, from 29.3 to 9.0. This therapy uses an implant system that senses breathing patterns and delivers mild stimulation to airway muscles, which keeps the airway open during sleep.  The system consists of three fully implanted components: a small generator (similar in size to a pacemaker), a breathing sensor, and a stimulation lead.  Using a small handheld remote, a patient turns the therapy on before bed and off upon awakening.    Candidates for this device must be greater than 22 years of age, have moderate to severe GIULIA (AHI between 20-65), BMI less than 32, have tried CPAP/oral appliance without success, and have appropriate upper airway anatomy (determined by a sleep endoscopy performed by Dr. Butterfield).    Hypoglossal Nerve Stimulation Pathway:    The sleep surgeon s office will work with the patient through the insurance prior-authorization process (including communications and appeals).    Nasal Procedures:  Nasal obstruction can interfere with nasal breathing during the day and night.  Studies have shown that relief of nasal obstruction can improve the ability of some patients to tolerate positive airway pressure therapy for obstructive sleep apnea1.  Treatment options include medications such as nasal saline, topical corticosteroid and antihistamine sprays, and oral medications such as antihistamines or decongestants. Non-surgical treatments can include external nasal dilators for selected patients. If these are not successful by themselves, surgery can  improve the nasal airway either alone or in combination with these other options.      Combination Procedures:  Combination of surgical procedures and other treatments may be recommended, particularly if patients have more than one area of narrowing or persistent positional disease.  The success rate of combination surgery ranges from 66-80%2,3.    References  1. Cornelius MCCLAIN. The Role of the Nose in Snoring and Obstructive Sleep Apnoea: An Update.  Eur Arch Otorhinolaryngol. 2011; 268: 1365-73.  2.  Rafita SM; Mel JA; Pardeep JR; Pallanch JF; Javy MB; Ewa SG; Ron MCCLENDON. Surgical modifications of the upper airway for obstructive sleep apnea in adults: a systematic review and meta-analysis. SLEEP 2010;33(10):7191-1493. Ba LOPEZ. Hypopharyngeal surgery in obstructive sleep apnea: an evidence-based medicine review.  Arch Otolaryngol Head Neck Surg. 2006 Feb;132(2):206-13.  3. Michael YH1, Nikki Y, Massimo EMA. The efficacy of anatomically based multilevel surgery for obstructive sleep apnea. Otolaryngol Head Neck Surg. 2003 Oct;129(4):327-35.  4. Ba LOPEZ, Goldberg A. Hypopharyngeal Surgery in Obstructive Sleep Apnea: An Evidence-Based Medicine Review. Arch Otolaryngol Head Neck Surg. 2006 Feb;132(2):206-13.  5. Molly ORELLANA et al. Upper-Airway Stimulation for Obstructive Sleep Apnea.  N Engl J Med. 2014 Jan 9;370(2):139-49.  6. Irma Y et al. Increased Incidence of Cardiovascular Disease in Middle-aged Men with Obstructive Sleep Apnea. Am J Respir Crit Care Med; 2002 166: 159-165  7. Jordan EM et al. Studying Life Effects and Effectiveness of Palatopharyngoplasty (SLEEP) study: Subjective Outcomes of Isolated Uvulopalatopharyngoplasty. Otolaryngol Head Neck Surg. 2011; 144: 623-631.

## 2020-12-14 DIAGNOSIS — I10 BENIGN ESSENTIAL HYPERTENSION: ICD-10-CM

## 2020-12-14 NOTE — TELEPHONE ENCOUNTER
Routing refill request to provider for review/approval because:  Labs out of range:  K  Labs not current:  TSH    VIN EllingtonN, RN  [employer] [facility]

## 2020-12-15 RX ORDER — LOSARTAN POTASSIUM 100 MG/1
100 TABLET ORAL DAILY
Qty: 90 TABLET | Refills: 0 | Status: SHIPPED | OUTPATIENT
Start: 2020-12-15 | End: 2021-02-26

## 2020-12-17 ENCOUNTER — OFFICE VISIT (OUTPATIENT)
Dept: OPHTHALMOLOGY | Facility: CLINIC | Age: 45
End: 2020-12-17
Payer: COMMERCIAL

## 2020-12-17 DIAGNOSIS — H02.132 SENILE ECTROPION OF RIGHT LOWER EYELID: ICD-10-CM

## 2020-12-17 DIAGNOSIS — H02.59 FLOPPY EYELID SYNDROME: Primary | ICD-10-CM

## 2020-12-17 PROCEDURE — 99024 POSTOP FOLLOW-UP VISIT: CPT | Performed by: OPHTHALMOLOGY

## 2020-12-17 ASSESSMENT — VISUAL ACUITY
OD_PH_SC: 20/20
OS_SC: 20/20
METHOD: SNELLEN - LINEAR
OD_SC: 20/30

## 2020-12-17 ASSESSMENT — CONF VISUAL FIELD
OD_NORMAL: 1
METHOD: COUNTING FINGERS
OS_NORMAL: 1

## 2020-12-17 ASSESSMENT — TONOMETRY
OD_IOP_MMHG: 10
IOP_METHOD: ICARE
OS_IOP_MMHG: 10

## 2020-12-17 NOTE — PROGRESS NOTES
Chief Complaint(s) and History of Present Illness(es)     Post Op (Ophthalmology) Right Eye     Laterality: right eye    Onset: sudden    Onset: 2 weeks ago    Course: gradually improving    Associated symptoms: tearing, photophobia and swelling.  Negative for   dryness, eye pain, flashes, floaters, discharge and foreign body sensation      Treatments tried: ointment    Response to treatment: moderate improvement    Pain scale: 0/10    Comments: Right lower eyelid ectropion repair  Right upper eyelid pentagonal wedge  12/3/20.              Comments     Pt states vision is a little better since last visit.  Little bit of   tearing RE.    Jeanine Neville, NERY December 17, 2020 8:49 AM         The lid(s)  is  in excellent position.  A vicryl suture was removed.     I have recommended:  * Continue antibiotic ointment or bland lubricating ointment (eg vaseline or aquaphor) to the incision site at bedtime.   * Warm soaks 3-4x daily until all edema and ecchymoses resolve  * Return to clinic in 2 months     Attending Physician Attestation:  Complete documentation of historical and exam elements from today's encounter can be found in the full encounter summary report (not reduplicated in this progress note).  I personally obtained the chief complaint(s) and history of present illness.  I confirmed and edited as necessary the review of systems, past medical/surgical history, family history, social history, and examination findings as documented by others; and I examined the patient myself.  I personally reviewed the relevant tests, images, and reports as documented above.  I formulated and edited as necessary the assessment and plan and discussed the findings and management plan with the patient and family. - Oanh Donnelly MD

## 2020-12-17 NOTE — NURSING NOTE
Chief Complaints and History of Present Illnesses   Patient presents with     Post Op (Ophthalmology) Right Eye     Right lower eyelid ectropion repair  Right upper eyelid pentagonal wedge  12/3/20.     Chief Complaint(s) and History of Present Illness(es)     Post Op (Ophthalmology) Right Eye     Laterality: right eye    Onset: sudden    Onset: 2 weeks ago    Course: gradually improving    Associated symptoms: tearing, photophobia and swelling.  Negative for dryness, eye pain, flashes, floaters, discharge and foreign body sensation    Treatments tried: ointment    Response to treatment: moderate improvement    Pain scale: 0/10    Comments: Right lower eyelid ectropion repair  Right upper eyelid pentagonal wedge  12/3/20.              Comments     Pt states vision is a little better since last visit.  Little bit of tearing RE.    NERY Trejo December 17, 2020 8:49 AM

## 2020-12-21 ENCOUNTER — TELEPHONE (OUTPATIENT)
Dept: OPHTHALMOLOGY | Facility: CLINIC | Age: 45
End: 2020-12-21

## 2020-12-21 NOTE — TELEPHONE ENCOUNTER
[facility] Call Center    Phone Message    May a detailed message be left on voicemail: yes     Reason for Call: Form or Letter   Type or form/letter needing completion: Needs to know if Pt has any restrictions or limitations and also if there is a return to work date.   Provider: Dr. Oanh Donnelly  Date form needed: As soon as you can  Once completed: Fax form to: [employer] [phone]    If there are any questions, please call Sol or anyone who answers at [employer] insurance [phone] with claim # [id number].     Thank you!      Action Taken: Message routed to:  [facility] E[facility]    Travel Screening: Not Applicable

## 2020-12-23 ENCOUNTER — OFFICE VISIT (OUTPATIENT)
Dept: FAMILY MEDICINE | Facility: CLINIC | Age: 45
End: 2020-12-23
Payer: COMMERCIAL

## 2020-12-23 VITALS
OXYGEN SATURATION: 98 % | WEIGHT: 244 LBS | SYSTOLIC BLOOD PRESSURE: 123 MMHG | BODY MASS INDEX: 40.6 KG/M2 | HEART RATE: 78 BPM | TEMPERATURE: 96.7 F | DIASTOLIC BLOOD PRESSURE: 90 MMHG

## 2020-12-23 DIAGNOSIS — L08.9 INFECTED SEBACEOUS CYST: Primary | ICD-10-CM

## 2020-12-23 DIAGNOSIS — L72.3 INFECTED SEBACEOUS CYST: Primary | ICD-10-CM

## 2020-12-23 PROCEDURE — 99213 OFFICE O/P EST LOW 20 MIN: CPT | Mod: 25 | Performed by: FAMILY MEDICINE

## 2020-12-23 PROCEDURE — 10060 I&D ABSCESS SIMPLE/SINGLE: CPT | Performed by: FAMILY MEDICINE

## 2020-12-23 PROCEDURE — 87070 CULTURE OTHR SPECIMN AEROBIC: CPT | Performed by: FAMILY MEDICINE

## 2020-12-23 RX ORDER — SULFAMETHOXAZOLE/TRIMETHOPRIM 800-160 MG
1 TABLET ORAL 2 TIMES DAILY
Qty: 20 TABLET | Refills: 0 | Status: SHIPPED | OUTPATIENT
Start: 2020-12-23 | End: 2021-01-02

## 2020-12-23 NOTE — PROGRESS NOTES
Subjective     Efrain Lock is a 45 year old male who presents to clinic today for the following health issues:    HPI         Concern - cyst   Onset:  5 months - got red and painful 4-5 days ago.  Description: hard painful cyst on upper left back   Intensity: moderate  Progression of Symptoms:  worsening  Accompanying Signs & Symptoms: red, painful   Previous history of similar problem:   Precipitating factors:        Worsened by:   Alleviating factors:        Improved by:   Therapies tried and outcome:  none         Review of Systems   CONSTITUTIONAL: NEGATIVE for fever, chills      Objective    BP (!) 123/90   Pulse 78   Temp 96.7  F (35.9  C) (Tympanic)   Wt 110.7 kg (244 lb)   SpO2 98%   BMI 40.60 kg/m    Body mass index is 40.6 kg/m .  Physical Exam   GENERAL: healthy, alert and no distress  RESP: lungs clear to auscultation - no rales, rhonchi or wheezes  CV: regular rate and rhythm  SKIN: erythematous subcutaneous lump with tenderness noted on the upper back. No drainage. Fluctuant.             Assessment & Plan     Infected sebaceous cyst  After obtaining verbal consent for I and D, the skin was prep with betadine. 1 % lido with epi used for numbing. A small incision made and pus was expressed out. Cultured obtained. Dressing placed.  Starting patient on bactrim. Apply gentle heat and use ibuprofen. If symptoms are not improving in a few days, notify me back.    - sulfamethoxazole-trimethoprim (BACTRIM DS) 800-160 MG tablet; Take 1 tablet by mouth 2 times daily for 10 days  - Wound Culture Aerobic Bacterial  - Incision and drainage          Return in about 2 months (around 2/26/2021) for Annual Physical Exam.    Henry Cesar MD  [facility]

## 2020-12-25 LAB
BACTERIA SPEC CULT: NORMAL
Lab: NORMAL
SPECIMEN SOURCE: NORMAL

## 2021-02-26 ENCOUNTER — OFFICE VISIT (OUTPATIENT)
Dept: FAMILY MEDICINE | Facility: CLINIC | Age: 46
End: 2021-02-26
Payer: COMMERCIAL

## 2021-02-26 VITALS
HEART RATE: 69 BPM | WEIGHT: 244 LBS | RESPIRATION RATE: 16 BRPM | BODY MASS INDEX: 40.65 KG/M2 | HEIGHT: 65 IN | DIASTOLIC BLOOD PRESSURE: 74 MMHG | OXYGEN SATURATION: 98 % | SYSTOLIC BLOOD PRESSURE: 128 MMHG | TEMPERATURE: 96.7 F

## 2021-02-26 DIAGNOSIS — Z80.42 FAMILY HISTORY OF PROSTATE CANCER: ICD-10-CM

## 2021-02-26 DIAGNOSIS — Z72.0 TOBACCO ABUSE: ICD-10-CM

## 2021-02-26 DIAGNOSIS — R80.9 MICROALBUMINURIA: ICD-10-CM

## 2021-02-26 DIAGNOSIS — I10 BENIGN ESSENTIAL HYPERTENSION: ICD-10-CM

## 2021-02-26 DIAGNOSIS — Z00.00 ANNUAL PHYSICAL EXAM: Primary | ICD-10-CM

## 2021-02-26 DIAGNOSIS — Z11.59 NEED FOR HEPATITIS C SCREENING TEST: ICD-10-CM

## 2021-02-26 LAB — HCV AB SERPL QL IA: NONREACTIVE

## 2021-02-26 PROCEDURE — 80061 LIPID PANEL: CPT | Performed by: FAMILY MEDICINE

## 2021-02-26 PROCEDURE — 36415 COLL VENOUS BLD VENIPUNCTURE: CPT | Performed by: FAMILY MEDICINE

## 2021-02-26 PROCEDURE — 80053 COMPREHEN METABOLIC PANEL: CPT | Performed by: FAMILY MEDICINE

## 2021-02-26 PROCEDURE — 82043 UR ALBUMIN QUANTITATIVE: CPT | Performed by: FAMILY MEDICINE

## 2021-02-26 PROCEDURE — G0103 PSA SCREENING: HCPCS | Performed by: FAMILY MEDICINE

## 2021-02-26 PROCEDURE — 99396 PREV VISIT EST AGE 40-64: CPT | Performed by: FAMILY MEDICINE

## 2021-02-26 PROCEDURE — 99213 OFFICE O/P EST LOW 20 MIN: CPT | Mod: 25 | Performed by: FAMILY MEDICINE

## 2021-02-26 PROCEDURE — 86803 HEPATITIS C AB TEST: CPT | Performed by: FAMILY MEDICINE

## 2021-02-26 RX ORDER — LOSARTAN POTASSIUM 100 MG/1
100 TABLET ORAL DAILY
Qty: 90 TABLET | Refills: 3 | Status: SHIPPED | OUTPATIENT
Start: 2021-02-26 | End: 2021-11-19

## 2021-02-26 RX ORDER — METOPROLOL SUCCINATE 50 MG/1
50 TABLET, EXTENDED RELEASE ORAL DAILY
Qty: 90 TABLET | Refills: 3 | Status: SHIPPED | OUTPATIENT
Start: 2021-02-26 | End: 2021-07-20

## 2021-02-26 RX ORDER — AMLODIPINE BESYLATE 10 MG/1
10 TABLET ORAL DAILY
Qty: 90 TABLET | Refills: 3 | Status: SHIPPED | OUTPATIENT
Start: 2021-02-26 | End: 2021-11-19

## 2021-02-26 RX ORDER — VARENICLINE TARTRATE 1 MG/1
1 TABLET, FILM COATED ORAL 2 TIMES DAILY
Qty: 60 TABLET | Refills: 2 | Status: SHIPPED | OUTPATIENT
Start: 2021-02-26 | End: 2021-07-20

## 2021-02-26 ASSESSMENT — ANXIETY QUESTIONNAIRES
5. BEING SO RESTLESS THAT IT IS HARD TO SIT STILL: NOT AT ALL
GAD7 TOTAL SCORE: 0
3. WORRYING TOO MUCH ABOUT DIFFERENT THINGS: NOT AT ALL
IF YOU CHECKED OFF ANY PROBLEMS ON THIS QUESTIONNAIRE, HOW DIFFICULT HAVE THESE PROBLEMS MADE IT FOR YOU TO DO YOUR WORK, TAKE CARE OF THINGS AT HOME, OR GET ALONG WITH OTHER PEOPLE: NOT DIFFICULT AT ALL
2. NOT BEING ABLE TO STOP OR CONTROL WORRYING: NOT AT ALL
1. FEELING NERVOUS, ANXIOUS, OR ON EDGE: NOT AT ALL
6. BECOMING EASILY ANNOYED OR IRRITABLE: NOT AT ALL
7. FEELING AFRAID AS IF SOMETHING AWFUL MIGHT HAPPEN: NOT AT ALL

## 2021-02-26 ASSESSMENT — PATIENT HEALTH QUESTIONNAIRE - PHQ9
SUM OF ALL RESPONSES TO PHQ QUESTIONS 1-9: 0
5. POOR APPETITE OR OVEREATING: NOT AT ALL

## 2021-02-26 ASSESSMENT — MIFFLIN-ST. JEOR: SCORE: 1908.66

## 2021-02-26 NOTE — PROGRESS NOTES
SUBJECTIVE:   CC: Efrain Lock is an 45 year old male who presents for preventative health visit.     Patient has been advised of split billing requirements and indicates understanding: Yes  Healthy Habits:    Getting at least 3 servings of Calcium per day:  Yes    Bi-annual eye exam:  Yes    Dental care twice a year:  Yes    Sleep apnea or symptoms of sleep apnea:  Sleep apnea and Excessive snoring    Diet:  Regular (no restrictions)    Frequency of exercise:  6-7 days/week    Duration of exercise:  45-60 minutes    Taking medications regularly:  Yes    Barriers to taking medications:  None    Medication side effects:  Other (cough from Lisinopril)    PHQ-2 Total Score:    Additional concerns today:  No      Patient has history of hypertension. He does not check his blood pressure at home. Takes his medication compliantly. Denies any side effects from medication use. In the past is noted to have microscopic albuminuria. He is currently on losartan. He continues to smoke cigarettes. He is interested in quitting. He has used Chantix in the past which helped him but then he restarted. Would like to discontinue smoking again. Also reports a family history of prostate cancer for which she would like to be screened for. Denies any urinary symptoms at this time.    Today's PHQ-2 Score:   PHQ-2 ( 1999 [employer]) 2/26/2020   Q1: Little interest or pleasure in doing things 0   Q2: Feeling down, depressed or hopeless 0   PHQ-2 Score 0   Q1: Little interest or pleasure in doing things Not at all   Q2: Feeling down, depressed or hopeless Not at all   PHQ-2 Score 0       Abuse: Current or Past(Physical, Sexual or Emotional)- No  Do you feel safe in your environment? Yes    Have you ever done Advance Care Planning? (For example, a Health Directive, POLST, or a discussion with a medical provider or your loved ones about your wishes): No, advance care planning information given to patient to review.  Patient declined advance care  planning discussion at this time.    Social History     Tobacco Use     Smoking status: Current Every Day Smoker     Packs/day: 0.30     Types: Cigarettes     Smokeless tobacco: Never Used   Substance Use Topics     Alcohol use: No     Alcohol/week: 0.0 standard drinks     If you drink alcohol do you typically have >3 drinks per day or >7 drinks per week? No    Alcohol Use 2/26/2021   Prescreen: >3 drinks/day or >7 drinks/week? -   Prescreen: >3 drinks/day or >7 drinks/week? No       Last PSA:   PSA   Date Value Ref Range Status   02/26/2021 1.10 0 - 4 ug/L Final     Comment:     Assay Method:  Chemiluminescence using Siemens Vista analyzer       Reviewed orders with patient. Reviewed health maintenance and updated orders accordingly - Yes  Patient Active Problem List   Diagnosis     Tobacco abuse     Essential hypertension, benign     Morbid obesity (H)     Family history of prostate cancer     Microalbuminuria     Hypertriglyceridemia     Left shoulder pain     Floppy eyelid syndrome of right eye     Senile ectropion of right lower eyelid     Mechanical entropion of right eye     Corneal scar     Past Surgical History:   Procedure Laterality Date     REPAIR ECTROPION Right 12/3/2020    Procedure: Right lower eyelid ectropion repair;  Surgeon: Oanh Donnelly MD;  [place] [facility] [place]     REPAIR PTOSIS       SURGICAL HISTORY OF -       rt knee surgery     WEDGE RESECTION EYELID Right 12/3/2020    Procedure: Right upper eyelid pentagonal wedge;  Surgeon: Oanh Donnelly MD;  [place] [facility] [place]       Social History     Tobacco Use     Smoking status: Current Every Day Smoker     Packs/day: 0.30     Types: Cigarettes     Smokeless tobacco: Never Used   Substance Use Topics     Alcohol use: No     Alcohol/week: 0.0 standard drinks     Family History   Problem Relation Age of Onset     Prostate Cancer Father 80     Macular Degeneration Father      Hypertension Paternal Grandmother      Colon Cancer No family hx of       Diabetes No family hx of      Myocardial Infarction No family hx of      Glaucoma No family hx of          Current Outpatient Medications   Medication Sig Dispense Refill     amLODIPine (NORVASC) 10 MG tablet Take 1 tablet (10 mg) by mouth daily 90 tablet 3     losartan (COZAAR) 100 MG tablet Take 1 tablet (100 mg) by mouth daily 90 tablet 3     metoprolol succinate ER (TOPROL-XL) 50 MG 24 hr tablet Take 1 tablet (50 mg) by mouth daily 90 tablet 3     Multiple Vitamins-Minerals (MULTIVITAMIN ADULT PO)        varenicline (CHANTIX DOROTHY) 0.5 MG X 11 & 1 MG X 42 tablet Take 0.5 mg tab daily for 3 days, THEN 0.5 mg tab twice daily for 4 days, THEN 1 mg twice daily. 53 tablet 0     varenicline (CHANTIX) 1 MG tablet Take 1 tablet (1 mg) by mouth 2 times daily 60 tablet 2     Allergies   Allergen Reactions     Lisinopril Cough       Reviewed and updated as needed this visit by clinical staff  Tobacco  Allergies  Meds  Problems  Med Hx  Surg Hx  Fam Hx  Soc Hx          Reviewed and updated as needed this visit by Provider   Allergies   Problems                Review of Systems  CONSTITUTIONAL: NEGATIVE for fever, chills, change in weight  INTEGUMENTARY/SKIN: NEGATIVE for worrisome rashes, moles or lesions  EYES: NEGATIVE for vision changes or irritation  ENT: NEGATIVE for ear, mouth and throat problems  RESP: NEGATIVE for significant cough or SOB  CV: NEGATIVE for chest pain, palpitations or peripheral edema  GI: NEGATIVE for nausea, abdominal pain, heartburn, or change in bowel habits   male: negative for dysuria, hematuria, decreased urinary stream, erectile dysfunction, urethral discharge  MUSCULOSKELETAL: NEGATIVE for significant arthralgias or myalgia  NEURO: NEGATIVE for weakness, dizziness or paresthesias  PSYCHIATRIC: NEGATIVE for changes in mood or affect    OBJECTIVE:   /74 (BP Location: Left arm, Patient Position: Sitting, Cuff Size: Adult Large)   Pulse 69   Temp 96.7  F (35.9  C) (Tympanic)  "  Resp 16   Ht 1.651 m (5' 5\")   Wt 110.7 kg (244 lb)   SpO2 98%   BMI 40.60 kg/m      Physical Exam  GENERAL: healthy, alert and no distress  EYES: Eyes grossly normal to inspection, PERRL and conjunctivae and sclerae normal  HENT: ear canals and TM's normal, nose and mouth without ulcers or lesions  NECK: no adenopathy, no asymmetry, masses, or scars and thyroid normal to palpation  RESP: lungs clear to auscultation - no rales, rhonchi or wheezes  CV: regular rate and rhythm, normal S1 S2, no S3 or S4, no murmur, click or rub, no peripheral edema and peripheral pulses strong  ABDOMEN: soft, nontender, no hepatosplenomegaly, no masses and bowel sounds normal  MS: no gross musculoskeletal defects noted, no edema  SKIN: no suspicious lesions or rashes  NEURO: Normal strength and tone, mentation intact and speech normal  PSYCH: mentation appears normal, affect normal/bright    Diagnostic Test Results:  Labs reviewed in Epic    ASSESSMENT/PLAN:   1. Annual physical exam      2. Need for hepatitis C screening test    - Hepatitis C Screen Reflex to HCV RNA Quant and Genotype    3. Benign essential hypertension  Well controlled  - amLODIPine (NORVASC) 10 MG tablet; Take 1 tablet (10 mg) by mouth daily  Dispense: 90 tablet; Refill: 3  - losartan (COZAAR) 100 MG tablet; Take 1 tablet (100 mg) by mouth daily  Dispense: 90 tablet; Refill: 3  - metoprolol succinate ER (TOPROL-XL) 50 MG 24 hr tablet; Take 1 tablet (50 mg) by mouth daily  Dispense: 90 tablet; Refill: 3  - Lipid panel reflex to direct LDL Fasting  - Comprehensive metabolic panel    4. Microalbuminuria  Patient is currently on losartan. Microalbumin level repeated today  - Albumin Random Urine Quantitative with Creat Ratio    5. Tobacco abuse  Counseled the patient on quitting smoking. Chantix ordered. He has used it in the past and has successfully quit in the past but then restarted. Encouraged him to quit again.  - varenicline (CHANTIX DOROTHY) 0.5 MG X 11 & 1 " "MG X 42 tablet; Take 0.5 mg tab daily for 3 days, THEN 0.5 mg tab twice daily for 4 days, THEN 1 mg twice daily.  Dispense: 53 tablet; Refill: 0  - varenicline (CHANTIX) 1 MG tablet; Take 1 tablet (1 mg) by mouth 2 times daily  Dispense: 60 tablet; Refill: 2    6. Family history of prostate cancer    - Prostate spec antigen screen    Patient has been advised of split billing requirements and indicates understanding:   COUNSELING:   Reviewed preventive health counseling, as reflected in patient instructions       Regular exercise       Healthy diet/nutrition    Estimated body mass index is 40.6 kg/m  as calculated from the following:    Height as of this encounter: 1.651 m (5' 5\").    Weight as of this encounter: 110.7 kg (244 lb).     Weight management plan: Discussed healthy diet and exercise guidelines    He reports that he has been smoking cigarettes. He has been smoking about 0.30 packs per day. He has never used smokeless tobacco.  Tobacco Cessation Action Plan:   Pharmacotherapies : Chantix      Counseling Resources:  ATP IV Guidelines  Pooled Cohorts Equation Calculator  FRAX Risk Assessment  ICSI Preventive Guidelines  Dietary Guidelines for Americans, 2010  USDA's MyPlate  ASA Prophylaxis  Lung CA Screening    Henry Cesar MD  [facility]  "

## 2021-02-27 LAB
ALBUMIN SERPL-MCNC: 4 G/DL (ref 3.4–5)
ALP SERPL-CCNC: 161 U/L (ref 40–150)
ALT SERPL W P-5'-P-CCNC: 54 U/L (ref 0–70)
ANION GAP SERPL CALCULATED.3IONS-SCNC: 8 MMOL/L (ref 3–14)
AST SERPL W P-5'-P-CCNC: 28 U/L (ref 0–45)
BILIRUB SERPL-MCNC: 0.4 MG/DL (ref 0.2–1.3)
BUN SERPL-MCNC: 15 MG/DL (ref 7–30)
CALCIUM SERPL-MCNC: 9 MG/DL (ref 8.5–10.1)
CHLORIDE SERPL-SCNC: 109 MMOL/L (ref 94–109)
CHOLEST SERPL-MCNC: 205 MG/DL
CO2 SERPL-SCNC: 23 MMOL/L (ref 20–32)
CREAT SERPL-MCNC: 0.99 MG/DL (ref 0.66–1.25)
CREAT UR-MCNC: 196 MG/DL
GFR SERPL CREATININE-BSD FRML MDRD: >90 ML/MIN/{1.73_M2}
GLUCOSE SERPL-MCNC: 115 MG/DL (ref 70–99)
HDLC SERPL-MCNC: 34 MG/DL
LDLC SERPL CALC-MCNC: 115 MG/DL
MICROALBUMIN UR-MCNC: 463 MG/L
MICROALBUMIN/CREAT UR: 236.22 MG/G CR (ref 0–17)
NONHDLC SERPL-MCNC: 171 MG/DL
POTASSIUM SERPL-SCNC: 3.4 MMOL/L (ref 3.4–5.3)
PROT SERPL-MCNC: 7.8 G/DL (ref 6.8–8.8)
PSA SERPL-ACNC: 1.1 UG/L (ref 0–4)
SODIUM SERPL-SCNC: 140 MMOL/L (ref 133–144)
TRIGL SERPL-MCNC: 281 MG/DL

## 2021-02-27 ASSESSMENT — ANXIETY QUESTIONNAIRES: GAD7 TOTAL SCORE: 0

## 2021-06-30 ENCOUNTER — OFFICE VISIT (OUTPATIENT)
Dept: FAMILY MEDICINE | Facility: CLINIC | Age: 46
End: 2021-06-30
Payer: COMMERCIAL

## 2021-06-30 VITALS
TEMPERATURE: 96.9 F | SYSTOLIC BLOOD PRESSURE: 150 MMHG | WEIGHT: 244 LBS | BODY MASS INDEX: 40.6 KG/M2 | HEART RATE: 82 BPM | OXYGEN SATURATION: 98 % | DIASTOLIC BLOOD PRESSURE: 80 MMHG

## 2021-06-30 DIAGNOSIS — D36.13 NEUROMA OF FOOT: Primary | ICD-10-CM

## 2021-06-30 PROCEDURE — 99213 OFFICE O/P EST LOW 20 MIN: CPT | Performed by: FAMILY MEDICINE

## 2021-06-30 ASSESSMENT — PAIN SCALES - GENERAL: PAINLEVEL: MILD PAIN (2)

## 2021-06-30 NOTE — PROGRESS NOTES
Assessment & Plan     Neuroma of foot  Referring patient to podiatry for possible excision of neuroma of the foot.  - Orthopedic  Referral; Future      Henry Cesar MD  [facility] DAKOTA Zuniga is a 46 year old who presents for the following health issues     HPI     Warts  Onset/Duration: right foot, middle toe  Description (location/number): 1  Accompanying signs and symptoms (pain, redness): YES- painful to touch  History: prior warts: no  Therapies tried and outcome: none              Review of Systems   CONSTITUTIONAL: NEGATIVE for fever, chills, change in weight      Objective    BP (!) 150/80   Pulse 82   Temp 96.9  F (36.1  C) (Tympanic)   Wt 110.7 kg (244 lb)   SpO2 98%   BMI 40.60 kg/m    Body mass index is 40.6 kg/m .  Physical Exam   GENERAL: healthy, alert and no distress  RESP: No audible wheezing.  MS: Right middle toe has a small mildly tender gross to the lateral side without any fluctuation or erythema.

## 2021-07-01 ENCOUNTER — TELEPHONE (OUTPATIENT)
Dept: SLEEP MEDICINE | Facility: CLINIC | Age: 46
End: 2021-07-01

## 2021-07-01 NOTE — TELEPHONE ENCOUNTER
Reason for call:  Other   Patient called regarding (reason for call): call back  Additional comments: Patient is calling for sleep study scheduling. Patient had consult on 12/9/20    Phone number to reach patient:  Home number on file [phone] (home)    Best Time:  Any time    Can we leave a detailed message on this number?  YES    Travel screening: Not Applicable

## 2021-07-20 ENCOUNTER — OFFICE VISIT (OUTPATIENT)
Dept: FAMILY MEDICINE | Facility: CLINIC | Age: 46
End: 2021-07-20
Payer: COMMERCIAL

## 2021-07-20 VITALS
SYSTOLIC BLOOD PRESSURE: 142 MMHG | OXYGEN SATURATION: 98 % | HEIGHT: 65 IN | BODY MASS INDEX: 41.48 KG/M2 | HEART RATE: 78 BPM | WEIGHT: 249 LBS | DIASTOLIC BLOOD PRESSURE: 84 MMHG | TEMPERATURE: 97.7 F

## 2021-07-20 DIAGNOSIS — Z13.220 LIPID SCREENING: ICD-10-CM

## 2021-07-20 DIAGNOSIS — Z01.818 PREOP GENERAL PHYSICAL EXAM: Primary | ICD-10-CM

## 2021-07-20 DIAGNOSIS — D36.13 NEUROMA OF FOOT: ICD-10-CM

## 2021-07-20 DIAGNOSIS — Z13.1 SCREENING FOR DIABETES MELLITUS: ICD-10-CM

## 2021-07-20 DIAGNOSIS — I10 BENIGN ESSENTIAL HYPERTENSION: ICD-10-CM

## 2021-07-20 PROCEDURE — 99214 OFFICE O/P EST MOD 30 MIN: CPT | Performed by: FAMILY MEDICINE

## 2021-07-20 RX ORDER — METOPROLOL SUCCINATE 50 MG/1
75 TABLET, EXTENDED RELEASE ORAL DAILY
Qty: 135 TABLET | Refills: 0 | Status: SHIPPED | OUTPATIENT
Start: 2021-07-20 | End: 2021-11-19

## 2021-07-20 ASSESSMENT — MIFFLIN-ST. JEOR: SCORE: 1926.34

## 2021-07-20 NOTE — PROGRESS NOTES
[address] [address]  Phone: [phone]  Primary Provider: Henry Washington  Pre-op Performing Provider: HENRY WASHINGTON      PREOPERATIVE EVALUATION:  Today's date: 7/20/2021    Efrain Lock is a 46 year old male who presents for a preoperative evaluation.    Surgical Information:  Surgery/Procedure:  right foot   Surgery Location: [facility]   Surgeon: Dr. Hopkins  Surgery Date: 7/22/2021  Time of Surgery: 8:15  Where patient plans to recover: At home with family  Fax number for surgical facility: [phone]    Type of Anesthesia Anticipated: to be determined    Assessment & Plan     The proposed surgical procedure is considered LOW risk.    Preop general physical exam    Neuroma of foot      Lipid screening    - Lipid panel reflex to direct LDL Fasting; Future  - **Comprehensive metabolic panel FUTURE 2mo; Future    Screening for diabetes mellitus    - **Comprehensive metabolic panel FUTURE 2mo; Future  - **A1C FUTURE 6mo; Future    Benign essential hypertension  Not well controlled.  Increase the dose of metoprolol from 50 to 75 mg daily.  Resume losartan 100 mg daily and amlodipine 10 mg daily.  - metoprolol succinate ER (TOPROL-XL) 50 MG 24 hr tablet; Take 1.5 tablets (75 mg) by mouth daily        Risks and Recommendations:  The patient has the following additional risks and recommendations for perioperative complications:   - No identified additional risk factors other than previously addressed        RECOMMENDATION:  APPROVAL GIVEN to proceed with proposed procedure, without further diagnostic evaluation.                Subjective     HPI related to upcoming procedure:     Preop Questions 7/20/2021   1. Have you ever had a heart attack or stroke? No   2. Have you ever had surgery on your heart or blood vessels, such as a stent placement, a coronary artery bypass, or surgery on an artery in your head, neck, heart, or legs? No   3. Do you  have chest pain with activity? No   4. Do you have a history of  heart failure? No   5. Do you currently have a cold, bronchitis or symptoms of other infection? No   6. Do you have a cough, shortness of breath, or wheezing? No   7. Do you or anyone in your family have previous history of blood clots? No   8. Do you or does anyone in your family have a serious bleeding problem such as prolonged bleeding following surgeries or cuts? No   9. Have you ever had problems with anemia or been told to take iron pills? No   10. Have you had any abnormal blood loss such as black, tarry or bloody stools? No   11. Have you ever had a blood transfusion? No   12. Are you willing to have a blood transfusion if it is medically needed before, during, or after your surgery? Yes   13. Have you or any of your relatives ever had problems with anesthesia? No   14. Do you have sleep apnea, excessive snoring or daytime drowsiness? YES -    14a. Do you have a CPAP machine? No   15. Do you have any artifical heart valves or other implanted medical devices like a pacemaker, defibrillator, or continuous glucose monitor? No   16. Do you have artificial joints? No   17. Are you allergic to latex? No       Health Care Directive:  Patient does not have a Health Care Directive or Living Will: Discussed advance care planning with patient; however, patient declined at this time.    Preoperative Review of :        Status of Chronic Conditions:  HYPERTENSION - Patient has longstanding history of HTN , currently denies any symptoms referable to elevated blood pressure. Specifically denies chest pain, palpitations, dyspnea, orthopnea, PND or peripheral edema. Blood pressure readings have not been in normal range. Current medication regimen is as listed below. Patient denies any side effects of medication.     Patient has history of dyslipidemia.  He was previously prescribed atorvastatin which he never took.    He continues to smoke.  Has not used  Chantix yet.  Smokes about 5 cigarettes/day      Review of Systems  CONSTITUTIONAL: NEGATIVE for fever, chills, change in weight  ENT/MOUTH: NEGATIVE for ear, mouth and throat problems  RESP: NEGATIVE for significant cough or SOB  CV: NEGATIVE for chest pain, palpitations or peripheral edema    Patient Active Problem List    Diagnosis Date Noted     Floppy eyelid syndrome of right eye 10/27/2020     Priority: Medium     Added automatically from request for surgery [id number]       Senile ectropion of right lower eyelid 10/27/2020     Priority: Medium     Added automatically from request for surgery [id number]       Mechanical entropion of right eye 10/27/2020     Priority: Medium     Added automatically from request for surgery [id number]       Corneal scar 10/27/2020     Priority: Medium     Added automatically from request for surgery [id number]       Left shoulder pain 06/26/2018     Priority: Medium     Morbid obesity (H) 06/25/2018     Priority: Medium     Family history of prostate cancer 06/25/2018     Priority: Medium     Microalbuminuria 06/25/2018     Priority: Medium     Hypertriglyceridemia 06/25/2018     Priority: Medium     Tobacco abuse 07/12/2012     Priority: Medium     Essential hypertension, benign 07/12/2012     Priority: Medium      Past Medical History:   Diagnosis Date     HTN (hypertension)      Microalbuminuria 10/30/2013     Tobacco abuse 7/12/2012     Past Surgical History:   Procedure Laterality Date     REPAIR ECTROPION Right 12/3/2020    Procedure: Right lower eyelid ectropion repair;  Surgeon: Oanh Donnelly MD;  [place] [facility] [place]     REPAIR PTOSIS       SURGICAL HISTORY OF -       rt knee surgery     WEDGE RESECTION EYELID Right 12/3/2020    Procedure: Right upper eyelid pentagonal wedge;  Surgeon: Oanh Donnelly MD;  Location: [facility] [place]     Current Outpatient Medications   Medication Sig Dispense Refill     amLODIPine (NORVASC) 10 MG tablet Take 1 tablet (10 mg) by mouth daily 90 tablet 3  "    losartan (COZAAR) 100 MG tablet Take 1 tablet (100 mg) by mouth daily 90 tablet 3     metoprolol succinate ER (TOPROL-XL) 50 MG 24 hr tablet Take 1.5 tablets (75 mg) by mouth daily 135 tablet 0     Multiple Vitamins-Minerals (MULTIVITAMIN ADULT PO)          Allergies   Allergen Reactions     Lisinopril Cough        Social History     Tobacco Use     Smoking status: Current Every Day Smoker     Packs/day: 0.30     Types: Cigarettes     Smokeless tobacco: Never Used   Substance Use Topics     Alcohol use: No     Alcohol/week: 0.0 standard drinks       History   Drug Use No         Objective     BP (!) 142/84   Pulse 78   Temp 97.7  F (36.5  C) (Tympanic)   Ht 1.651 m (5' 5\")   Wt 112.9 kg (249 lb)   SpO2 98%   BMI 41.44 kg/m      Physical Exam  GENERAL APPEARANCE: healthy, alert and no distress  HENT: ear canals and TM's normal and nose and mouth without ulcers or lesions  RESP: lungs clear to auscultation - no rales, rhonchi or wheezes  CV: regular rate and rhythm, normal S1 S2, no S3 or S4 and no murmur, click or rub   ABDOMEN: soft, nontender, no HSM or masses and bowel sounds normal  NEURO: Normal strength and tone, sensory exam grossly normal, mentation intact and speech normal    Recent Labs   Lab Test 02/26/21  1024 02/28/20  0955   HGB  --  15.3    139   POTASSIUM 3.4 3.2*   CR 0.99 1.08        Diagnostics:         Revised Cardiac Risk Index (RCRI):  The patient has the following serious cardiovascular risks for perioperative complications:   - No serious cardiac risks = 0 points     RCRI Interpretation: 0 points: Class I (very low risk - 0.4% complication rate)           Signed Electronically by: Henry Cesar MD  Copy of this evaluation report is provided to requesting physician.      "

## 2021-08-04 ENCOUNTER — LAB (OUTPATIENT)
Dept: LAB | Facility: CLINIC | Age: 46
End: 2021-08-04
Payer: COMMERCIAL

## 2021-08-04 DIAGNOSIS — Z13.220 LIPID SCREENING: ICD-10-CM

## 2021-08-04 DIAGNOSIS — Z13.1 SCREENING FOR DIABETES MELLITUS: ICD-10-CM

## 2021-08-04 LAB — HBA1C MFR BLD: 6.8 % (ref 0–5.6)

## 2021-08-04 PROCEDURE — 80061 LIPID PANEL: CPT

## 2021-08-04 PROCEDURE — 80053 COMPREHEN METABOLIC PANEL: CPT

## 2021-08-04 PROCEDURE — 83721 ASSAY OF BLOOD LIPOPROTEIN: CPT | Mod: XU

## 2021-08-04 PROCEDURE — 36415 COLL VENOUS BLD VENIPUNCTURE: CPT

## 2021-08-04 PROCEDURE — 83036 HEMOGLOBIN GLYCOSYLATED A1C: CPT

## 2021-08-05 LAB
ALBUMIN SERPL-MCNC: 3.8 G/DL (ref 3.4–5)
ALP SERPL-CCNC: 158 U/L (ref 40–150)
ALT SERPL W P-5'-P-CCNC: 48 U/L (ref 0–70)
ANION GAP SERPL CALCULATED.3IONS-SCNC: 9 MMOL/L (ref 3–14)
AST SERPL W P-5'-P-CCNC: 28 U/L (ref 0–45)
BILIRUB SERPL-MCNC: 0.3 MG/DL (ref 0.2–1.3)
BUN SERPL-MCNC: 17 MG/DL (ref 7–30)
CALCIUM SERPL-MCNC: 8.6 MG/DL (ref 8.5–10.1)
CHLORIDE BLD-SCNC: 109 MMOL/L (ref 94–109)
CHOLEST SERPL-MCNC: 202 MG/DL
CO2 SERPL-SCNC: 22 MMOL/L (ref 20–32)
CREAT SERPL-MCNC: 1.19 MG/DL (ref 0.66–1.25)
FASTING STATUS PATIENT QL REPORTED: YES
GFR SERPL CREATININE-BSD FRML MDRD: 72 ML/MIN/1.73M2
GLUCOSE BLD-MCNC: 101 MG/DL (ref 70–99)
HDLC SERPL-MCNC: 35 MG/DL
LDLC SERPL CALC-MCNC: 126 MG/DL
LDLC SERPL CALC-MCNC: ABNORMAL MG/DL
NONHDLC SERPL-MCNC: 167 MG/DL
POTASSIUM BLD-SCNC: 3.3 MMOL/L (ref 3.4–5.3)
PROT SERPL-MCNC: 7.5 G/DL (ref 6.8–8.8)
SODIUM SERPL-SCNC: 140 MMOL/L (ref 133–144)
TRIGL SERPL-MCNC: 409 MG/DL

## 2021-08-23 ENCOUNTER — OFFICE VISIT (OUTPATIENT)
Dept: FAMILY MEDICINE | Facility: CLINIC | Age: 46
End: 2021-08-23
Payer: COMMERCIAL

## 2021-08-23 VITALS
HEART RATE: 87 BPM | HEIGHT: 65 IN | WEIGHT: 250 LBS | DIASTOLIC BLOOD PRESSURE: 76 MMHG | RESPIRATION RATE: 16 BRPM | BODY MASS INDEX: 41.65 KG/M2 | OXYGEN SATURATION: 95 % | TEMPERATURE: 97.6 F | SYSTOLIC BLOOD PRESSURE: 124 MMHG

## 2021-08-23 DIAGNOSIS — E87.6 HYPOKALEMIA: ICD-10-CM

## 2021-08-23 DIAGNOSIS — E11.9 NEW ONSET TYPE 2 DIABETES MELLITUS (H): Primary | ICD-10-CM

## 2021-08-23 DIAGNOSIS — E78.5 HYPERLIPIDEMIA LDL GOAL <100: ICD-10-CM

## 2021-08-23 PROCEDURE — 99214 OFFICE O/P EST MOD 30 MIN: CPT | Performed by: FAMILY MEDICINE

## 2021-08-23 RX ORDER — ATORVASTATIN CALCIUM 20 MG/1
20 TABLET, FILM COATED ORAL DAILY
Qty: 90 TABLET | Refills: 1 | Status: SHIPPED | OUTPATIENT
Start: 2021-08-23 | End: 2021-11-19

## 2021-08-23 RX ORDER — POTASSIUM CHLORIDE 1500 MG/1
20 TABLET, EXTENDED RELEASE ORAL DAILY
Qty: 90 TABLET | Refills: 1 | Status: SHIPPED | OUTPATIENT
Start: 2021-08-23 | End: 2021-11-19

## 2021-08-23 ASSESSMENT — MIFFLIN-ST. JEOR: SCORE: 1930.87

## 2021-08-23 ASSESSMENT — PAIN SCALES - GENERAL: PAINLEVEL: NO PAIN (0)

## 2021-08-23 NOTE — PROGRESS NOTES
"    Assessment & Plan     New onset type 2 diabetes mellitus (H)  Lab Results   Component Value Date    A1C 6.8 08/04/2021   Patient has been diagnosed with type 2 diabetes based on his recent labs.  This was reviewed with him.  He would like to make some lifestyle changes including diet and exercise modifications and give it another 3 months.  If after that no improvement noted, he agrees to starting medication.      Hypokalemia  Patient has had hypokalemia along with muscle cramps off-and-on.  Even though he is on losartan and eating a regular diet, over the last few years I see that his potassium often runs low.  Recommending starting a potassium supplement and rechecking labs with the next blood draw.  Increase hydration  - potassium chloride ER (KLOR-CON M) 20 MEQ CR tablet; Take 1 tablet (20 mEq) by mouth daily    Hyperlipidemia LDL goal <100  His LDL goal is now at less than 100.  Previously he was given Lipitor earlier this year which he never started.  Recommending to start the medication as his LDL is higher than 100.  Patient agrees to that.  Recheck in 3 months recommended  - atorvastatin (LIPITOR) 20 MG tablet; Take 1 tablet (20 mg) by mouth daily      BMI:   Estimated body mass index is 41.6 kg/m  as calculated from the following:    Height as of this encounter: 1.651 m (5' 5\").    Weight as of this encounter: 113.4 kg (250 lb).       Return in about 3 months (around 11/19/2021) for Diabetes Recheck, with Dr. Cesar in office.    Henry Cesar MD  [facility]    Lilibeth Zuniga is a 46 year old who presents for the following health issues     HPI     Patient here to follow-up on newly diagnosed type 2 diabetes.  His last set of labs shows hemoglobin A1c at 6.8.  This was reviewed with him.  Patient has since then lower his carbohydrate intake.  He is try to exercise more by using a sedentary bike but because of his knee pain he is limited.  He is unable to use treadmill or " "walk on a treadmill.  Prior to that he was only using weights to build his upper body.    Complains of cramps in his muscles including hand cramp that he had earlier today.  I discussed his low potassium levels that we have noticed off and on in the past as well as with the last blood work.    Previously started on Lipitor which he never picked up.  Recommending to start statin as his LDL goal is less than 100.  Patient agrees to that.      Review of Systems   CONSTITUTIONAL: NEGATIVE for fever, chills, change in weight  ENT/MOUTH: NEGATIVE for ear, mouth and throat problems  RESP: NEGATIVE for significant cough or SOB  CV: NEGATIVE for chest pain, palpitations or peripheral edema      Objective    /76   Pulse 87   Temp 97.6  F (36.4  C) (Tympanic)   Resp 16   Ht 1.651 m (5' 5\")   Wt 113.4 kg (250 lb)   SpO2 95%   BMI 41.60 kg/m    Body mass index is 41.6 kg/m .  Physical Exam   GENERAL: healthy, alert and no distress  NECK: no adenopathy, no asymmetry, masses, or scars and thyroid normal to palpation  RESP: lungs clear to auscultation - no rales, rhonchi or wheezes  CV: regular rate and rhythm, normal S1 S2, no S3 or S4, no murmur, click or rub, no peripheral edema and peripheral pulses strong  ABDOMEN: soft, nontender, no hepatosplenomegaly, no masses and bowel sounds normal  MS: no gross musculoskeletal defects noted, no edema                "

## 2021-09-09 ENCOUNTER — OFFICE VISIT (OUTPATIENT)
Dept: SLEEP MEDICINE | Facility: CLINIC | Age: 46
End: 2021-09-09
Payer: COMMERCIAL

## 2021-09-09 DIAGNOSIS — R06.81 WITNESSED APNEIC SPELLS: ICD-10-CM

## 2021-09-09 DIAGNOSIS — R29.818 SUSPECTED SLEEP APNEA: ICD-10-CM

## 2021-09-09 DIAGNOSIS — G47.33 OSA (OBSTRUCTIVE SLEEP APNEA): ICD-10-CM

## 2021-09-09 DIAGNOSIS — R06.83 SNORING: Primary | ICD-10-CM

## 2021-09-09 DIAGNOSIS — I10 ESSENTIAL HYPERTENSION: ICD-10-CM

## 2021-09-10 ENCOUNTER — DOCUMENTATION ONLY (OUTPATIENT)
Dept: SLEEP MEDICINE | Facility: CLINIC | Age: 46
End: 2021-09-10
Payer: COMMERCIAL

## 2021-09-10 NOTE — PROGRESS NOTES
HST POST-STUDY QUESTIONNAIRE    1. What time did you go to bed?  100 am  2. How long do you think it took to fall asleep?  30 min  3. What time did you wake up to start the day?  600 am  4. Did you get up during the night at all?  no  5. If you woke up, do you remember approximately what time(s)? n/a  6. Did you have any difficulty with the equipment?  No  7. Did you us any type of treatment with this study?  None  8. Was the head of the bed elevated? Yes  9. Did you sleep in a recliner?  No  10. Did you stop using CPAP at least 3 days before this test?  NA  11. Any other information you'd like us to know? No    This HSAT was performed using a Noxturnal T3 device which recorded snore, sound, movement activity, body position, nasal pressure, oronasal thermal airflow, pulse, oximetry and both chest and abdominal respiratory effort. HSAT data was restricted to the time patient states they were in bed.     HSAT was scored using 1B 4% hypopnea rule.     HST AHI (Non-PAT): 62.2  Snoring was reported as loud.  Time with SpO2 below 89% was 16.8 minutes.   Overall signal quality was good     Pt will follow up with sleep provider to determine appropriate therapy.

## 2021-09-14 PROBLEM — G47.33 OSA (OBSTRUCTIVE SLEEP APNEA): Status: ACTIVE | Noted: 2021-09-14

## 2021-09-15 DIAGNOSIS — R06.81 WITNESSED APNEIC SPELLS: ICD-10-CM

## 2021-09-15 DIAGNOSIS — R29.818 SUSPECTED SLEEP APNEA: ICD-10-CM

## 2021-09-15 DIAGNOSIS — R06.83 SNORING: ICD-10-CM

## 2021-09-15 DIAGNOSIS — I10 ESSENTIAL HYPERTENSION: ICD-10-CM

## 2021-09-15 PROCEDURE — G0399 HOME SLEEP TEST/TYPE 3 PORTA: HCPCS | Mod: 52 | Performed by: INTERNAL MEDICINE

## 2021-09-15 NOTE — PROCEDURES
"HOME SLEEP STUDY INTERPRETATION    Patient: Efrain Lock  MRN: [id number]  YOB: 1975  Study Date: 2021  Referring Provider: Henry Cesar;   Ordering Provider: Tao Prakash MD     Indications for Home Study: Efrain Lock is a 46 year old male with a history of HTN, hyperlipidemia and floppy eyelid syndrome who presents with symptoms suggestive of obstructive sleep apnea.    Estimated body mass index is 41.6 kg/m  as calculated from the following:    Height as of 21: 1.651 m (5' 5\").    Weight as of 21: 113.4 kg (250 lb).  Total score - [place]: 5 (2020 12:00 PM)  STOP-BAN/8    Data: A full night home sleep study was performed recording the standard physiologic parameters including body position, movement, sound, nasal pressure, thermal oral airflow, chest and abdominal movements with respiratory inductance plethysmography, and oxygen saturation by pulse oximetry. Pulse rate was estimated by oximetry recording. This study was considered adequate based on > 4 hours of quality oximetry and respiratory recording. As specified by the AASM Manual for the Scoring of Sleep and Associated events, version 2.3, Rule VIII.D 1B, 4% oxygen desaturation scoring for hypopneas is used as a standard of care on all home sleep apnea testing.    Analysis Time:  317 minutes    Respiration:   Sleep Associated Hypoxemia: sustained hypoxemia was present. Baseline oxygen saturation was 95%.  Time with saturation less than or equal to 88% was 16.8 minutes. The lowest oxygen saturation was 81%.   Snoring: Snoring was present.  Respiratory events: The home study revealed a presence of 202 obstructive apneas and 9 mixed and central apneas. There were 117 hypopneas resulting in a combined apnea/hypopnea index [AHI] of 62.2 events per hour.  AHI was 84.6 per hour supine, - per hour prone, 74 per hour on left side, and 21 per hour on right side.   Pattern: Excluding events noted above, respiratory rate and " pattern was Normal.    Position: Percent of time spent: supine - 54.8%, prone - 0%, on left - 11.7%, on right - 33%.    Heart Rate: By pulse oximetry normal rate was noted.     Assessment:   Severe obstructive sleep apnea.  Sleep associated hypoxemia was present.    Recommendations:  CPAP therapy is recommended for severe obstructive sleep apnea. Consider auto-CPAP at 5-15 cmH2O.  Suggest optimizing sleep hygiene and avoiding sleep deprivation.  Weight management.    Diagnosis Code(s): Obstructive Sleep Apnea G47.33, Hypoxemia G47.36    Tao Prakash MD, September 14, 2021   Diplomate, American Board of Psychiatry and Neurology, Sleep Medicine

## 2021-09-27 DIAGNOSIS — G47.33 OSA (OBSTRUCTIVE SLEEP APNEA): Primary | ICD-10-CM

## 2021-10-02 ENCOUNTER — HEALTH MAINTENANCE LETTER (OUTPATIENT)
Age: 46
End: 2021-10-02

## 2021-10-14 ENCOUNTER — DOCUMENTATION ONLY (OUTPATIENT)
Dept: SLEEP MEDICINE | Facility: CLINIC | Age: 46
End: 2021-10-14
Payer: COMMERCIAL

## 2021-10-14 DIAGNOSIS — G47.33 OSA (OBSTRUCTIVE SLEEP APNEA): ICD-10-CM

## 2021-10-14 NOTE — PROGRESS NOTES
Patient was offered choice of vendor and chose On license of [facility].  Patient Efrain Lock was set up at [facility]  on October 14, 2021. Patient received a Resmed Airsense 11 Pressures were set at 5-15 cm H2O.   Patient s ramp is 5 cm H2O for Auto and FLEX/EPR is 2.  Patient received a Resmed Mask name: AirFit [id number]  Nasal mask size Standard, heated tubing and heated humidifier.  Patient does not need to meet compliance.  Kayla Bojorquez

## 2021-10-18 ENCOUNTER — DOCUMENTATION ONLY (OUTPATIENT)
Dept: SLEEP MEDICINE | Facility: CLINIC | Age: 46
End: 2021-10-18

## 2021-10-18 DIAGNOSIS — G47.33 OSA (OBSTRUCTIVE SLEEP APNEA): ICD-10-CM

## 2021-10-18 NOTE — PROGRESS NOTES
3 day Sleep therapy management telephone visit    Diagnostic AHI:   62.2  HST    Confirmed with patient at time of call- N/A Patient is still interested in STM service       Message left for patient to return call        Objective data     Order Settings for PAP  CPAP min 5    CPAP max 15        Device settings from machine CPAP min 5.0     CPAP max 15.0      EPR Setting TWO    RESMED soft response  OFF     Assessment: Minimal usage nightly      Action plan: Patient to have 14 day STM visit. Patient has a follow up visit scheduled:   no and not required by insurance    Replacement device: No  STM ordered by provider: Yes     Total time spent on accessing and  interpreting remote patient PAP therapy data  10 minutes    Total time spent counseling, coaching  and reviewing PAP therapy data with patient  0 minutes    [id number] no

## 2021-10-18 NOTE — PROGRESS NOTES
Patient returned call.    Subjective measures:   Patient reports that the pressure is too much.    Assessment: Pt not meeting objective benchmarks for compliance  Patient failing following subjective benchmarks: pressure issues    Action plan:pt to have 14 day [facility] visit.  Changed ramp to start at 4.0 cm h20     Total time spent counseling, coaching  and reviewing PAP therapy data with patient  10 minutes     [id number] (this call)    [id number] no (previous call)

## 2021-10-28 ENCOUNTER — DOCUMENTATION ONLY (OUTPATIENT)
Dept: SLEEP MEDICINE | Facility: CLINIC | Age: 46
End: 2021-10-28

## 2021-10-28 DIAGNOSIS — G47.33 OSA (OBSTRUCTIVE SLEEP APNEA): ICD-10-CM

## 2021-11-04 NOTE — PROGRESS NOTES
14  DAY STM VISIT    Diagnostic AHI:   62.2  HST    Message left for patient to return call     Assessment: Pt not meeting objective benchmarks for leak and compliance       Action plan: waiting for patient to return call.  and pt to have 30 day STM visit.      Device type: Auto-CPAP    PAP settings: CPAP min 5.0 cm  H20       CPAP max 15.0 cm  H20         95th% pressure 7.9 cm  H20        RESMED EPR level Setting: TWO    RESMED Soft response setting:  OFF    Mask type:  Nasal Mask    Objective measures: 14 day rolling measures      Compliance  7 %      Leak  41.8  lpm  last  upload      AHI 5.9   last  upload      Average number of minutes 62      Objective measure goal  Compliance   Goal >70%  Leak   Goal < 24 lpm  AHI  Goal < 5  Usage  Goal >240        Total time spent on accessing and interpreting remote patient PAP therapy data  10 minutes    Total time spent counseling, coaching  and reviewing PAP therapy data with patient  0 minutes    [id number]  [id number]  no (3 day STM)      
Patient returned call.    Subjective measures:   Patient reports that he got a full face mask from [employer].  He thought that the mask did not fit, however upon questioning the patient it seems part of his old mask     Assessment: Pt not meeting objective benchmarks for compliance  Patient failing following subjective benchmarks: mask discomfort     Action plan:pt to have 30 day [facility] visit.      Total time spent counseling, coaching  and reviewing PAP therapy data with patient  4 minutes     [id number] (this call)    [id number] no (previous call)    
long term intact/short term intact

## 2021-11-16 ENCOUNTER — DOCUMENTATION ONLY (OUTPATIENT)
Dept: SLEEP MEDICINE | Facility: CLINIC | Age: 46
End: 2021-11-16
Payer: COMMERCIAL

## 2021-11-16 DIAGNOSIS — G47.33 OSA (OBSTRUCTIVE SLEEP APNEA): ICD-10-CM

## 2021-11-16 NOTE — PROGRESS NOTES
30 DAY STM VISIT    Diagnostic AHI:  62.2 HST    Subjective measures:   Patient feels like he needs a new mask.  The mask he bought at [employer] he feels is to small.  Patient going [facility] replacement [facility].    Assessment: Pt not meeting objective benchmarks for compliance  Patient failing following subjective benchmarks: mask discomfort   Action plan: pt to have 6 month STM visit  Patient has not scheduled a follow up visit.   Device type: Auto-CPAP  PAP settings: CPAP min 5.0 cm  H20     CPAP max 15.0 cm  H20    95th% pressure 11.3 cm  H20      RESMED EPR level Setting: TWO    RESMED Soft response setting:  OFF  Mask type:  Nasal Mask  Objective measures: 14 day rolling measures      Compliance  0 %      Leak  40.8 lpm  last  upload      AHI 3.26   last  upload      Average number of minutes 64      Objective measure goal  Compliance   Goal >70%  Leak   Goal < 24 lpm  AHI  Goal < 5  Usage  Goal >240        Total time spent on accessing and interpreting remote patient PAP therapy data  10 minutes    Total time spent counseling, coaching  and reviewing PAP therapy data with patient  5 minutes     [id number] this call  [id number] no  at 3 or 14 day [facility]

## 2021-11-19 ENCOUNTER — OFFICE VISIT (OUTPATIENT)
Dept: FAMILY MEDICINE | Facility: CLINIC | Age: 46
End: 2021-11-19
Payer: COMMERCIAL

## 2021-11-19 VITALS
HEIGHT: 65 IN | WEIGHT: 246 LBS | RESPIRATION RATE: 18 BRPM | DIASTOLIC BLOOD PRESSURE: 74 MMHG | TEMPERATURE: 97.3 F | BODY MASS INDEX: 40.98 KG/M2 | SYSTOLIC BLOOD PRESSURE: 128 MMHG | HEART RATE: 71 BPM | OXYGEN SATURATION: 98 %

## 2021-11-19 DIAGNOSIS — E11.9 NEW ONSET TYPE 2 DIABETES MELLITUS (H): ICD-10-CM

## 2021-11-19 DIAGNOSIS — E78.5 HYPERLIPIDEMIA LDL GOAL <100: Primary | ICD-10-CM

## 2021-11-19 DIAGNOSIS — I10 BENIGN ESSENTIAL HYPERTENSION: ICD-10-CM

## 2021-11-19 LAB — HBA1C MFR BLD: 7.3 % (ref 0–5.6)

## 2021-11-19 PROCEDURE — 83036 HEMOGLOBIN GLYCOSYLATED A1C: CPT | Performed by: FAMILY MEDICINE

## 2021-11-19 PROCEDURE — 36415 COLL VENOUS BLD VENIPUNCTURE: CPT | Performed by: FAMILY MEDICINE

## 2021-11-19 PROCEDURE — 99214 OFFICE O/P EST MOD 30 MIN: CPT | Performed by: FAMILY MEDICINE

## 2021-11-19 PROCEDURE — 82043 UR ALBUMIN QUANTITATIVE: CPT | Performed by: FAMILY MEDICINE

## 2021-11-19 PROCEDURE — 80053 COMPREHEN METABOLIC PANEL: CPT | Performed by: FAMILY MEDICINE

## 2021-11-19 RX ORDER — METFORMIN HCL 500 MG
500 TABLET, EXTENDED RELEASE 24 HR ORAL
Qty: 90 TABLET | Refills: 1 | Status: SHIPPED | OUTPATIENT
Start: 2021-11-19 | End: 2022-10-28

## 2021-11-19 RX ORDER — ATORVASTATIN CALCIUM 20 MG/1
20 TABLET, FILM COATED ORAL DAILY
Qty: 90 TABLET | Refills: 1 | Status: SHIPPED | OUTPATIENT
Start: 2021-11-19 | End: 2022-10-28

## 2021-11-19 RX ORDER — LOSARTAN POTASSIUM 100 MG/1
100 TABLET ORAL DAILY
Qty: 90 TABLET | Refills: 3 | Status: SHIPPED | OUTPATIENT
Start: 2021-11-19 | End: 2022-10-28

## 2021-11-19 RX ORDER — METOPROLOL SUCCINATE 50 MG/1
75 TABLET, EXTENDED RELEASE ORAL DAILY
Qty: 135 TABLET | Refills: 3 | Status: SHIPPED | OUTPATIENT
Start: 2021-11-19 | End: 2022-10-28

## 2021-11-19 RX ORDER — AMLODIPINE BESYLATE 10 MG/1
10 TABLET ORAL DAILY
Qty: 90 TABLET | Refills: 3 | Status: SHIPPED | OUTPATIENT
Start: 2021-11-19 | End: 2022-10-28

## 2021-11-19 ASSESSMENT — MIFFLIN-ST. JEOR: SCORE: 1912.73

## 2021-11-19 NOTE — PROGRESS NOTES
"  Assessment & Plan     Hyperlipidemia LDL goal <100  Patient tells that he never got the prescription of atorvastatin.  I have emphasized the importance of starting statins immediately.  Medication reordered.  Recommending to start the medication immediately.  Patient verbalized agreement  - atorvastatin (LIPITOR) 20 MG tablet; Take 1 tablet (20 mg) by mouth daily  - Comprehensive metabolic panel; Future    Benign essential hypertension  Well-controlled  Patient stopped taking potassium supplement.  Repeat labs ordered to see potassium level  - metoprolol succinate ER (TOPROL-XL) 50 MG 24 hr tablet; Take 1.5 tablets (75 mg) by mouth daily  - losartan (COZAAR) 100 MG tablet; Take 1 tablet (100 mg) by mouth daily  - amLODIPine (NORVASC) 10 MG tablet; Take 1 tablet (10 mg) by mouth daily  - Comprehensive metabolic panel; Future    New onset type 2 diabetes mellitus (H)  Recommending to get his diabetic eye exam done.  Labs ordered to investigate the control at this time.  - Hemoglobin A1c; Future  - Albumin Random Urine Quantitative with Creat Ratio; Future  - Adult Eye Referral; Future         BMI:   Estimated body mass index is 40.94 kg/m  as calculated from the following:    Height as of this encounter: 1.651 m (5' 5\").    Weight as of this encounter: 111.6 kg (246 lb).           Return in about 6 months (around 5/19/2022) for Diabetes Recheck.    Henry Cesar MD  [facility] DAKOTA Zuniga is a 46 year old who presents for the following health issues     HPI     Diabetes Follow-up      How often are you checking your blood sugar? Not at all    What concerns do you have today about your diabetes? None     Do you have any of these symptoms? (Select all that apply)  No numbness or tingling in feet.  No redness, sores or blisters on feet.  No complaints of excessive thirst.  No reports of blurry vision.  No significant changes to weight.    Have you had a diabetic eye exam in the last " "12 months? No        BP Readings from Last 2 Encounters:   11/19/21 128/74   08/23/21 124/76     Hemoglobin A1C (%)   Date Value   08/04/2021 6.8 (H)   08/17/2017 6.0   04/11/2017 6.0     LDL Cholesterol Calculated   Date Value   08/04/2021      Comment:     Cannot estimate LDL when triglyceride exceeds 400 mg/dL  Age 0-19 years:  Desirable: 0-110 mg/dL   Borderline high: 110-129 mg/dL   High: >= 130 mg/dL    Age 20 years and older:  Desirable: <100mg/dL  Above desirable: 100-129 mg/dL   Borderline high: 130-159 mg/dL   High: 160-189 mg/dL   Very high: >= 190 mg/dL   02/26/2021 115 mg/dL (H)   02/28/2020 126 mg/dL (H)     LDL Cholesterol Direct (mg/dL)   Date Value   08/04/2021 126 (H)           How many servings of fruits and vegetables do you eat daily?  2-3    On average, how many sweetened beverages do you drink each day (Examples: soda, juice, sweet tea, etc.  Do NOT count diet or artificially sweetened beverages)?   0    How many days per week do you exercise enough to make your heart beat faster? 5    How many minutes a day do you exercise enough to make your heart beat faster? 30 - 60    How many days per week do you miss taking your medication? 0        Review of Systems   CONSTITUTIONAL: NEGATIVE for fever, chills, change in weight  ENT/MOUTH: NEGATIVE for ear, mouth and throat problems  RESP: NEGATIVE for significant cough or SOB  CV: NEGATIVE for chest pain, palpitations or peripheral edema      Objective    /74   Pulse 71   Temp 97.3  F (36.3  C) (Tympanic)   Resp 18   Ht 1.651 m (5' 5\")   Wt 111.6 kg (246 lb)   SpO2 98%   BMI 40.94 kg/m    Body mass index is 40.94 kg/m .  Physical Exam   GENERAL: healthy, alert and no distress  NECK: no adenopathy, no asymmetry, masses, or scars and thyroid normal to palpation  RESP: lungs clear to auscultation - no rales, rhonchi or wheezes  CV: regular rate and rhythm, normal S1 S2, no S3 or S4, no murmur, click or rub, no peripheral edema and peripheral " pulses strong  ABDOMEN: soft, nontender, no hepatosplenomegaly, no masses and bowel sounds normal  MS: no gross musculoskeletal defects noted, no edema

## 2021-11-20 LAB
ALBUMIN SERPL-MCNC: 3.7 G/DL (ref 3.4–5)
ALP SERPL-CCNC: 164 U/L (ref 40–150)
ALT SERPL W P-5'-P-CCNC: 60 U/L (ref 0–70)
ANION GAP SERPL CALCULATED.3IONS-SCNC: 9 MMOL/L (ref 3–14)
AST SERPL W P-5'-P-CCNC: 28 U/L (ref 0–45)
BILIRUB SERPL-MCNC: 0.5 MG/DL (ref 0.2–1.3)
BUN SERPL-MCNC: 14 MG/DL (ref 7–30)
CALCIUM SERPL-MCNC: 9 MG/DL (ref 8.5–10.1)
CHLORIDE BLD-SCNC: 108 MMOL/L (ref 94–109)
CO2 SERPL-SCNC: 23 MMOL/L (ref 20–32)
CREAT SERPL-MCNC: 1.04 MG/DL (ref 0.66–1.25)
CREAT UR-MCNC: 181 MG/DL
GFR SERPL CREATININE-BSD FRML MDRD: 85 ML/MIN/1.73M2
GLUCOSE BLD-MCNC: 125 MG/DL (ref 70–99)
MICROALBUMIN UR-MCNC: 1050 MG/L
MICROALBUMIN/CREAT UR: 580.11 MG/G CR (ref 0–17)
POTASSIUM BLD-SCNC: 3.4 MMOL/L (ref 3.4–5.3)
PROT SERPL-MCNC: 7.6 G/DL (ref 6.8–8.8)
SODIUM SERPL-SCNC: 140 MMOL/L (ref 133–144)

## 2022-03-04 ENCOUNTER — OFFICE VISIT (OUTPATIENT)
Dept: FAMILY MEDICINE | Facility: CLINIC | Age: 47
End: 2022-03-04
Payer: COMMERCIAL

## 2022-03-04 VITALS — DIASTOLIC BLOOD PRESSURE: 78 MMHG | SYSTOLIC BLOOD PRESSURE: 138 MMHG

## 2022-03-04 DIAGNOSIS — L57.0 AK (ACTINIC KERATOSIS): ICD-10-CM

## 2022-03-04 DIAGNOSIS — L30.9 DERMATITIS: Primary | ICD-10-CM

## 2022-03-04 PROCEDURE — 17000 DESTRUCT PREMALG LESION: CPT | Performed by: DERMATOLOGY

## 2022-03-04 PROCEDURE — 17003 DESTRUCT PREMALG LES 2-14: CPT | Performed by: DERMATOLOGY

## 2022-03-04 PROCEDURE — 99203 OFFICE O/P NEW LOW 30 MIN: CPT | Mod: 25 | Performed by: DERMATOLOGY

## 2022-03-04 RX ORDER — HYDROCORTISONE 25 MG/G
OINTMENT TOPICAL 2 TIMES DAILY
Qty: 30 G | Refills: 1 | Status: SHIPPED | OUTPATIENT
Start: 2022-03-04 | End: 2023-03-23

## 2022-03-04 NOTE — PROGRESS NOTES
"[facility] Dermatology Clinic Note - EP    Encounter Date: Mar 4, 2022    Dermatology Problem List:  1. AKs, scalp x4 s/p cryo 3/4/2022   2. Dermatitis, cheeks  - hydrocortisone  ____________________________________________    Assessment & Plan:     # Actinic keratoses/porokeratoses, scalp x4.  - Cryotherapy performed today, see note below.     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  - Recommend Allegra or Zyrtec OTC.    # Dermatitis, bilateral cheeks.  Discussed that this could be secondary to mask wearing or other irritation, reassured patient that this is unrelated to AKs on the scalp.   - Start hydrocortisone 2.5% ointment bid prn.    Procedures Performed:   - Cryotherapy procedure note, location(s): see above. After verbal consent and discussion of risks and benefits including, but not limited to, dyspigmentation/scar, blister, and pain, 4 AKs treated with 1-2 mm freeze border for 1-2 cycles with liquid nitrogen. Post cryotherapy instructions were provided.    Follow-up: 3 months    Scribe Disclosure:  I, Heidy Zhang, am serving as a scribe to document services personally performed by Syed Rosen MD based on data collection and the provider's statements to me.     Staff attestation:  The documentation recorded by the scribe accurately reflects the services I personally performed and the decisions I personally made. I have made edits where needed.    Syed Rosen MD  Staff Dermatologist and Dermatopathologist  , Department of Dermatology  ____________________________________________    CC: Derm Problem (white spots on head)      HPI:  Mr. Efrain Lock is a(n) extremely pleasant 46 year old male who presents today as a new patient for a spot check.    Today, the patient makes note of a few pink/white patches on the scalp and face which come and go over " time. He denies associated itch. He thought it could be a fungus and treated with OTC fungal treatments, however this did not have any significant impact. Patient also reports some darkening pigment around his eyes which he states another provider has reassured him is benign.    Patient is otherwise feeling well, without additional skin concerns.     Labs Reviewed:  N/A    Physical Exam:  Vitals: /78   SKIN: Focused examination of the face and scalp was performed.  - 4 pink scaly macules on the scalp.  - No other lesions of concern on areas examined.     Medications:  Current Outpatient Medications   Medication     amLODIPine (NORVASC) 10 MG tablet     atorvastatin (LIPITOR) 20 MG tablet     losartan (COZAAR) 100 MG tablet     metoprolol succinate ER (TOPROL-XL) 50 MG 24 hr tablet     Multiple Vitamins-Minerals (MULTIVITAMIN ADULT PO)     metFORMIN (GLUCOPHAGE-XR) 500 MG 24 hr tablet     No current facility-administered medications for this visit.      Past Medical History:   Patient Active Problem List   Diagnosis     Tobacco abuse     Essential hypertension, benign     Morbid obesity (H)     Family history of prostate cancer     Microalbuminuria     Hyperlipidemia LDL goal <100     Left shoulder pain     Floppy eyelid syndrome of right eye     Senile ectropion of right lower eyelid     Mechanical entropion of right eye     Corneal scar     New onset type 2 diabetes mellitus (H)     Hypokalemia     GIULIA (obstructive sleep apnea)     Past Medical History:   Diagnosis Date     HTN (hypertension)      Microalbuminuria 10/30/2013     Tobacco abuse 7/12/2012       CC Referred Self, MD  No address on file on close of this encounter.    This note has been created using voice recognition software; while it has been reviewed, some errors may persist.

## 2022-03-04 NOTE — NURSING NOTE
AMANDO form filled out by pt and faxed over to [facility] [phone].    Roberta Fong MA

## 2022-03-04 NOTE — LETTER
"    3/4/2022         RE: Efrain Lock  [address]  [place] [address]        Dear Colleague,    Thank you for referring your patient, Efrain Lock, to the [facility]. Please see a copy of my visit note below.    [facility] Dermatology Clinic Note - EP    Encounter Date: Mar 4, 2022    Dermatology Problem List:  1. AKs, scalp x4 s/p cryo 3/4/2022   2. Dermatitis, cheeks  - hydrocortisone  ____________________________________________    Assessment & Plan:     # Actinic keratoses/porokeratoses, scalp x4.  - Cryotherapy performed today, see note below.     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  - Recommend Allegra or Zyrtec OTC.    # Dermatitis, bilateral cheeks.  Discussed that this could be secondary to mask wearing or other irritation, reassured patient that this is unrelated to AKs on the scalp.   - Start hydrocortisone 2.5% ointment bid prn.    Procedures Performed:   - Cryotherapy procedure note, location(s): see above. After verbal consent and discussion of risks and benefits including, but not limited to, dyspigmentation/scar, blister, and pain, 4 AKs treated with 1-2 mm freeze border for 1-2 cycles with liquid nitrogen. Post cryotherapy instructions were provided.    Follow-up: 3 months    Scribe Disclosure:  I, Heidy Zhang, am serving as a scribe to document services personally performed by Syed Rosen MD based on data collection and the provider's statements to me.     Staff attestation:  The documentation recorded by the scribe accurately reflects the services I personally performed and the decisions I personally made. I have made edits where needed.    Syed Rosen MD  Staff Dermatologist and Dermatopathologist  , Department of Dermatology  ____________________________________________    CC: Derm Problem " (white spots on head)      HPI:  Mr. Efrain Lock is a(n) extremely pleasant 46 year old male who presents today as a new patient for a spot check.    Today, the patient makes note of a few pink/white patches on the scalp and face which come and go over time. He denies associated itch. He thought it could be a fungus and treated with OTC fungal treatments, however this did not have any significant impact. Patient also reports some darkening pigment around his eyes which he states another provider has reassured him is benign.    Patient is otherwise feeling well, without additional skin concerns.     Labs Reviewed:  N/A    Physical Exam:  Vitals: /78   SKIN: Focused examination of the face and scalp was performed.  - 4 pink scaly macules on the scalp.  - No other lesions of concern on areas examined.     Medications:  Current Outpatient Medications   Medication     amLODIPine (NORVASC) 10 MG tablet     atorvastatin (LIPITOR) 20 MG tablet     losartan (COZAAR) 100 MG tablet     metoprolol succinate ER (TOPROL-XL) 50 MG 24 hr tablet     Multiple Vitamins-Minerals (MULTIVITAMIN ADULT PO)     metFORMIN (GLUCOPHAGE-XR) 500 MG 24 hr tablet     No current facility-administered medications for this visit.      Past Medical History:   Patient Active Problem List   Diagnosis     Tobacco abuse     Essential hypertension, benign     Morbid obesity (H)     Family history of prostate cancer     Microalbuminuria     Hyperlipidemia LDL goal <100     Left shoulder pain     Floppy eyelid syndrome of right eye     Senile ectropion of right lower eyelid     Mechanical entropion of right eye     Corneal scar     New onset type 2 diabetes mellitus (H)     Hypokalemia     GIULIA (obstructive sleep apnea)     Past Medical History:   Diagnosis Date     HTN (hypertension)      Microalbuminuria 10/30/2013     Tobacco abuse 7/12/2012       CC Referred Self, MD  No address on file on close of this encounter.    This note has been created  using voice recognition software; while it has been reviewed, some errors may persist.       Again, thank you for allowing me to participate in the care of your patient.        Sincerely,        Syed Rosen MD

## 2022-03-19 ENCOUNTER — HEALTH MAINTENANCE LETTER (OUTPATIENT)
Age: 47
End: 2022-03-19

## 2022-05-03 ENCOUNTER — DOCUMENTATION ONLY (OUTPATIENT)
Dept: SLEEP MEDICINE | Facility: CLINIC | Age: 47
End: 2022-05-03
Payer: COMMERCIAL

## 2022-05-03 DIAGNOSIS — G47.33 OSA (OBSTRUCTIVE SLEEP APNEA): Primary | ICD-10-CM

## 2022-05-12 ENCOUNTER — DOCUMENTATION ONLY (OUTPATIENT)
Dept: SLEEP MEDICINE | Facility: CLINIC | Age: 47
End: 2022-05-12
Payer: COMMERCIAL

## 2022-05-12 DIAGNOSIS — G47.33 OSA (OBSTRUCTIVE SLEEP APNEA): Primary | ICD-10-CM

## 2022-05-12 NOTE — PROGRESS NOTES
[facility]  for mask fitting appointment on May 12, 2022. Patient requested to switch masks because oral breathing. Discussed the following masks: Airfit F30i, Airfit F20. Patient selected a Resmed Mask name: Airfit F30i Full Face mask size Wide    **Of note- patient complained of pressures on machine making it hard to breathe out, and sometimes feeling too high. I turned EPR to 3, patient also wanted to schedule sleep follow up to discuss pressures with doctor. Scheduled for 7/19/2022 with Dr. Prakash.**

## 2022-05-14 ENCOUNTER — HEALTH MAINTENANCE LETTER (OUTPATIENT)
Age: 47
End: 2022-05-14

## 2022-07-09 ENCOUNTER — HEALTH MAINTENANCE LETTER (OUTPATIENT)
Age: 47
End: 2022-07-09

## 2022-09-03 ENCOUNTER — HEALTH MAINTENANCE LETTER (OUTPATIENT)
Age: 47
End: 2022-09-03

## 2022-10-12 ENCOUNTER — VIRTUAL VISIT (OUTPATIENT)
Dept: FAMILY MEDICINE | Facility: CLINIC | Age: 47
End: 2022-10-12
Payer: COMMERCIAL

## 2022-10-12 DIAGNOSIS — R25.2 CRAMP OF MUSCLE OF LEFT UPPER EXTREMITY: Primary | ICD-10-CM

## 2022-10-12 DIAGNOSIS — E66.01 MORBID OBESITY (H): ICD-10-CM

## 2022-10-12 DIAGNOSIS — E11.9 NEW ONSET TYPE 2 DIABETES MELLITUS (H): ICD-10-CM

## 2022-10-12 DIAGNOSIS — E87.6 HYPOKALEMIA: ICD-10-CM

## 2022-10-12 PROCEDURE — 99214 OFFICE O/P EST MOD 30 MIN: CPT | Mod: 95 | Performed by: FAMILY MEDICINE

## 2022-10-12 NOTE — PROGRESS NOTES
"Efrain is a 47 year old who is being evaluated via a billable video visit.      How would you like to obtain your AVS? MyChart  If the video visit is dropped, the invitation should be resent by: Text to cell phone: [phone]  Will anyone else be joining your video visit? No          Assessment & Plan     Cramp of muscle of left upper extremity  Suggested patient to get some blood work to make sure there is no electrolyte abnormality we will get the lab work done once done we will follow-up on that.  If labs are stable I would suggest continue hydration and avoid activities which aggravate.  - Comprehensive metabolic panel (BMP + Alb, Alk Phos, ALT, AST, Total. Bili, TP); Future  - Hemoglobin A1c; Future  - UA with Microscopic reflex to Culture - lab collect; Future  - CK total; Future    New onset type 2 diabetes mellitus (H)  Diabetic control is not known his last A1c was above 7.  He has not started any medication he thinks it was not very high.  However I told him we will check it again if numbers are similar or worse he probably needs some medication including metformin as long as his kidney functions are okay.  - Comprehensive metabolic panel (BMP + Alb, Alk Phos, ALT, AST, Total. Bili, TP); Future  - Hemoglobin A1c; Future  - UA with Microscopic reflex to Culture - lab collect; Future  - Albumin Random Urine Quantitative with Creat Ratio; Future    Hypokalemia  Electrolyte imbalance can cause some muscle cramps suggested getting some blood work done once done we will make some recommendation.  - Comprehensive metabolic panel (BMP + Alb, Alk Phos, ALT, AST, Total. Bili, TP); Future    Morbid obesity (H)          BMI:   Estimated body mass index is 40.94 kg/m  as calculated from the following:    Height as of 11/19/21: 1.651 m (5' 5\").    Weight as of 11/19/21: 111.6 kg (246 lb).           Return in about 2 weeks (around 10/26/2022) for if symptom does not get better.    Fabrizio Quach MD  [facility] [place] is a 47 year old, presenting for the following health issues:  No chief complaint on file.      HPI     Concern - Both Arms Cramping  Onset: 3 Weeks  Description: Both arms cramp up and feeling of cramping  Intensity: mild, moderate  Progression of Symptoms:  intermittent  Accompanying Signs & Symptoms:   Previous history of similar problem:   Precipitating factors:        Worsened by:   Alleviating factors:        Improved by:   Therapies tried and outcome:   Patient has history of high blood pressure along with diabetes.  He is not taking any diabetic medication recently he noticed some cramping and discomfort in the anterior aspect of the of both bilateral forearms.  He feels like some sensation in the for the cramps.  In the past he has slightly low potassium however he is not taking any supplements.  He does workout most of the days.  He would like to get some blood work done to make sure his labs and electrolytes are stable.      Review of Systems   Constitutional, HEENT, cardiovascular, pulmonary, gi and gu systems are negative, except as otherwise noted.      Objective           Vitals:  No vitals were obtained today due to virtual visit.    Physical Exam   GENERAL: Healthy, alert and no distress  EYES: Eyes grossly normal to inspection.  No discharge or erythema, or obvious scleral/conjunctival abnormalities.  RESP: No audible wheeze, cough, or visible cyanosis.  No visible retractions or increased work of breathing.    SKIN: Visible skin clear. No significant rash, abnormal pigmentation or lesions.  NEURO: Cranial nerves grossly intact.  Mentation and speech appropriate for age.  PSYCH: Mentation appears normal, affect normal/bright, judgement and insight intact, normal speech and appearance well-groomed.            Video-Visit Details    Video Start Time: 10:15    Type of service:  Video Visit    Video End Time:10:28 AM    Originating Location (pt. Location):  Home    Distant Location (provider location):  [facility]     Platform used for Video Visit: Farshad

## 2022-10-19 ENCOUNTER — LAB (OUTPATIENT)
Dept: LAB | Facility: CLINIC | Age: 47
End: 2022-10-19
Payer: COMMERCIAL

## 2022-10-19 DIAGNOSIS — E87.6 HYPOKALEMIA: ICD-10-CM

## 2022-10-19 DIAGNOSIS — E11.9 NEW ONSET TYPE 2 DIABETES MELLITUS (H): ICD-10-CM

## 2022-10-19 DIAGNOSIS — R25.2 CRAMP OF MUSCLE OF LEFT UPPER EXTREMITY: ICD-10-CM

## 2022-10-19 LAB
ALBUMIN SERPL-MCNC: 3.6 G/DL (ref 3.4–5)
ALBUMIN UR-MCNC: >=300 MG/DL
ALP SERPL-CCNC: 188 U/L (ref 40–150)
ALT SERPL W P-5'-P-CCNC: 59 U/L (ref 0–70)
ANION GAP SERPL CALCULATED.3IONS-SCNC: 5 MMOL/L (ref 3–14)
APPEARANCE UR: CLEAR
AST SERPL W P-5'-P-CCNC: 32 U/L (ref 0–45)
BACTERIA #/AREA URNS HPF: ABNORMAL /HPF
BILIRUB SERPL-MCNC: 0.5 MG/DL (ref 0.2–1.3)
BILIRUB UR QL STRIP: NEGATIVE
BUN SERPL-MCNC: 14 MG/DL (ref 7–30)
CALCIUM SERPL-MCNC: 8.9 MG/DL (ref 8.5–10.1)
CHLORIDE BLD-SCNC: 105 MMOL/L (ref 94–109)
CK SERPL-CCNC: 188 U/L (ref 30–300)
CO2 SERPL-SCNC: 29 MMOL/L (ref 20–32)
COLOR UR AUTO: YELLOW
CREAT SERPL-MCNC: 1.18 MG/DL (ref 0.66–1.25)
CREAT UR-MCNC: 398 MG/DL
GFR SERPL CREATININE-BSD FRML MDRD: 76 ML/MIN/1.73M2
GLUCOSE BLD-MCNC: 172 MG/DL (ref 70–99)
GLUCOSE UR STRIP-MCNC: NEGATIVE MG/DL
HBA1C MFR BLD: 9.2 % (ref 0–5.6)
HGB UR QL STRIP: ABNORMAL
KETONES UR STRIP-MCNC: NEGATIVE MG/DL
LEUKOCYTE ESTERASE UR QL STRIP: NEGATIVE
MICROALBUMIN UR-MCNC: 2670 MG/L
MICROALBUMIN/CREAT UR: 670.85 MG/G CR (ref 0–17)
NITRATE UR QL: NEGATIVE
PH UR STRIP: 6 [PH] (ref 5–7)
POTASSIUM BLD-SCNC: 3.5 MMOL/L (ref 3.4–5.3)
PROT SERPL-MCNC: 7.6 G/DL (ref 6.8–8.8)
RBC #/AREA URNS AUTO: ABNORMAL /HPF
SODIUM SERPL-SCNC: 139 MMOL/L (ref 133–144)
SP GR UR STRIP: >=1.03 (ref 1–1.03)
SQUAMOUS #/AREA URNS AUTO: ABNORMAL /LPF
UROBILINOGEN UR STRIP-ACNC: 0.2 E.U./DL
WBC #/AREA URNS AUTO: ABNORMAL /HPF

## 2022-10-19 PROCEDURE — 80053 COMPREHEN METABOLIC PANEL: CPT

## 2022-10-19 PROCEDURE — 36415 COLL VENOUS BLD VENIPUNCTURE: CPT

## 2022-10-19 PROCEDURE — 82043 UR ALBUMIN QUANTITATIVE: CPT

## 2022-10-19 PROCEDURE — 83036 HEMOGLOBIN GLYCOSYLATED A1C: CPT

## 2022-10-19 PROCEDURE — 81001 URINALYSIS AUTO W/SCOPE: CPT

## 2022-10-19 PROCEDURE — 82550 ASSAY OF CK (CPK): CPT

## 2022-10-28 ENCOUNTER — OFFICE VISIT (OUTPATIENT)
Dept: FAMILY MEDICINE | Facility: CLINIC | Age: 47
End: 2022-10-28
Payer: COMMERCIAL

## 2022-10-28 VITALS
HEIGHT: 65 IN | WEIGHT: 243.8 LBS | HEART RATE: 75 BPM | DIASTOLIC BLOOD PRESSURE: 72 MMHG | TEMPERATURE: 97.5 F | OXYGEN SATURATION: 95 % | SYSTOLIC BLOOD PRESSURE: 120 MMHG | BODY MASS INDEX: 40.62 KG/M2

## 2022-10-28 DIAGNOSIS — E78.5 HYPERLIPIDEMIA LDL GOAL <100: ICD-10-CM

## 2022-10-28 DIAGNOSIS — I10 BENIGN ESSENTIAL HYPERTENSION: ICD-10-CM

## 2022-10-28 DIAGNOSIS — Z13.220 SCREENING FOR HYPERLIPIDEMIA: ICD-10-CM

## 2022-10-28 DIAGNOSIS — E11.9 NEW ONSET TYPE 2 DIABETES MELLITUS (H): ICD-10-CM

## 2022-10-28 PROCEDURE — 99214 OFFICE O/P EST MOD 30 MIN: CPT | Performed by: FAMILY MEDICINE

## 2022-10-28 RX ORDER — LOSARTAN POTASSIUM 100 MG/1
100 TABLET ORAL DAILY
Qty: 90 TABLET | Refills: 3 | Status: SHIPPED | OUTPATIENT
Start: 2022-10-28 | End: 2023-11-17

## 2022-10-28 RX ORDER — AMLODIPINE BESYLATE 10 MG/1
10 TABLET ORAL DAILY
Qty: 90 TABLET | Refills: 3 | Status: SHIPPED | OUTPATIENT
Start: 2022-10-28 | End: 2023-11-21

## 2022-10-28 RX ORDER — ATORVASTATIN CALCIUM 20 MG/1
20 TABLET, FILM COATED ORAL DAILY
Qty: 90 TABLET | Refills: 1 | Status: SHIPPED | OUTPATIENT
Start: 2022-10-28 | End: 2023-04-05

## 2022-10-28 ASSESSMENT — PAIN SCALES - GENERAL: PAINLEVEL: NO PAIN (0)

## 2022-10-28 NOTE — PROGRESS NOTES
"  Assessment & Plan     New onset type 2 diabetes mellitus (H)  Diabetes not well controlled suggested to increase metformin to 50 mg twice daily and will add semaglutide Ozempic once a week.  Dietary habits were discussed.  - Lipid panel reflex to direct LDL Non-fasting; Future  - metFORMIN (GLUCOPHAGE) 850 MG tablet; Take 1 tablet (850 mg) by mouth 2 times daily (with meals)  - losartan (COZAAR) 100 MG tablet; Take 1 tablet (100 mg) by mouth daily  - semaglutide (OZEMPIC) 2 MG/1.5ML SOPN pen; Inject 0.5 mg Subcutaneous every 7 days    Screening for hyperlipidemia    - Lipid panel reflex to direct LDL Non-fasting; Future    Hyperlipidemia LDL goal <100    - atorvastatin (LIPITOR) 20 MG tablet; Take 1 tablet (20 mg) by mouth daily    Benign essential hypertension  Advised patient to continue losartan and amlodipine.  Discussed with him it will help with his proteinuria we will diabetes  - losartan (COZAAR) 100 MG tablet; Take 1 tablet (100 mg) by mouth daily  - amLODIPine (NORVASC) 10 MG tablet; Take 1 tablet (10 mg) by mouth daily             BMI:   Estimated body mass index is 40.57 kg/m  as calculated from the following:    Height as of this encounter: 5' 5\" (1.651 m).    Weight as of this encounter: 243 lb 12.8 oz (110.6 kg).           Return in about 3 months (around 1/28/2023) for Physical Exam.    Fabrizio Quach MD  [facility] [place] Z[place] is a 47 year old, presenting for the following health issues:  Diabetes (Follow up )    Patient came today for follow-up on his diabetes.  He has been diagnosed with diabetes however he is not taking his medication his A1c currently at 9.2 lifestyle is very sedentary and he understand that his eating habits are not great.  He is willing to to start some medication including metformin along with some injectable to see if that helps.  Blood pressure is stable he is currently taking his medication.  Along with cholesterol.  History of Present " "Illness       Diabetes:   He presents for follow up of diabetes.  He is not checking blood glucose. He is concerned about other.  He is not experiencing numbness or burning in feet, excessive thirst, blurry vision, weight changes or redness, sores or blisters on feet. The patient has not had a diabetic eye exam in the last 12 months.         Hypertension: He presents for follow up of hypertension.  He does not check blood pressure  regularly outside of the clinic. Outside blood pressures have been over 140/90. He does not follow a low salt diet.           Review of Systems   Constitutional, HEENT, cardiovascular, pulmonary, gi and gu systems are negative, except as otherwise noted.      Objective    /72 (BP Location: Right arm, Patient Position: Sitting, Cuff Size: Adult Large)   Pulse 75   Temp 97.5  F (36.4  C) (Tympanic)   Ht 5' 5\" (1.651 m)   Wt 243 lb 12.8 oz (110.6 kg)   SpO2 95%   BMI 40.57 kg/m    Body mass index is 40.57 kg/m .  Physical Exam   GENERAL: healthy, alert and no distress  NECK: no adenopathy, no asymmetry, masses, or scars and thyroid normal to palpation  RESP: lungs clear to auscultation - no rales, rhonchi or wheezes  CV: regular rate and rhythm, normal S1 S2, no S3 or S4, no murmur, click or rub, no peripheral edema and peripheral pulses strong  ABDOMEN: soft, nontender, no hepatosplenomegaly, no masses and bowel sounds normal  MS: no gross musculoskeletal defects noted, no edema                "

## 2023-01-04 ENCOUNTER — OFFICE VISIT (OUTPATIENT)
Dept: FAMILY MEDICINE | Facility: CLINIC | Age: 48
End: 2023-01-04
Payer: COMMERCIAL

## 2023-01-04 VITALS
OXYGEN SATURATION: 98 % | DIASTOLIC BLOOD PRESSURE: 70 MMHG | RESPIRATION RATE: 16 BRPM | SYSTOLIC BLOOD PRESSURE: 110 MMHG | HEIGHT: 65 IN | TEMPERATURE: 97.6 F | BODY MASS INDEX: 38.62 KG/M2 | HEART RATE: 85 BPM | WEIGHT: 231.8 LBS

## 2023-01-04 DIAGNOSIS — Z12.5 SCREENING FOR PROSTATE CANCER: Primary | ICD-10-CM

## 2023-01-04 DIAGNOSIS — Z23 NEED FOR COVID-19 VACCINE: ICD-10-CM

## 2023-01-04 DIAGNOSIS — Z12.11 SCREEN FOR COLON CANCER: ICD-10-CM

## 2023-01-04 DIAGNOSIS — E78.5 HYPERLIPIDEMIA LDL GOAL <100: ICD-10-CM

## 2023-01-04 DIAGNOSIS — E66.01 MORBID OBESITY (H): ICD-10-CM

## 2023-01-04 DIAGNOSIS — E11.29 TYPE 2 DIABETES MELLITUS WITH MICROALBUMINURIA, WITHOUT LONG-TERM CURRENT USE OF INSULIN (H): ICD-10-CM

## 2023-01-04 DIAGNOSIS — R80.9 TYPE 2 DIABETES MELLITUS WITH MICROALBUMINURIA, WITHOUT LONG-TERM CURRENT USE OF INSULIN (H): ICD-10-CM

## 2023-01-04 DIAGNOSIS — R74.8 ELEVATED ALKALINE PHOSPHATASE LEVEL: ICD-10-CM

## 2023-01-04 DIAGNOSIS — E11.9 NEW ONSET TYPE 2 DIABETES MELLITUS (H): ICD-10-CM

## 2023-01-04 LAB
ALBUMIN SERPL BCG-MCNC: 4.6 G/DL (ref 3.5–5.2)
ALP SERPL-CCNC: 158 U/L (ref 40–129)
ALT SERPL W P-5'-P-CCNC: 40 U/L (ref 10–50)
ANION GAP SERPL CALCULATED.3IONS-SCNC: 16 MMOL/L (ref 7–15)
AST SERPL W P-5'-P-CCNC: 28 U/L (ref 10–50)
BILIRUB SERPL-MCNC: 0.5 MG/DL
BUN SERPL-MCNC: 17.6 MG/DL (ref 6–20)
CALCIUM SERPL-MCNC: 8.8 MG/DL (ref 8.6–10)
CHLORIDE SERPL-SCNC: 105 MMOL/L (ref 98–107)
CHOLEST SERPL-MCNC: 123 MG/DL
CREAT SERPL-MCNC: 1.29 MG/DL (ref 0.67–1.17)
DEPRECATED HCO3 PLAS-SCNC: 20 MMOL/L (ref 22–29)
GFR SERPL CREATININE-BSD FRML MDRD: 68 ML/MIN/1.73M2
GLUCOSE SERPL-MCNC: 99 MG/DL (ref 70–99)
HBA1C MFR BLD: 6.5 % (ref 0–5.6)
HDLC SERPL-MCNC: 31 MG/DL
LDLC SERPL CALC-MCNC: 44 MG/DL
NONHDLC SERPL-MCNC: 92 MG/DL
POTASSIUM SERPL-SCNC: 3.5 MMOL/L (ref 3.4–5.3)
PROT SERPL-MCNC: 7.4 G/DL (ref 6.4–8.3)
SODIUM SERPL-SCNC: 141 MMOL/L (ref 136–145)
TRIGL SERPL-MCNC: 242 MG/DL

## 2023-01-04 PROCEDURE — G0103 PSA SCREENING: HCPCS | Performed by: FAMILY MEDICINE

## 2023-01-04 PROCEDURE — 90471 IMMUNIZATION ADMIN: CPT | Performed by: FAMILY MEDICINE

## 2023-01-04 PROCEDURE — 99214 OFFICE O/P EST MOD 30 MIN: CPT | Mod: 25 | Performed by: FAMILY MEDICINE

## 2023-01-04 PROCEDURE — 90677 PCV20 VACCINE IM: CPT | Performed by: FAMILY MEDICINE

## 2023-01-04 PROCEDURE — 36415 COLL VENOUS BLD VENIPUNCTURE: CPT | Performed by: FAMILY MEDICINE

## 2023-01-04 PROCEDURE — 91312 COVID-19 VACCINE BIVALENT BOOSTER 12+ (PFIZER): CPT | Performed by: FAMILY MEDICINE

## 2023-01-04 PROCEDURE — 80061 LIPID PANEL: CPT | Performed by: FAMILY MEDICINE

## 2023-01-04 PROCEDURE — 0124A COVID-19 VACCINE BIVALENT BOOSTER 12+ (PFIZER): CPT | Performed by: FAMILY MEDICINE

## 2023-01-04 PROCEDURE — 80053 COMPREHEN METABOLIC PANEL: CPT | Performed by: FAMILY MEDICINE

## 2023-01-04 PROCEDURE — 83036 HEMOGLOBIN GLYCOSYLATED A1C: CPT | Performed by: FAMILY MEDICINE

## 2023-01-04 ASSESSMENT — PAIN SCALES - GENERAL: PAINLEVEL: NO PAIN (0)

## 2023-01-04 NOTE — PROGRESS NOTES
"  Assessment & Plan     Screen for colon cancer    - Colonoscopy Screening  Referral; Future    Screening for prostate cancer    - PSA, screen; Future  - PSA, screen    Type 2 diabetes mellitus with microalbuminuria, without long-term current use of insulin (H)  Diabetic control is significantly improved.  I would like to increase the dose of Ozempic to help him with diabetic management as well as weight loss therefore increasing the dose of Ozempic to 1 mg/week instead of 0.5 mg/week.  Patient agrees with that.  Decreasing dose of metformin from 850 mg twice a day to 500 mg twice a day.  Follow-up in 3 months for recheck  - Hemoglobin A1c; Future  - Adult Eye  Referral; Future    Hyperlipidemia LDL goal <100  Cholesterol control has significantly improved.  - Lipid panel reflex to direct LDL Fasting; Future  - Comprehensive metabolic panel; Future  - Lipid panel reflex to direct LDL Fasting  - Comprehensive metabolic panel    Morbid obesity (H)  Patient has lost weight since starting Ozempic.  He tells that he has early satiety as well.  BMI has improved from 40.5-38.5.  We will continue to follow him along.  Resume Ozempic.  Encouraged to exercise regularly and eat healthy.  Patient agrees to the plan    Need for COVID-19 vaccine    - COVID-19 VACCINE BIVALENT BOOSTER 12+ (PFIZER)    Elevated alkaline phosphatase level  Patient is noted to have elevated alkaline phosphatase on lab work done today.  Recommending an ultrasound of the abdomen.  I suspect fatty liver but would like to rule out other etiologies.  Patient agrees to that  - US Abdomen Limited; Future             BMI:   Estimated body mass index is 38.57 kg/m  as calculated from the following:    Height as of this encounter: 1.651 m (5' 5\").    Weight as of this encounter: 105.1 kg (231 lb 12.8 oz).           Return in about 6 months (around 7/4/2023) for Diabetes Recheck.    Henry Cesar MD  [facility] " "[place] Zuniga is a 47 year old, presenting for the following health issues:  Recheck Medication (Follow up blood sugar./Blood work - Pt is fasting)      History of Present Illness       Diabetes:   He presents for follow up of diabetes.  He is not checking blood glucose. He is concerned about other.  He is not experiencing numbness or burning in feet, excessive thirst, blurry vision, weight changes or redness, sores or blisters on feet. The patient has not had a diabetic eye exam in the last 12 months.         Hypertension: He presents for follow up of hypertension.  He does not check blood pressure  regularly outside of the clinic. Outpatient blood pressures have not been over 140/90. He follows a low salt diet.         Hyperlipidemia Follow-Up      Are you regularly taking any medication or supplement to lower your cholesterol?   Yes- statin    Are you having muscle aches or other side effects that you think could be caused by your cholesterol lowering medication?  No      Obesity management: He has been using Ozempic and has noted great benefit with weight loss management.  He is very happy about that.  Excited to tell me that his clothes fit him much looser now.  He would like to see if he could increase the dose of Ozempic.  Currently on 0.5 mg/week injections.        Review of Systems   CONSTITUTIONAL: NEGATIVE for fever, chills, change in weight  ENT/MOUTH: NEGATIVE for ear, mouth and throat problems  RESP: NEGATIVE for significant cough or SOB  CV: NEGATIVE for chest pain, palpitations or peripheral edema      Objective    /70   Pulse 85   Temp 97.6  F (36.4  C) (Tympanic)   Resp 16   Ht 1.651 m (5' 5\")   Wt 105.1 kg (231 lb 12.8 oz)   SpO2 98%   BMI 38.57 kg/m    Body mass index is 38.57 kg/m .  Physical Exam   GENERAL: healthy, alert and no distress  NECK: no adenopathy, no asymmetry, masses, or scars and thyroid normal to palpation  RESP: lungs clear to auscultation - no " rales, rhonchi or wheezes  CV: regular rate and rhythm, normal S1 S2, no S3 or S4, no murmur, click or rub, no peripheral edema and peripheral pulses strong  ABDOMEN: soft, nontender, no hepatosplenomegaly, no masses and bowel sounds normal  MS: no gross musculoskeletal defects noted, no edema

## 2023-01-05 LAB — PSA SERPL-MCNC: 1.87 NG/ML (ref 0–2.5)

## 2023-01-09 DIAGNOSIS — E11.9 NEW ONSET TYPE 2 DIABETES MELLITUS (H): ICD-10-CM

## 2023-01-09 DIAGNOSIS — E11.9 TYPE 2 DIABETES, HBA1C GOAL < 7% (H): Primary | ICD-10-CM

## 2023-01-09 RX ORDER — SEMAGLUTIDE 1.34 MG/ML
1 INJECTION, SOLUTION SUBCUTANEOUS WEEKLY
Qty: 3 ML | Refills: 4 | Status: SHIPPED | OUTPATIENT
Start: 2023-01-09 | End: 2023-12-26

## 2023-02-20 ENCOUNTER — OFFICE VISIT (OUTPATIENT)
Dept: OPHTHALMOLOGY | Facility: CLINIC | Age: 48
End: 2023-02-20
Attending: FAMILY MEDICINE
Payer: COMMERCIAL

## 2023-02-20 DIAGNOSIS — E11.9 NEW ONSET TYPE 2 DIABETES MELLITUS (H): ICD-10-CM

## 2023-02-20 DIAGNOSIS — H02.023 MECHANICAL ENTROPION OF RIGHT EYE: ICD-10-CM

## 2023-02-20 DIAGNOSIS — H52.01 HYPEROPIA OF RIGHT EYE: ICD-10-CM

## 2023-02-20 DIAGNOSIS — H52.202 MYOPIA OF LEFT EYE WITH ASTIGMATISM: ICD-10-CM

## 2023-02-20 DIAGNOSIS — H04.123 DRY EYE SYNDROME OF BOTH EYES: ICD-10-CM

## 2023-02-20 DIAGNOSIS — H02.59 FLOPPY EYELID SYNDROME OF RIGHT EYE: ICD-10-CM

## 2023-02-20 DIAGNOSIS — H17.9 CORNEAL SCAR: ICD-10-CM

## 2023-02-20 DIAGNOSIS — H10.89 PAPILLARY CONJUNCTIVITIS: ICD-10-CM

## 2023-02-20 DIAGNOSIS — H52.4 PRESBYOPIA: ICD-10-CM

## 2023-02-20 DIAGNOSIS — H52.12 MYOPIA OF LEFT EYE WITH ASTIGMATISM: ICD-10-CM

## 2023-02-20 DIAGNOSIS — E11.9 TYPE 2 DIABETES MELLITUS WITHOUT RETINOPATHY (H): Primary | ICD-10-CM

## 2023-02-20 PROCEDURE — 92012 INTRM OPH EXAM EST PATIENT: CPT | Performed by: OPHTHALMOLOGY

## 2023-02-20 PROCEDURE — 92015 DETERMINE REFRACTIVE STATE: CPT | Performed by: OPHTHALMOLOGY

## 2023-02-20 RX ORDER — LOTEPREDNOL ETABONATE 5 MG/ML
1-2 SUSPENSION/ DROPS OPHTHALMIC 4 TIMES DAILY
Qty: 10 ML | Refills: 3 | Status: SHIPPED | OUTPATIENT
Start: 2023-02-20 | End: 2024-05-15

## 2023-02-20 ASSESSMENT — CONF VISUAL FIELD
OS_NORMAL: 1
OS_INFERIOR_TEMPORAL_RESTRICTION: 0
OD_NORMAL: 1
METHOD: COUNTING FINGERS
OD_SUPERIOR_NASAL_RESTRICTION: 0
OD_INFERIOR_TEMPORAL_RESTRICTION: 0
OS_SUPERIOR_TEMPORAL_RESTRICTION: 0
OS_INFERIOR_NASAL_RESTRICTION: 0
OS_SUPERIOR_NASAL_RESTRICTION: 0
OD_INFERIOR_NASAL_RESTRICTION: 0
OD_SUPERIOR_TEMPORAL_RESTRICTION: 0

## 2023-02-20 ASSESSMENT — REFRACTION_MANIFEST
OD_CYLINDER: SPHERE
OD_SPHERE: +0.75
OS_ADD: +2.00
OS_SPHERE: -0.25
OD_ADD: +2.00
OS_CYLINDER: +0.75
OS_AXIS: 030

## 2023-02-20 ASSESSMENT — TONOMETRY
OS_IOP_MMHG: 15
OD_IOP_MMHG: 11
IOP_METHOD: TONOPEN

## 2023-02-20 ASSESSMENT — CUP TO DISC RATIO
OS_RATIO: 0.45
OD_RATIO: 0.5

## 2023-02-20 ASSESSMENT — SLIT LAMP EXAM - LIDS: COMMENTS: NORMAL

## 2023-02-20 ASSESSMENT — VISUAL ACUITY
OD_SC: 20/30
OD_SC+: -2
OS_SC: 20/20
OS_SC+: -1
METHOD: SNELLEN - LINEAR

## 2023-02-20 NOTE — PROGRESS NOTES
HPI     Eye Exam For Diabetes    In both eyes.  Associated symptoms include Negative for eye pain, flashes and floaters.  Treatments tried include artificial tears.           Comments    Here for diabetic exam. Vision in the right has been gradually worsening and blurry. The left eye is doing fine. Uses lubricating drops as needed. No flashes or floaters. No eye pain.    LBS: does not monitor  Lab Results       Component                Value               Date                       A1C                      6.5                 01/04/2023                 A1C                      9.2                 10/19/2022                 A1C                      7.3                 11/19/2021                 A1C                      6.8                 08/04/2021                 A1C                      6.0                 08/17/2017                 A1C                      6.0                 04/11/2017                 A1C                      5.9                 10/22/2015              Dario Toya COT 9:03 AM February 20, 2023     Dad with Age related macular degeneration in [facility]             Last edited by Dusty Maldonado MD on 2/20/2023 10:07 AM.         Review of systems for the eyes was negative other than the pertinent positives/negatives listed in the HPI.      Assessment & Plan    HPI:  Efrain Lock is a 47 year old male with history of T2DM, HLD, HTN, floppy eyelids s/p repair presents for annual dilated fundus exam. Uses readers as needed. Uses AT as needed. Notes right eye vision is blurry     POHx:  floppy eyelids s/p repair  PMHx:  T2DM, HLD, HTN  Current Medications: amLODIPine (NORVASC) 10 MG tablet, Take 1 tablet (10 mg) by mouth daily  atorvastatin (LIPITOR) 20 MG tablet, Take 1 tablet (20 mg) by mouth daily  hydrocortisone 2.5 % ointment, Apply topically 2 times daily  losartan (COZAAR) 100 MG tablet, Take 1 tablet (100 mg) by mouth daily  metFORMIN (GLUCOPHAGE) 500 MG tablet, Take 1 tablet  (500 mg) by mouth 2 times daily (with meals)  Multiple Vitamins-Minerals (MULTIVITAMIN ADULT PO),   Semaglutide, 1 MG/DOSE, (OZEMPIC, 1 MG/DOSE,) 4 MG/3ML SOPN, Inject 1 mg Subcutaneous once a week    No current facility-administered medications on file prior to visit.    FHx:  PSHx: right lower lid ectropion, right upper lid entropion repair 12/3/20 (Mojuvenaltarzade)      Current Eye Medications:  AT PRN    Assessment & Plan:  (E11.9) Type 2 diabetes mellitus without retinopathy (H)  (primary encounter diagnosis)  Diagnosed 2021  Most recent HgBA1c 6.5 on 1/4/23 down from 9.2  No background diabetic retinopathy or neovascularization noted on today's exam.  Discussed ocular and systemic benefits of blood pressure and blood sugar control.  Return in 1 year for full exam with dilation or sooner if changes to vision.      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  The patient may wish to update the glasses if desired. 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        Dusty Maldonado MD     Attending Physician Attestation:  Complete documentation of historical and exam elements from today's encounter can be found in the full encounter summary report (not reduplicated in this progress note).  I personally obtained the chief complaint(s) and history of present illness.  I  confirmed and edited as necessary the review of systems, past medical/surgical history, family history, social history, and examination findings as documented by others; and I examined the patient myself.  I personally reviewed the relevant tests, images, and reports as documented above.  I formulated and edited as necessary the assessment and plan and discussed the findings and management plan with the patient and family. - Dusty Maldonado MD

## 2023-02-20 NOTE — NURSING NOTE
Chief Complaints and History of Present Illnesses   Patient presents with     Eye Exam For Diabetes     Chief Complaint(s) and History of Present Illness(es)     Eye Exam For Diabetes            Laterality: both eyes    Associated symptoms: Negative for eye pain, flashes and floaters    Treatments tried: artificial tears          Comments    Here for diabetic exam. Vision in the right has been gradually worsening and blurry. The left eye is doing fine. Uses lubricating drops as needed. No flashes or floaters. No eye pain.    LBS: does not monitor  Lab Results       Component                Value               Date                       A1C                      6.5                 01/04/2023                 A1C                      9.2                 10/19/2022                 A1C                      7.3                 11/19/2021                 A1C                      6.8                 08/04/2021                 A1C                      6.0                 08/17/2017                 A1C                      6.0                 04/11/2017                 A1C                      5.9                 10/22/2015              Dario Pittman COT 9:03 AM February 20, 2023

## 2023-03-10 ENCOUNTER — OFFICE VISIT (OUTPATIENT)
Dept: OPHTHALMOLOGY | Facility: CLINIC | Age: 48
End: 2023-03-10
Payer: COMMERCIAL

## 2023-03-10 DIAGNOSIS — H17.9 CORNEAL SCAR: Primary | ICD-10-CM

## 2023-03-10 DIAGNOSIS — H04.123 DRY EYE SYNDROME OF BOTH EYES: ICD-10-CM

## 2023-03-10 PROCEDURE — 99213 OFFICE O/P EST LOW 20 MIN: CPT | Performed by: OPHTHALMOLOGY

## 2023-03-10 RX ORDER — NEOMYCIN SULFATE, POLYMYXIN B SULFATE, AND DEXAMETHASONE 3.5; 10000; 1 MG/G; [USP'U]/G; MG/G
0.5 OINTMENT OPHTHALMIC AT BEDTIME
Qty: 3.5 G | Refills: 1 | Status: SHIPPED | OUTPATIENT
Start: 2023-03-10 | End: 2023-03-23

## 2023-03-10 ASSESSMENT — VISUAL ACUITY
METHOD: SNELLEN - LINEAR
OD_SC: 20/30
OS_SC: 20/20

## 2023-03-10 ASSESSMENT — TONOMETRY
IOP_METHOD: APPLANATION
OS_IOP_MMHG: 13
OD_IOP_MMHG: 12

## 2023-03-10 ASSESSMENT — SLIT LAMP EXAM - LIDS: COMMENTS: NORMAL

## 2023-03-10 NOTE — NURSING NOTE
Chief Complaints and History of Present Illnesses   Patient presents with     Dry Eye(s)       Chief Complaint(s) and History of Present Illness(es)     Dry Eye(s)           Comments    He is here for follow up Papillary conjunctivitis. Dry eye syndrome of both eyes.  He is using Tobradex 2-3x a day on both eyes.   He feels his eyes are the same since starting the eye drop.   Eyes are still itching.                  DANO Rodrigez  10:43 AM 03/10/2023     yes

## 2023-03-10 NOTE — PROGRESS NOTES
HPI    He is here for follow up Papillary conjunctivitis. Dry eye syndrome of both eyes.  He is using lotemax 2-3x a day on both eyes.   He feels his eyes are the same since starting the eye drop.   Eyes are still itching.   Last edited by Dusty Maldonado MD on 3/10/2023 11:31 AM.         Review of systems for the eyes was negative other than the pertinent positives/negatives listed in the HPI.      Assessment & Plan    HPI:  Efrain Lock is a 47 year old male with history of T2DM, HLD, HTN, floppy eyelids s/p repair presents for followup papillary conjunctivitis. Using lotemax 2-3x daily, AT 2-3x daily     POHx:  floppy eyelids s/p repair  PMHx:  T2DM, HLD, HTN  Current Medications: amLODIPine (NORVASC) 10 MG tablet, Take 1 tablet (10 mg) by mouth daily  atorvastatin (LIPITOR) 20 MG tablet, Take 1 tablet (20 mg) by mouth daily  hydrocortisone 2.5 % ointment, Apply topically 2 times daily  losartan (COZAAR) 100 MG tablet, Take 1 tablet (100 mg) by mouth daily  loteprednol (LOTEMAX) 0.5 % ophthalmic suspension, Place 1-2 drops into the right eye 4 times daily  metFORMIN (GLUCOPHAGE) 500 MG tablet, Take 1 tablet (500 mg) by mouth 2 times daily (with meals)  Multiple Vitamins-Minerals (MULTIVITAMIN ADULT PO),   Semaglutide, 1 MG/DOSE, (OZEMPIC, 1 MG/DOSE,) 4 MG/3ML SOPN, Inject 1 mg Subcutaneous once a week    No current facility-administered medications on file prior to visit.    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  Discussed ocular and systemic benefits of blood pressure and blood sugar control.  Return in 1 year for full exam with dilation or sooner if changes to vision.      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        Dusty Maldonado MD     Attending Physician Attestation:  Complete documentation of historical and exam elements from today's encounter can be found in the full encounter summary report (not reduplicated in this progress note).  I personally obtained the chief complaint(s) and history of present illness.  I confirmed and edited as necessary the review of systems, past medical/surgical history, family history, social history, and examination findings as documented by others; and I examined the patient myself.  I personally reviewed the relevant tests, images, and reports as documented above.  I formulated and edited as necessary the assessment and plan and discussed the findings and management plan with the patient and family. - Dusty Maldonado MD

## 2023-03-10 NOTE — PATIENT INSTRUCTIONS
Consider changing to iVizia for your artificial tears  Use new maxtriol ointment at bedtime  Continue lotemax and artificial tears four times a day

## 2023-03-23 ENCOUNTER — ANESTHESIA (OUTPATIENT)
Dept: SURGERY | Facility: CLINIC | Age: 48
DRG: 339 | End: 2023-03-23
Payer: COMMERCIAL

## 2023-03-23 ENCOUNTER — HOSPITAL ENCOUNTER (INPATIENT)
Facility: CLINIC | Age: 48
LOS: 9 days | Discharge: HOME | DRG: 339 | End: 2023-04-01
Attending: EMERGENCY MEDICINE | Admitting: SURGERY
Payer: COMMERCIAL

## 2023-03-23 ENCOUNTER — APPOINTMENT (OUTPATIENT)
Dept: CT IMAGING | Facility: CLINIC | Age: 48
DRG: 339 | End: 2023-03-23
Attending: EMERGENCY MEDICINE
Payer: COMMERCIAL

## 2023-03-23 ENCOUNTER — ANESTHESIA EVENT (OUTPATIENT)
Dept: SURGERY | Facility: CLINIC | Age: 48
DRG: 339 | End: 2023-03-23
Payer: COMMERCIAL

## 2023-03-23 DIAGNOSIS — K35.32 PERFORATED APPENDICITIS: ICD-10-CM

## 2023-03-23 LAB
ALBUMIN SERPL BCG-MCNC: 4.6 G/DL (ref 3.5–5.2)
ALP SERPL-CCNC: 148 U/L (ref 40–129)
ALT SERPL W P-5'-P-CCNC: 39 U/L (ref 10–50)
ANION GAP SERPL CALCULATED.3IONS-SCNC: 12 MMOL/L (ref 7–15)
AST SERPL W P-5'-P-CCNC: 25 U/L (ref 10–50)
BASOPHILS # BLD AUTO: 0 10E3/UL (ref 0–0.2)
BASOPHILS NFR BLD AUTO: 0 %
BILIRUB SERPL-MCNC: 0.8 MG/DL
BUN SERPL-MCNC: 13.1 MG/DL (ref 6–20)
CALCIUM SERPL-MCNC: 9.7 MG/DL (ref 8.6–10)
CHLORIDE SERPL-SCNC: 97 MMOL/L (ref 98–107)
CREAT SERPL-MCNC: 1.56 MG/DL (ref 0.67–1.17)
DEPRECATED HCO3 PLAS-SCNC: 24 MMOL/L (ref 22–29)
EOSINOPHIL # BLD AUTO: 0 10E3/UL (ref 0–0.7)
EOSINOPHIL NFR BLD AUTO: 0 %
ERYTHROCYTE [DISTWIDTH] IN BLOOD BY AUTOMATED COUNT: 12.5 % (ref 10–15)
GFR SERPL CREATININE-BSD FRML MDRD: 54 ML/MIN/1.73M2
GLUCOSE BLDC GLUCOMTR-MCNC: 122 MG/DL (ref 70–99)
GLUCOSE BLDC GLUCOMTR-MCNC: 137 MG/DL (ref 70–99)
GLUCOSE BLDC GLUCOMTR-MCNC: 99 MG/DL (ref 70–99)
GLUCOSE SERPL-MCNC: 140 MG/DL (ref 70–99)
GRAM STAIN RESULT: NORMAL
GRAM STAIN RESULT: NORMAL
HCT VFR BLD AUTO: 47.6 % (ref 40–53)
HGB BLD-MCNC: 15.7 G/DL (ref 13.3–17.7)
IMM GRANULOCYTES # BLD: 0 10E3/UL
IMM GRANULOCYTES NFR BLD: 0 %
LIPASE SERPL-CCNC: 37 U/L (ref 13–60)
LYMPHOCYTES # BLD AUTO: 1.1 10E3/UL (ref 0.8–5.3)
LYMPHOCYTES NFR BLD AUTO: 12 %
MCH RBC QN AUTO: 28.6 PG (ref 26.5–33)
MCHC RBC AUTO-ENTMCNC: 33 G/DL (ref 31.5–36.5)
MCV RBC AUTO: 87 FL (ref 78–100)
MONOCYTES # BLD AUTO: 0.6 10E3/UL (ref 0–1.3)
MONOCYTES NFR BLD AUTO: 6 %
NEUTROPHILS # BLD AUTO: 8 10E3/UL (ref 1.6–8.3)
NEUTROPHILS NFR BLD AUTO: 82 %
NRBC # BLD AUTO: 0 10E3/UL
NRBC BLD AUTO-RTO: 0 /100
PLATELET # BLD AUTO: 309 10E3/UL (ref 150–450)
POTASSIUM SERPL-SCNC: 3.6 MMOL/L (ref 3.4–5.3)
PROT SERPL-MCNC: 8.1 G/DL (ref 6.4–8.3)
RBC # BLD AUTO: 5.49 10E6/UL (ref 4.4–5.9)
SODIUM SERPL-SCNC: 133 MMOL/L (ref 136–145)
WBC # BLD AUTO: 9.7 10E3/UL (ref 4–11)

## 2023-03-23 PROCEDURE — 87205 SMEAR GRAM STAIN: CPT | Performed by: SURGERY

## 2023-03-23 PROCEDURE — 360N000076 HC SURGERY LEVEL 3, PER MIN: Performed by: SURGERY

## 2023-03-23 PROCEDURE — 250N000009 HC RX 250: Performed by: EMERGENCY MEDICINE

## 2023-03-23 PROCEDURE — 258N000003 HC RX IP 258 OP 636: Performed by: EMERGENCY MEDICINE

## 2023-03-23 PROCEDURE — 250N000011 HC RX IP 250 OP 636: Performed by: ANESTHESIOLOGY

## 2023-03-23 PROCEDURE — 710N000009 HC RECOVERY PHASE 1, LEVEL 1, PER MIN: Performed by: SURGERY

## 2023-03-23 PROCEDURE — 87075 CULTR BACTERIA EXCEPT BLOOD: CPT | Performed by: SURGERY

## 2023-03-23 PROCEDURE — 258N000003 HC RX IP 258 OP 636: Performed by: PHYSICIAN ASSISTANT

## 2023-03-23 PROCEDURE — 250N000011 HC RX IP 250 OP 636: Performed by: PHYSICIAN ASSISTANT

## 2023-03-23 PROCEDURE — G0378 HOSPITAL OBSERVATION PER HR: HCPCS

## 2023-03-23 PROCEDURE — 96375 TX/PRO/DX INJ NEW DRUG ADDON: CPT

## 2023-03-23 PROCEDURE — 83690 ASSAY OF LIPASE: CPT | Performed by: EMERGENCY MEDICINE

## 2023-03-23 PROCEDURE — 250N000011 HC RX IP 250 OP 636

## 2023-03-23 PROCEDURE — 258N000003 HC RX IP 258 OP 636

## 2023-03-23 PROCEDURE — 87070 CULTURE OTHR SPECIMN AEROBIC: CPT | Performed by: SURGERY

## 2023-03-23 PROCEDURE — 250N000011 HC RX IP 250 OP 636: Performed by: EMERGENCY MEDICINE

## 2023-03-23 PROCEDURE — 258N000001 HC RX 258: Performed by: SURGERY

## 2023-03-23 PROCEDURE — 96374 THER/PROPH/DIAG INJ IV PUSH: CPT | Mod: 59

## 2023-03-23 PROCEDURE — 999N000157 HC STATISTIC RCP TIME EA 10 MIN

## 2023-03-23 PROCEDURE — 96361 HYDRATE IV INFUSION ADD-ON: CPT

## 2023-03-23 PROCEDURE — 250N000013 HC RX MED GY IP 250 OP 250 PS 637: Performed by: PHYSICIAN ASSISTANT

## 2023-03-23 PROCEDURE — 250N000013 HC RX MED GY IP 250 OP 250 PS 637: Performed by: EMERGENCY MEDICINE

## 2023-03-23 PROCEDURE — 88304 TISSUE EXAM BY PATHOLOGIST: CPT | Mod: TC | Performed by: SURGERY

## 2023-03-23 PROCEDURE — 99285 EMERGENCY DEPT VISIT HI MDM: CPT | Mod: 25

## 2023-03-23 PROCEDURE — 120N000001 HC R&B MED SURG/OB

## 2023-03-23 PROCEDURE — 250N000025 HC SEVOFLURANE, PER MIN: Performed by: SURGERY

## 2023-03-23 PROCEDURE — 94660 CPAP INITIATION&MGMT: CPT

## 2023-03-23 PROCEDURE — 74177 CT ABD & PELVIS W/CONTRAST: CPT

## 2023-03-23 PROCEDURE — 82962 GLUCOSE BLOOD TEST: CPT

## 2023-03-23 PROCEDURE — 36415 COLL VENOUS BLD VENIPUNCTURE: CPT | Performed by: EMERGENCY MEDICINE

## 2023-03-23 PROCEDURE — 44970 LAPAROSCOPY APPENDECTOMY: CPT | Mod: AS | Performed by: PHYSICIAN ASSISTANT

## 2023-03-23 PROCEDURE — 370N000017 HC ANESTHESIA TECHNICAL FEE, PER MIN: Performed by: SURGERY

## 2023-03-23 PROCEDURE — 250N000009 HC RX 250: Performed by: SURGERY

## 2023-03-23 PROCEDURE — 44970 LAPAROSCOPY APPENDECTOMY: CPT | Performed by: SURGERY

## 2023-03-23 PROCEDURE — 250N000009 HC RX 250

## 2023-03-23 PROCEDURE — 999N000141 HC STATISTIC PRE-PROCEDURE NURSING ASSESSMENT: Performed by: SURGERY

## 2023-03-23 PROCEDURE — 80053 COMPREHEN METABOLIC PANEL: CPT | Performed by: EMERGENCY MEDICINE

## 2023-03-23 PROCEDURE — 0DTJ4ZZ RESECTION OF APPENDIX, PERCUTANEOUS ENDOSCOPIC APPROACH: ICD-10-PCS | Performed by: SURGERY

## 2023-03-23 PROCEDURE — 272N000001 HC OR GENERAL SUPPLY STERILE: Performed by: SURGERY

## 2023-03-23 PROCEDURE — 85025 COMPLETE CBC W/AUTO DIFF WBC: CPT | Performed by: EMERGENCY MEDICINE

## 2023-03-23 RX ORDER — DEXTROSE MONOHYDRATE 25 G/50ML
25-50 INJECTION, SOLUTION INTRAVENOUS
Status: DISCONTINUED | OUTPATIENT
Start: 2023-03-23 | End: 2023-04-01 | Stop reason: HOSPADM

## 2023-03-23 RX ORDER — NALOXONE HYDROCHLORIDE 0.4 MG/ML
0.2 INJECTION, SOLUTION INTRAMUSCULAR; INTRAVENOUS; SUBCUTANEOUS
Status: DISCONTINUED | OUTPATIENT
Start: 2023-03-23 | End: 2023-04-01 | Stop reason: HOSPADM

## 2023-03-23 RX ORDER — ONDANSETRON 4 MG/1
4 TABLET, ORALLY DISINTEGRATING ORAL EVERY 30 MIN PRN
Status: DISCONTINUED | OUTPATIENT
Start: 2023-03-23 | End: 2023-03-23 | Stop reason: HOSPADM

## 2023-03-23 RX ORDER — DIPHENHYDRAMINE HYDROCHLORIDE 50 MG/ML
25 INJECTION INTRAMUSCULAR; INTRAVENOUS EVERY 6 HOURS PRN
Status: DISCONTINUED | OUTPATIENT
Start: 2023-03-23 | End: 2023-04-01 | Stop reason: HOSPADM

## 2023-03-23 RX ORDER — METHOCARBAMOL 750 MG/1
750 TABLET, FILM COATED ORAL EVERY 6 HOURS PRN
Status: DISCONTINUED | OUTPATIENT
Start: 2023-03-23 | End: 2023-03-30

## 2023-03-23 RX ORDER — AMOXICILLIN 250 MG
1 CAPSULE ORAL 2 TIMES DAILY
Status: DISCONTINUED | OUTPATIENT
Start: 2023-03-23 | End: 2023-03-27

## 2023-03-23 RX ORDER — BISACODYL 10 MG
10 SUPPOSITORY, RECTAL RECTAL DAILY PRN
Status: DISCONTINUED | OUTPATIENT
Start: 2023-03-23 | End: 2023-03-30

## 2023-03-23 RX ORDER — OXYCODONE HYDROCHLORIDE 5 MG/1
5 TABLET ORAL EVERY 4 HOURS PRN
Status: DISCONTINUED | OUTPATIENT
Start: 2023-03-23 | End: 2023-04-01 | Stop reason: HOSPADM

## 2023-03-23 RX ORDER — FENTANYL CITRATE 0.05 MG/ML
50 INJECTION, SOLUTION INTRAMUSCULAR; INTRAVENOUS EVERY 5 MIN PRN
Status: DISCONTINUED | OUTPATIENT
Start: 2023-03-23 | End: 2023-03-23 | Stop reason: HOSPADM

## 2023-03-23 RX ORDER — LOSARTAN POTASSIUM 100 MG/1
100 TABLET ORAL DAILY
Status: DISCONTINUED | OUTPATIENT
Start: 2023-03-24 | End: 2023-04-01 | Stop reason: HOSPADM

## 2023-03-23 RX ORDER — FAMOTIDINE 20 MG/1
20 TABLET, FILM COATED ORAL 2 TIMES DAILY
Status: DISCONTINUED | OUTPATIENT
Start: 2023-03-23 | End: 2023-04-01 | Stop reason: HOSPADM

## 2023-03-23 RX ORDER — OXYCODONE HYDROCHLORIDE 5 MG/1
10 TABLET ORAL ONCE
Status: COMPLETED | OUTPATIENT
Start: 2023-03-23 | End: 2023-03-23

## 2023-03-23 RX ORDER — NALOXONE HYDROCHLORIDE 0.4 MG/ML
0.4 INJECTION, SOLUTION INTRAMUSCULAR; INTRAVENOUS; SUBCUTANEOUS
Status: DISCONTINUED | OUTPATIENT
Start: 2023-03-23 | End: 2023-04-01 | Stop reason: HOSPADM

## 2023-03-23 RX ORDER — PROPOFOL 10 MG/ML
INJECTION, EMULSION INTRAVENOUS PRN
Status: DISCONTINUED | OUTPATIENT
Start: 2023-03-23 | End: 2023-03-23

## 2023-03-23 RX ORDER — ATORVASTATIN CALCIUM 20 MG/1
20 TABLET, FILM COATED ORAL DAILY
Status: DISCONTINUED | OUTPATIENT
Start: 2023-03-24 | End: 2023-04-01 | Stop reason: HOSPADM

## 2023-03-23 RX ORDER — ONDANSETRON 2 MG/ML
INJECTION INTRAMUSCULAR; INTRAVENOUS PRN
Status: DISCONTINUED | OUTPATIENT
Start: 2023-03-23 | End: 2023-03-23

## 2023-03-23 RX ORDER — SODIUM CHLORIDE, SODIUM LACTATE, POTASSIUM CHLORIDE, CALCIUM CHLORIDE 600; 310; 30; 20 MG/100ML; MG/100ML; MG/100ML; MG/100ML
INJECTION, SOLUTION INTRAVENOUS CONTINUOUS PRN
Status: DISCONTINUED | OUTPATIENT
Start: 2023-03-23 | End: 2023-03-23

## 2023-03-23 RX ORDER — LIDOCAINE HYDROCHLORIDE 20 MG/ML
INJECTION, SOLUTION INFILTRATION; PERINEURAL PRN
Status: DISCONTINUED | OUTPATIENT
Start: 2023-03-23 | End: 2023-03-23

## 2023-03-23 RX ORDER — OXYCODONE HYDROCHLORIDE 5 MG/1
10 TABLET ORAL EVERY 4 HOURS PRN
Status: DISCONTINUED | OUTPATIENT
Start: 2023-03-23 | End: 2023-04-01 | Stop reason: HOSPADM

## 2023-03-23 RX ORDER — FENTANYL CITRATE 0.05 MG/ML
25 INJECTION, SOLUTION INTRAMUSCULAR; INTRAVENOUS EVERY 5 MIN PRN
Status: DISCONTINUED | OUTPATIENT
Start: 2023-03-23 | End: 2023-03-23 | Stop reason: HOSPADM

## 2023-03-23 RX ORDER — ACETAMINOPHEN 325 MG/1
650 TABLET ORAL EVERY 4 HOURS PRN
Status: DISCONTINUED | OUTPATIENT
Start: 2023-03-26 | End: 2023-03-29

## 2023-03-23 RX ORDER — FENTANYL CITRATE 0.05 MG/ML
50 INJECTION, SOLUTION INTRAMUSCULAR; INTRAVENOUS
Status: DISCONTINUED | OUTPATIENT
Start: 2023-03-23 | End: 2023-03-23 | Stop reason: HOSPADM

## 2023-03-23 RX ORDER — DEXMEDETOMIDINE HYDROCHLORIDE 4 UG/ML
INJECTION, SOLUTION INTRAVENOUS PRN
Status: DISCONTINUED | OUTPATIENT
Start: 2023-03-23 | End: 2023-03-23

## 2023-03-23 RX ORDER — ONDANSETRON 2 MG/ML
4 INJECTION INTRAMUSCULAR; INTRAVENOUS ONCE
Status: COMPLETED | OUTPATIENT
Start: 2023-03-23 | End: 2023-03-23

## 2023-03-23 RX ORDER — POLYETHYLENE GLYCOL 3350 17 G/17G
17 POWDER, FOR SOLUTION ORAL DAILY
Status: DISCONTINUED | OUTPATIENT
Start: 2023-03-24 | End: 2023-03-27

## 2023-03-23 RX ORDER — LOTEPREDNOL ETABONATE 5 MG/ML
1-2 SUSPENSION/ DROPS OPHTHALMIC 4 TIMES DAILY
Status: DISCONTINUED | OUTPATIENT
Start: 2023-03-23 | End: 2023-04-01 | Stop reason: HOSPADM

## 2023-03-23 RX ORDER — HYDROMORPHONE HYDROCHLORIDE 1 MG/ML
0.5 INJECTION, SOLUTION INTRAMUSCULAR; INTRAVENOUS; SUBCUTANEOUS
Status: DISCONTINUED | OUTPATIENT
Start: 2023-03-23 | End: 2023-04-01 | Stop reason: HOSPADM

## 2023-03-23 RX ORDER — PROCHLORPERAZINE MALEATE 10 MG
10 TABLET ORAL EVERY 6 HOURS PRN
Status: DISCONTINUED | OUTPATIENT
Start: 2023-03-23 | End: 2023-04-01 | Stop reason: HOSPADM

## 2023-03-23 RX ORDER — HEPARIN SODIUM 5000 [USP'U]/.5ML
5000 INJECTION, SOLUTION INTRAVENOUS; SUBCUTANEOUS EVERY 8 HOURS
Status: DISCONTINUED | OUTPATIENT
Start: 2023-03-24 | End: 2023-03-27

## 2023-03-23 RX ORDER — SODIUM CHLORIDE 9 MG/ML
INJECTION, SOLUTION INTRAVENOUS CONTINUOUS
Status: DISCONTINUED | OUTPATIENT
Start: 2023-03-23 | End: 2023-03-25

## 2023-03-23 RX ORDER — DEXAMETHASONE SODIUM PHOSPHATE 4 MG/ML
INJECTION, SOLUTION INTRA-ARTICULAR; INTRALESIONAL; INTRAMUSCULAR; INTRAVENOUS; SOFT TISSUE PRN
Status: DISCONTINUED | OUTPATIENT
Start: 2023-03-23 | End: 2023-03-23

## 2023-03-23 RX ORDER — SODIUM CHLORIDE, SODIUM LACTATE, POTASSIUM CHLORIDE, CALCIUM CHLORIDE 600; 310; 30; 20 MG/100ML; MG/100ML; MG/100ML; MG/100ML
INJECTION, SOLUTION INTRAVENOUS CONTINUOUS
Status: DISCONTINUED | OUTPATIENT
Start: 2023-03-23 | End: 2023-03-23 | Stop reason: HOSPADM

## 2023-03-23 RX ORDER — HYDROMORPHONE HYDROCHLORIDE 1 MG/ML
0.3 INJECTION, SOLUTION INTRAMUSCULAR; INTRAVENOUS; SUBCUTANEOUS
Status: DISCONTINUED | OUTPATIENT
Start: 2023-03-23 | End: 2023-04-01 | Stop reason: HOSPADM

## 2023-03-23 RX ORDER — LIDOCAINE HYDROCHLORIDE 10 MG/ML
INJECTION, SOLUTION EPIDURAL; INFILTRATION; INTRACAUDAL; PERINEURAL
Status: DISCONTINUED
Start: 2023-03-23 | End: 2023-03-23 | Stop reason: HOSPADM

## 2023-03-23 RX ORDER — HYDROMORPHONE HCL IN WATER/PF 6 MG/30 ML
0.4 PATIENT CONTROLLED ANALGESIA SYRINGE INTRAVENOUS EVERY 5 MIN PRN
Status: DISCONTINUED | OUTPATIENT
Start: 2023-03-23 | End: 2023-03-23 | Stop reason: HOSPADM

## 2023-03-23 RX ORDER — ACETAMINOPHEN 325 MG/1
975 TABLET ORAL EVERY 8 HOURS
Status: COMPLETED | OUTPATIENT
Start: 2023-03-23 | End: 2023-03-26

## 2023-03-23 RX ORDER — LIDOCAINE 40 MG/G
CREAM TOPICAL
Status: DISCONTINUED | OUTPATIENT
Start: 2023-03-23 | End: 2023-04-01 | Stop reason: HOSPADM

## 2023-03-23 RX ORDER — FENTANYL CITRATE 50 UG/ML
INJECTION, SOLUTION INTRAMUSCULAR; INTRAVENOUS PRN
Status: DISCONTINUED | OUTPATIENT
Start: 2023-03-23 | End: 2023-03-23

## 2023-03-23 RX ORDER — ONDANSETRON 2 MG/ML
4 INJECTION INTRAMUSCULAR; INTRAVENOUS EVERY 6 HOURS PRN
Status: DISCONTINUED | OUTPATIENT
Start: 2023-03-24 | End: 2023-04-01 | Stop reason: HOSPADM

## 2023-03-23 RX ORDER — MAGNESIUM HYDROXIDE 1200 MG/15ML
LIQUID ORAL PRN
Status: DISCONTINUED | OUTPATIENT
Start: 2023-03-23 | End: 2023-03-23 | Stop reason: HOSPADM

## 2023-03-23 RX ORDER — IOPAMIDOL 755 MG/ML
116 INJECTION, SOLUTION INTRAVASCULAR ONCE
Status: COMPLETED | OUTPATIENT
Start: 2023-03-23 | End: 2023-03-23

## 2023-03-23 RX ORDER — DIPHENHYDRAMINE HCL 25 MG
25 CAPSULE ORAL EVERY 6 HOURS PRN
Status: DISCONTINUED | OUTPATIENT
Start: 2023-03-23 | End: 2023-04-01 | Stop reason: HOSPADM

## 2023-03-23 RX ORDER — PIPERACILLIN SODIUM, TAZOBACTAM SODIUM 3; .375 G/15ML; G/15ML
3.38 INJECTION, POWDER, LYOPHILIZED, FOR SOLUTION INTRAVENOUS EVERY 6 HOURS
Status: DISCONTINUED | OUTPATIENT
Start: 2023-03-23 | End: 2023-04-01 | Stop reason: HOSPADM

## 2023-03-23 RX ORDER — NICOTINE POLACRILEX 4 MG
15-30 LOZENGE BUCCAL
Status: DISCONTINUED | OUTPATIENT
Start: 2023-03-23 | End: 2023-04-01 | Stop reason: HOSPADM

## 2023-03-23 RX ORDER — AMLODIPINE BESYLATE 10 MG/1
10 TABLET ORAL DAILY
Status: DISCONTINUED | OUTPATIENT
Start: 2023-03-24 | End: 2023-04-01 | Stop reason: HOSPADM

## 2023-03-23 RX ORDER — KETOROLAC TROMETHAMINE 30 MG/ML
INJECTION, SOLUTION INTRAMUSCULAR; INTRAVENOUS PRN
Status: DISCONTINUED | OUTPATIENT
Start: 2023-03-23 | End: 2023-03-23

## 2023-03-23 RX ORDER — PIPERACILLIN SODIUM, TAZOBACTAM SODIUM 4; .5 G/20ML; G/20ML
4.5 INJECTION, POWDER, LYOPHILIZED, FOR SOLUTION INTRAVENOUS ONCE
Status: COMPLETED | OUTPATIENT
Start: 2023-03-23 | End: 2023-03-23

## 2023-03-23 RX ORDER — HYDROMORPHONE HCL IN WATER/PF 6 MG/30 ML
0.2 PATIENT CONTROLLED ANALGESIA SYRINGE INTRAVENOUS EVERY 5 MIN PRN
Status: DISCONTINUED | OUTPATIENT
Start: 2023-03-23 | End: 2023-03-23 | Stop reason: HOSPADM

## 2023-03-23 RX ORDER — ONDANSETRON 4 MG/1
4 TABLET, ORALLY DISINTEGRATING ORAL EVERY 6 HOURS PRN
Status: DISCONTINUED | OUTPATIENT
Start: 2023-03-24 | End: 2023-04-01 | Stop reason: HOSPADM

## 2023-03-23 RX ORDER — ONDANSETRON 2 MG/ML
4 INJECTION INTRAMUSCULAR; INTRAVENOUS EVERY 30 MIN PRN
Status: DISCONTINUED | OUTPATIENT
Start: 2023-03-23 | End: 2023-03-23 | Stop reason: HOSPADM

## 2023-03-23 RX ADMIN — ONDANSETRON 4 MG: 2 INJECTION INTRAMUSCULAR; INTRAVENOUS at 13:26

## 2023-03-23 RX ADMIN — FAMOTIDINE 20 MG: 10 INJECTION INTRAVENOUS at 22:19

## 2023-03-23 RX ADMIN — ACETAMINOPHEN 975 MG: 325 TABLET, FILM COATED ORAL at 22:37

## 2023-03-23 RX ADMIN — ONDANSETRON 4 MG: 2 INJECTION INTRAMUSCULAR; INTRAVENOUS at 18:25

## 2023-03-23 RX ADMIN — PHENYLEPHRINE HYDROCHLORIDE 200 MCG: 10 INJECTION INTRAVENOUS at 18:01

## 2023-03-23 RX ADMIN — SUGAMMADEX 200 MG: 100 INJECTION, SOLUTION INTRAVENOUS at 18:45

## 2023-03-23 RX ADMIN — DEXMEDETOMIDINE HYDROCHLORIDE 12 MCG: 200 INJECTION INTRAVENOUS at 19:08

## 2023-03-23 RX ADMIN — PHENYLEPHRINE HYDROCHLORIDE 100 MCG: 10 INJECTION INTRAVENOUS at 17:54

## 2023-03-23 RX ADMIN — FENTANYL CITRATE 100 MCG: 50 INJECTION, SOLUTION INTRAMUSCULAR; INTRAVENOUS at 17:46

## 2023-03-23 RX ADMIN — PHENYLEPHRINE HYDROCHLORIDE 100 MCG: 10 INJECTION INTRAVENOUS at 18:46

## 2023-03-23 RX ADMIN — PHENYLEPHRINE HYDROCHLORIDE 100 MCG: 10 INJECTION INTRAVENOUS at 17:56

## 2023-03-23 RX ADMIN — SODIUM CHLORIDE 1000 ML: 9 INJECTION, SOLUTION INTRAVENOUS at 16:47

## 2023-03-23 RX ADMIN — PIPERACILLIN AND TAZOBACTAM 3.38 G: 3; .375 INJECTION, POWDER, FOR SOLUTION INTRAVENOUS at 22:19

## 2023-03-23 RX ADMIN — SODIUM CHLORIDE 1000 ML: 9 INJECTION, SOLUTION INTRAVENOUS at 13:27

## 2023-03-23 RX ADMIN — SUCCINYLCHOLINE CHLORIDE 100 MG: 20 INJECTION, SOLUTION INTRAMUSCULAR; INTRAVENOUS; PARENTERAL at 17:46

## 2023-03-23 RX ADMIN — IOPAMIDOL 116 ML: 755 INJECTION, SOLUTION INTRAVENOUS at 14:12

## 2023-03-23 RX ADMIN — KETOROLAC TROMETHAMINE 15 MG: 30 INJECTION, SOLUTION INTRAMUSCULAR at 17:56

## 2023-03-23 RX ADMIN — PHENYLEPHRINE HYDROCHLORIDE 100 MCG: 10 INJECTION INTRAVENOUS at 17:58

## 2023-03-23 RX ADMIN — DEXAMETHASONE SODIUM PHOSPHATE 4 MG: 4 INJECTION, SOLUTION INTRA-ARTICULAR; INTRALESIONAL; INTRAMUSCULAR; INTRAVENOUS; SOFT TISSUE at 17:57

## 2023-03-23 RX ADMIN — LOTEPREDNOL ETABONATE 2 DROP: 5 SUSPENSION/ DROPS OPHTHALMIC at 22:36

## 2023-03-23 RX ADMIN — SODIUM CHLORIDE 73 ML: 9 INJECTION, SOLUTION INTRAVENOUS at 14:12

## 2023-03-23 RX ADMIN — SODIUM CHLORIDE, POTASSIUM CHLORIDE, SODIUM LACTATE AND CALCIUM CHLORIDE: 600; 310; 30; 20 INJECTION, SOLUTION INTRAVENOUS at 17:42

## 2023-03-23 RX ADMIN — HYDROMORPHONE HYDROCHLORIDE 0.5 MG: 1 INJECTION, SOLUTION INTRAMUSCULAR; INTRAVENOUS; SUBCUTANEOUS at 18:16

## 2023-03-23 RX ADMIN — DEXMEDETOMIDINE HYDROCHLORIDE 8 MCG: 200 INJECTION INTRAVENOUS at 19:01

## 2023-03-23 RX ADMIN — PHENYLEPHRINE HYDROCHLORIDE 200 MCG: 10 INJECTION INTRAVENOUS at 18:28

## 2023-03-23 RX ADMIN — LIDOCAINE HYDROCHLORIDE 100 MG: 20 INJECTION, SOLUTION INFILTRATION; PERINEURAL at 17:46

## 2023-03-23 RX ADMIN — PHENYLEPHRINE HYDROCHLORIDE 100 MCG: 10 INJECTION INTRAVENOUS at 18:37

## 2023-03-23 RX ADMIN — PHENYLEPHRINE HYDROCHLORIDE 100 MCG: 10 INJECTION INTRAVENOUS at 17:46

## 2023-03-23 RX ADMIN — PROPOFOL 200 MG: 10 INJECTION, EMULSION INTRAVENOUS at 17:46

## 2023-03-23 RX ADMIN — PHENYLEPHRINE HYDROCHLORIDE 100 MCG: 10 INJECTION INTRAVENOUS at 18:35

## 2023-03-23 RX ADMIN — SODIUM CHLORIDE: 9 INJECTION, SOLUTION INTRAVENOUS at 21:32

## 2023-03-23 RX ADMIN — MIDAZOLAM 2 MG: 1 INJECTION INTRAMUSCULAR; INTRAVENOUS at 17:42

## 2023-03-23 RX ADMIN — ROCURONIUM BROMIDE 40 MG: 50 INJECTION, SOLUTION INTRAVENOUS at 17:57

## 2023-03-23 RX ADMIN — OXYCODONE HYDROCHLORIDE 10 MG: 5 TABLET ORAL at 13:26

## 2023-03-23 RX ADMIN — SENNOSIDES AND DOCUSATE SODIUM 1 TABLET: 50; 8.6 TABLET ORAL at 21:41

## 2023-03-23 RX ADMIN — PIPERACILLIN AND TAZOBACTAM 4.5 G: 4; .5 INJECTION, POWDER, FOR SOLUTION INTRAVENOUS at 16:47

## 2023-03-23 ASSESSMENT — ENCOUNTER SYMPTOMS
CHILLS: 1
VOMITING: 0
COUGH: 0
SORE THROAT: 0
FEVER: 1
ABDOMINAL PAIN: 1
SHORTNESS OF BREATH: 0

## 2023-03-23 ASSESSMENT — ACTIVITIES OF DAILY LIVING (ADL)
ADLS_ACUITY_SCORE: 35

## 2023-03-23 ASSESSMENT — COPD QUESTIONNAIRES: COPD: 0

## 2023-03-23 ASSESSMENT — LIFESTYLE VARIABLES: TOBACCO_USE: 1

## 2023-03-23 NOTE — OP NOTE
General Surgery Operative Note    PREOPERATIVE DIAGNOSIS:  Perforated appendicitis [K35.32]    POSTOPERATIVE DIAGNOSIS:  Same    PROCEDURE:   Procedure(s):  APPENDECTOMY, LAPAROSCOPIC    ANESTHESIA:  General.    PREOPERATIVE MEDICATIONS:  Zosyn IV.    SURGEON:  Nabeel Zabala MD    ASSISTANT:  Jennifer Woody PA-C    INDICATIONS:  Appendicitis    PROCEDURE:  Patient was taken to the operating suite and uneventfully endotracheally intubated.  Abdomen was prepped and draped in a sterile fashion.  Surgeon initiated timeout was acknowledged.  A small skin incision was made left upper quadrant.  We entered the abdomen using the Visiport technique.  Two other trocars were placed in the usual positions under laparoscopic visualization.  There was diffuse purulent peritonitis throughout the abdomen.  Bowel loops were inflamed and injected.  Using 2 long graspers, we were able to identify the cecum and the appendix was identified near the ileocecal valve.  The appendix was inflamed and hyperemic with a perforation near the base.   We were able to grasp the appendix and elevate it up toward the anterior abdominal wall.  Using a combination of sharp and blunt dissection, we were able to create a window between the appendix and the mesoappendix near its base.  We then fired a 45 mm blue load Endo-HEATHER stapler across the appendix at its base.  This appeared to be intact.  Following this, we used the Ligasure device across the mesoappendix, freeing the appendix from the cecum.  Appendix was placed in an Endocatch bag and removed from the abdomen.  We again inspected our staple lines and these were all found to be intact and hemostatic.  We irrigated the abdomen with 3 liters of saline and placed a drain in the pelvis.  I removed the umbilical port trocar and reapproximated the fascia with a figure-of-eight 0 Vicryl suture using the Pedro-Ayah device.  We then desufflated the abdomen using the [place] suction  and the  trocars were removed.  The skin edges were reapproximated using 4-0 Vicryl and Steri-Strips.  Band-Aids were placed and the patient was awakened.  The patient was uneventfully extubated and taken to PACU in stable condition.  At the conclusion of the case, all lap and needle counts were correct.      ESTIMATED BLOOD LOSS:  5 mL    INTRAOPERATIVE FINDINGS:  Perforated appendicitis with purulent peritonitis.    Nabeel Zabala MD, MD

## 2023-03-23 NOTE — ED TRIAGE NOTES
Pt complains of severe worsening abdominal pain that started last night.  Pt describes this pain as spasms and states it is migrating down on his abdomen.     Triage Assessment     Row Name 03/23/23 1312       Triage Assessment (Adult)    Airway WDL WDL       Respiratory WDL    Respiratory WDL WDL       Skin Circulation/Temperature WDL    Skin Circulation/Temperature WDL WDL       Cardiac WDL    Cardiac WDL WDL       Peripheral/Neurovascular WDL    Peripheral Neurovascular WDL WDL       Cognitive/Neuro/Behavioral WDL    Cognitive/Neuro/Behavioral WDL WDL

## 2023-03-23 NOTE — ANESTHESIA PROCEDURE NOTES
Airway       Patient location during procedure: OR       Procedure Start/Stop Times: 3/23/2023 5:48 PM  Staff -        Anesthesiologist:  Mikey Bess MD       CRNA: Roberta Novoa APRN CRNA       Performed By: CRNAIndications and Patient Condition       Indications for airway management: lynn-procedural       Induction type:RSI       Mask difficulty assessment: 0 - not attempted    Final Airway Details       Final airway type: endotracheal airway       Successful airway: ETT - single  Endotracheal Airway Details        ETT size (mm): 8.0       Cuffed: yes       Successful intubation technique: video laryngoscopy       VL Blade Size: Rocha 4       Grade View of Cords: 1       Adjucts: stylet       Position: Right       Measured from: lips       Secured at (cm): 22       Bite block used: None    Post intubation assessment        Placement verified by: capnometry, equal breath sounds and chest rise        Number of attempts at approach: 1       Secured with: pink tape       Ease of procedure: easy       Dentition: Intact and Unchanged    Medication(s) Administered   Medication Administration Time: 3/23/2023 5:48 PM

## 2023-03-23 NOTE — ED NOTES
PIT/Triage Evaluation    Patient presented with abdominal pain. Started upper abdominal pain last night. Now its lower abdominal pain in center. Spasm like. Reports decreased UOP. No blood in urine. No flank pain. Fever since last night. No vomiting, nausea, diarrhea. Last BM was yesterday (minimal stool output though). No prior abdominal surgeries.      Exam is notable for:  Moderate lower midline and luq ttp.     Appropriate interventions for symptom management were initiated if applicable.  Appropriate diagnostic tests were initiated if indicated.    Important information for subsequent clinician:    I briefly evaluated the patient and developed an initial plan of care. I discussed this plan and explained that this brief interaction does not constitute a full evaluation. Patient/family understands that they should wait to be fully evaluated and discuss any test results with another clinician prior to leaving the hospital.       Jimi Vogel MD  03/23/23 0426

## 2023-03-23 NOTE — H&P
[facility]  [facility] - H&P     Efrain Lock MRN# [id number]   Age: 47 year old YOB: 1975     HPI:  Efrain Lock is a 47 year old male with a history of HTN and DM2 who presents with abdominal pain. The patient complains of a day of central lower abdominal pain that started suddenly yesterday while at the gym. The pain migrated down to his lower abdomen and has been associated with fevers and chills. He has also noticed some difficulty urinating due to the discomfort. Patient denies any nausea, vomiting, chest pain, shortness of breath. No previous episodes of this. He has no history of abdominal surgery. He denies any personal or family history of bleeding disorder, clotting disorder, anesthesia reaction. He does endorse a history of sleep apnea for which he occasionally uses CPAP. Patient is a previous operating room nurse. History is obtained from the patient,    PMH:  Past Medical History:   Diagnosis Date     HTN (hypertension)      Microalbuminuria 10/30/2013     Tobacco abuse 7/12/2012       PSH:  Past Surgical History:   Procedure Laterality Date     REPAIR ECTROPION Right 12/3/2020    Procedure: Right lower eyelid ectropion repair;  Surgeon: Oanh Donnelly MD;  Location: [facility] [place] OR     REPAIR PTOSIS       SURGICAL HISTORY OF -       rt knee surgery     WEDGE RESECTION EYELID Right 12/3/2020    Procedure: Right upper eyelid pentagonal wedge;  Surgeon: Oanh Donnelly MD;  Location: [facility] [place] OR       Allergies:  Allergies   Allergen Reactions     Lisinopril Cough       Home Medications:  Current Outpatient Medications   Medication Sig Dispense Refill     amLODIPine (NORVASC) 10 MG tablet Take 1 tablet (10 mg) by mouth daily 90 tablet 3     atorvastatin (LIPITOR) 20 MG tablet Take 1 tablet (20 mg) by mouth daily 90 tablet 1     losartan (COZAAR) 100 MG tablet Take 1 tablet (100 mg) by mouth daily 90 tablet 3     loteprednol (LOTEMAX) 0.5 % ophthalmic suspension  Place 1-2 drops into the right eye 4 times daily 10 mL 3     metFORMIN (GLUCOPHAGE) 500 MG tablet Take 1 tablet (500 mg) by mouth 2 times daily (with meals) 180 tablet 1     Multiple Vitamins-Minerals (MULTIVITAMIN ADULT PO) Take 1 tablet by mouth daily       Semaglutide, 1 MG/DOSE, (OZEMPIC, 1 MG/DOSE,) 4 MG/3ML SOPN Inject 1 mg Subcutaneous once a week 3 mL 4       Social History:  Social History     Tobacco Use     Smoking status: Every Day     Packs/day: 0.30     Types: Cigarettes     Smokeless tobacco: Never   Substance Use Topics     Alcohol use: No     Alcohol/week: 0.0 standard drinks     Drug use: No       Family History:  No family history chronic diarrhea, inflammatory bowel disease or colon cancer.  No previous abdominal surgeries    Objective:  BP (!) 192/100   Pulse (!) 129   Temp 99.8  F (37.7  C) (Temporal)   Resp 18   Wt 104.8 kg (231 lb)   SpO2 99%   BMI 38.44 kg/m      General: healthy, alert and awake, appears mildly uncomfortable. Oriented x 3  Skin:  Warm, dry  Cardiovascular: regular rhythm, tachycardic (rate 120). No obvious murmurs  Respiratory: lungs clear to auscultation bilaterally without wheezes, rales or rhonchi   Abdomen: rounded with some guarding. Tenderness to palpation across low abdomen, worst over RLQ. No surgical scars noted. No palpable masses or organomegaly  Extremities: no lower extremity edema, no calf tenderness    Labs Reviewed:  Recent Labs     03/23/23  1319   HGB 15.7   WBC 9.7       CT Abdomen / Pelvis  FINDINGS:   LOWER CHEST: Unremarkable.     HEPATOBILIARY: Normal.     PANCREAS: Normal.     SPLEEN: Normal.     ADRENAL GLANDS: Normal.     KIDNEYS/BLADDER: Likely small bilateral renal cysts, no specific  follow-up recommended. No hydronephrosis. Urinary bladder is  unremarkable.     BOWEL: Dilated appendix with surrounding inflammation as well as focal  defect of the appendiceal wall near the base (series 3 image 143,  series 5 image 43). There is evidence of  peritonitis with dilated  distal small bowel without a distinct transition point visualized. The  colon is decompressed. No walled off, drainable fluid collection at  this time.     PELVIC ORGANS: Normal.     ADDITIONAL FINDINGS: None.     MUSCULOSKELETAL: No acute bony abnormality.                                                                      IMPRESSION:   1.  Perforated appendicitis with secondary findings of peritonitis. No  drainable fluid collection this time.  2.  Mildly dilated distal small bowel without distinct transition  point visualized, favor adynamic ileus secondary to #1 although  developing mechanical obstruction is not excluded. Recommend surgical  Consultation.            ASSESSMENT/PLAN:  The patient's history, physical exam, laboratory and imaging studies are convincing for acute appendicitis with perforation. There is no evidence of abscess. Options were discussed with the patient including operative vs nonoperative management with hospitalization and antibiotics.  I have offered the patient laparoscopic appendectomy. Risks, benefits, potential complications, and expected recovery were discussed in detail. All of the patient's questions have been answered.  He elects to proceed with surgery today and we will go to the OR at the soonest availability. He has received 4.5g Zosyn. Continue NPO, IV fluids, pain meds and anti-emetics available prn. Anticipate patient will remain in hospital at least until tomorrow, possibly longer pending intraoperative findings and postoperative recovery.    Jennifer Woody PA-C with Nabeel Zabala MD  [facility]  [phone]

## 2023-03-23 NOTE — PHARMACY-ADMISSION MEDICATION HISTORY
Pharmacy Medication History  Admission medication history interview status for the 3/23/2023  admission is complete. See EPIC admission navigator for prior to admission medications     Location of Interview: Patient room  Medication history sources: Patient and Surescripts    Significant changes made to the medication list:  Removed hydrocortisone cream     In the past week, patient estimated taking medication this percent of the time: greater than 90%    Medication Affordability:  Not including over the counter (OTC) medications, was there a time in the past 12 months when you did not take your medications as prescribed because of cost?: No    Additional medication history information:   None    Medication reconciliation completed by provider prior to medication history? No    Time spent in this activity: 10 minutes    Prior to Admission medications    Medication Sig Last Dose Taking? Auth Provider Long Term End Date   amLODIPine (NORVASC) 10 MG tablet Take 1 tablet (10 mg) by mouth daily 3/22/2023 Yes Fabrizio Quach MD Yes    atorvastatin (LIPITOR) 20 MG tablet Take 1 tablet (20 mg) by mouth daily 3/22/2023 Yes Fabrizio Quach MD Yes    losartan (COZAAR) 100 MG tablet Take 1 tablet (100 mg) by mouth daily 3/22/2023 Yes Fabrizio Quach MD Yes    loteprednol (LOTEMAX) 0.5 % ophthalmic suspension Place 1-2 drops into the right eye 4 times daily 3/22/2023 Yes Dusty Mladonado MD     metFORMIN (GLUCOPHAGE) 500 MG tablet Take 1 tablet (500 mg) by mouth 2 times daily (with meals) 3/22/2023 Yes Henry Cesar MD Yes    Multiple Vitamins-Minerals (MULTIVITAMIN ADULT PO) Take 1 tablet by mouth daily 3/22/2023 Yes Reported, Patient     Semaglutide, 1 MG/DOSE, (OZEMPIC, 1 MG/DOSE,) 4 MG/3ML SOPN Inject 1 mg Subcutaneous once a week 3/19/2023 Yes Henry Cesar MD         The information provided in this note is only as accurate as the sources available at the time of update(s)

## 2023-03-23 NOTE — ANESTHESIA PREPROCEDURE EVALUATION
Anesthesia Pre-Procedure Evaluation    Patient: Efrain Lock   MRN: [id number] : 1975        Procedure : Procedure(s):  APPENDECTOMY, LAPAROSCOPIC          Past Medical History:   Diagnosis Date     HTN (hypertension)      Microalbuminuria 10/30/2013     Tobacco abuse 2012      Past Surgical History:   Procedure Laterality Date     REPAIR ECTROPION Right 12/3/2020    Procedure: Right lower eyelid ectropion repair;  Surgeon: Oanh Donnelly MD;  Location: [facility] [place]     REPAIR PTOSIS       SURGICAL HISTORY OF -       rt knee surgery     WEDGE RESECTION EYELID Right 12/3/2020    Procedure: Right upper eyelid pentagonal wedge;  Surgeon: Oanh Donnelly MD;  Location: [facility] OR      Allergies   Allergen Reactions     Lisinopril Cough      Social History     Tobacco Use     Smoking status: Every Day     Packs/day: 0.30     Types: Cigarettes     Smokeless tobacco: Never   Substance Use Topics     Alcohol use: No     Alcohol/week: 0.0 standard drinks      Wt Readings from Last 1 Encounters:   23 104.8 kg (231 lb)        Anesthesia Evaluation   Pt has had prior anesthetic. Type: General.    No history of anesthetic complications       ROS/MED HX  ENT/Pulmonary:     (+) sleep apnea, tobacco use, Current use,  (-) asthma, COPD and vocal abnormalities   Neurologic:  - neg neurologic ROS     Cardiovascular:     (+) hypertension----- (-) CAD and murmur   METS/Exercise Tolerance: >4 METS    Hematologic:  - neg hematologic  ROS     Musculoskeletal:       GI/Hepatic:    (-) GERD and liver disease   Renal/Genitourinary:    (-) renal disease   Endo:     (+) type II DM, Obesity,  (-) Type I DM   Psychiatric/Substance Use:       Infectious Disease:       Malignancy:       Other:            Physical Exam    Airway        Mallampati: III   TM distance: > 3 FB   Neck ROM: full   Mouth opening: > 3 cm    Respiratory Devices and Support         Dental     Comment: Fully edentulous on upper teeth, missing several lower  teeth in the molar region, intact teeth are not loose per patient.    (+) Multiple visibly decayed, broken teeth and Removable bridges or other hardware      Cardiovascular          Rhythm and rate: regular and tachycardia (-) no murmur    Pulmonary           breath sounds clear to auscultation           OUTSIDE LABS:  CBC:   Lab Results   Component Value Date    WBC 9.7 03/23/2023    HGB 15.7 03/23/2023    HGB 15.3 02/28/2020    HCT 47.6 03/23/2023     03/23/2023     BMP:   Lab Results   Component Value Date     (L) 03/23/2023     01/04/2023    POTASSIUM 3.6 03/23/2023    POTASSIUM 3.5 01/04/2023    CHLORIDE 97 (L) 03/23/2023    CHLORIDE 105 01/04/2023    CO2 24 03/23/2023    CO2 20 (L) 01/04/2023    BUN 13.1 03/23/2023    BUN 17.6 01/04/2023    CR 1.56 (H) 03/23/2023    CR 1.29 (H) 01/04/2023     (H) 03/23/2023    GLC 99 01/04/2023     COAGS: No results found for: PTT, INR, FIBR  POC: No results found for: BGM, HCG, HCGS  HEPATIC:   Lab Results   Component Value Date    ALBUMIN 4.6 03/23/2023    PROTTOTAL 8.1 03/23/2023    ALT 39 03/23/2023    AST 25 03/23/2023    ALKPHOS 148 (H) 03/23/2023    BILITOTAL 0.8 03/23/2023     OTHER:   Lab Results   Component Value Date    A1C 6.5 (H) 01/04/2023    SUMAYA 9.7 03/23/2023    LIPASE 37 03/23/2023    TSH 1.86 02/28/2020       Anesthesia Plan    ASA Status:  3, emergent    NPO Status:  ELEVATED Aspiration Risk/Unknown    Anesthesia Type: General.     - Airway: ETT   Induction: Intravenous, RSI.   Maintenance: Inhalation.        Consents    Anesthesia Plan(s) and associated risks, benefits, and realistic alternatives discussed. Questions answered and patient/representative(s) expressed understanding.    - Discussed:     - Discussed with:  Patient      - Extended Intubation/Ventilatory Support Discussed: No.      - Patient is DNR/DNI Status: No    Use of blood products discussed: Yes.     - Discussed with: Patient.     - Consented: consented to blood  products            Reason for refusal: other.     Postoperative Care    Pain management: IV analgesics, Multi-modal analgesia.     - Plan for long acting post-op opioid use   PONV prophylaxis: Ondansetron (or other 5HT-3), Dexamethasone or Solumedrol     Comments:                Mikey Bess MD

## 2023-03-23 NOTE — ED PROVIDER NOTES
History     Chief Complaint:  Abdominal Pain    The history is provided by the patient.      Efrain Lock is a 47 year old male with a history of type II diabetes, hypertension, hyperlipidemia, and tobacco abuse who presents with abdominal pain. The patient complains of a day of central lower abdominal pain and associated fever and chills. This has never happened before. He last urinated 0600 today. He last drank fluids about ten minutes ago, he had about half a bottle of water. He has not eaten since yesterday evening. Patient denies any vomiting, sore throat, cough, chest pain, shortness of breath, recent Covid exposures, or history of abdominal surgeries.     Independent Historian:   None - Patient Only    Review of External Notes: Reviewed note from 10/28/2022 Dr. Quach being seen for new onset type 2 diabetes.  Started metformin.    ROS:  Review of Systems   Constitutional: Positive for chills and fever.   HENT: Negative for sore throat.    Respiratory: Negative for cough and shortness of breath.    Cardiovascular: Negative for chest pain.   Gastrointestinal: Positive for abdominal pain. Negative for vomiting.   All other systems reviewed and are negative.      Allergies:  Lisinopril     Medications:    norvasc  lipitor  Cozaar  glucophage  maxitrol    Past Medical History:    Hypertension  Microalbuminuria  Tobacco abuse   Hyperlipidemia  Insomnia  Obesity   Type II diabetes     Past Surgical History:    Repair ectropion  Repair ptosis  Right knee surgery, unspecified  Wedge resection eyelid     Family History:    family history includes Hypertension in his paternal grandmother; Macular Degeneration in his father; Prostate Cancer (age of onset: 80) in his father.    Social History:   reports that he has been smoking cigarettes. He has been smoking an average of .3 packs per day. He has never used smokeless tobacco. He reports that he does not drink alcohol and does not use drugs.  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   Presents with family.     Physical Exam     Patient Vitals for the past 24 hrs:   BP Temp Temp src Pulse Resp SpO2 Weight   03/23/23 2139 -- -- -- -- -- 96 % --   03/23/23 2127 95/63 97.9  F (36.6  C) Axillary 87 16 95 % --   03/23/23 2046 110/66 -- -- 87 16 -- 107.8 kg (237 lb 10.5 oz)   03/23/23 2020 109/67 97.2  F (36.2  C) Temporal 86 16 94 % --   03/23/23 2010 106/66 -- -- 89 15 95 % --   03/23/23 2000 109/68 -- -- 87 14 94 % --   03/23/23 1950 96/64 97  F (36.1  C) Temporal 90 15 95 % --   03/23/23 1940 91/61 -- -- 92 18 94 % --   03/23/23 1930 97/60 -- -- 97 20 95 % --   03/23/23 1925 101/58 -- -- 92 (!) 9 93 % --   03/23/23 1910 105/71 99.5  F (37.5  C) Temporal 94 10 99 % --   03/23/23 1730 (!) 142/119 (!) 104.2  F (40.1  C) Temporal -- 20 97 % --   03/23/23 1644 123/64 (!) 102.1  F (38.9  C) Oral 119 20 97 % --   03/23/23 1311 (!) 192/100 99.8  F (37.7  C) Temporal (!) 129 18 99 % 104.8 kg (231 lb)        Physical Exam    Physical Exam   Constitutional:  Patient is oriented to person, place, and time. They appear well-developed and well-nourished.     HENT:   Mouth/Throat:   Wearing facemask  Eyes:    Conjunctivae normal and EOM are normal. Pupils are equal, round, and reactive to light.   Neck:    Normal range of motion.   Cardiovascular: Normal rate, regular rhythm and normal heart sounds.  Exam reveals no gallop and no friction rub.  No murmur heard.  Pulmonary/Chest:  Effort normal and breath sounds normal. Patient has no wheezes. Patient has no rales.   Abdominal:   Soft. Bowel sounds are normal. Patient exhibits no mass. There is  Right lower quadrant to suprapubic  tenderness. No rebound.  Musculoskeletal:  Normal range of motion. Patient exhibits no edema.   Neurological:   Patient is alert and oriented to person, place, and time. Patient has normal strength. No cranial nerve deficit or sensory deficit. GCS 15   Skin:   Skin is warm and dry. No rash noted. No erythema.    Psychiatric:   Patient has a normal mood      Emergency Department Course     Imaging:  CT Abdomen Pelvis w Contrast   Final Result   IMPRESSION:    1.  Perforated appendicitis with secondary findings of peritonitis. No   drainable fluid collection this time.   2.  Mildly dilated distal small bowel without distinct transition   point visualized, favor adynamic ileus secondary to #1 although   developing mechanical obstruction is not excluded. Recommend surgical   consultation.      IRENE KC MD            SYSTEM ID:  [id number]         Report per radiology    Laboratory:  Labs Ordered and Resulted from Time of ED Arrival to Time of ED Departure   COMPREHENSIVE METABOLIC PANEL - Abnormal       Result Value    Sodium 133 (*)     Potassium 3.6      Chloride 97 (*)     Carbon Dioxide (CO2) 24      Anion Gap 12      Urea Nitrogen 13.1      Creatinine 1.56 (*)     Calcium 9.7      Glucose 140 (*)     Alkaline Phosphatase 148 (*)     AST 25      ALT 39      Protein Total 8.1      Albumin 4.6      Bilirubin Total 0.8      GFR Estimate 54 (*)    LIPASE - Normal    Lipase 37     CBC WITH PLATELETS AND DIFFERENTIAL    WBC Count 9.7      RBC Count 5.49      Hemoglobin 15.7      Hematocrit 47.6      MCV 87      MCH 28.6      MCHC 33.0      RDW 12.5      Platelet Count 309      % Neutrophils 82      % Lymphocytes 12      % Monocytes 6      % Eosinophils 0      % Basophils 0      % Immature Granulocytes 0      NRBCs per 100 WBC 0      Absolute Neutrophils 8.0      Absolute Lymphocytes 1.1      Absolute Monocytes 0.6      Absolute Eosinophils 0.0      Absolute Basophils 0.0      Absolute Immature Granulocytes 0.0      Absolute NRBCs 0.0     ROUTINE UA WITH MICROSCOPIC REFLEX TO CULTURE     Emergency Department Course & Assessments:       Interventions:  Medications   0.9% sodium chloride BOLUS (0 mLs Intravenous Stopped 3/23/23 6338)     Followed by   sodium chloride 0.9% infusion ( Intravenous $New Bag 3/23/23 4646)    amLODIPine (NORVASC) tablet 10 mg (has no administration in time range)   atorvastatin (LIPITOR) tablet 20 mg (has no administration in time range)   losartan (COZAAR) tablet 100 mg (has no administration in time range)   loteprednol (LOTEMAX) 0.5 % ophthalmic susp 1-2 drop (has no administration in time range)   lidocaine 1 % 0.1-1 mL (has no administration in time range)   lidocaine (LMX4) cream (has no administration in time range)   sodium chloride (PF) 0.9% PF flush 3 mL (3 mLs Intracatheter $Given 3/23/23 2133)   sodium chloride (PF) 0.9% PF flush 3 mL (has no administration in time range)   acetaminophen (TYLENOL) tablet 975 mg (has no administration in time range)   acetaminophen (TYLENOL) tablet 650 mg (has no administration in time range)   ondansetron (ZOFRAN ODT) ODT tab 4 mg (has no administration in time range)     Or   ondansetron (ZOFRAN) injection 4 mg (has no administration in time range)   prochlorperazine (COMPAZINE) injection 10 mg (has no administration in time range)     Or   prochlorperazine (COMPAZINE) tablet 10 mg (has no administration in time range)   senna-docusate (SENOKOT-S/PERICOLACE) 8.6-50 MG per tablet 1 tablet (1 tablet Oral $Given 3/23/23 2141)   polyethylene glycol (MIRALAX) Packet 17 g (has no administration in time range)   magnesium hydroxide (MILK OF MAGNESIA) suspension 30 mL (has no administration in time range)   bisacodyl (DULCOLAX) suppository 10 mg (has no administration in time range)   heparin ANTICOAGULANT injection 5,000 Units (has no administration in time range)   famotidine (PEPCID) tablet 20 mg ( Oral See Alternative 3/23/23 2219)     Or   famotidine (PEPCID) injection 20 mg (20 mg Intravenous $Given 3/23/23 2219)   benzocaine-menthol (CHLORASEPTIC) 6-10 MG lozenge 1 lozenge (has no administration in time range)   HYDROmorphone (PF) (DILAUDID) injection 0.3 mg (has no administration in time range)     Or   HYDROmorphone (PF) (DILAUDID) injection 0.5 mg (has  no administration in time range)   oxyCODONE (ROXICODONE) tablet 5 mg (has no administration in time range)     Or   oxyCODONE (ROXICODONE) tablet 10 mg (has no administration in time range)   methocarbamol (ROBAXIN) tablet 750 mg (has no administration in time range)   diphenhydrAMINE (BENADRYL) capsule 25 mg (has no administration in time range)     Or   diphenhydrAMINE (BENADRYL) injection 25 mg (has no administration in time range)   glucose gel 15-30 g (has no administration in time range)     Or   dextrose 50 % injection 25-50 mL (has no administration in time range)     Or   glucagon injection 1 mg (has no administration in time range)   insulin aspart (NovoLOG) injection (RAPID ACTING) (has no administration in time range)   insulin aspart (NovoLOG) injection (RAPID ACTING) (1 Units Subcutaneous Not Given 3/23/23 2218)   piperacillin-tazobactam (ZOSYN) 3.375 g vial to attach to  mL bag (3.375 g Intravenous $New Bag 3/23/23 2219)   naloxone (NARCAN) injection 0.2 mg (has no administration in time range)     Or   naloxone (NARCAN) injection 0.4 mg (has no administration in time range)     Or   naloxone (NARCAN) injection 0.2 mg (has no administration in time range)     Or   naloxone (NARCAN) injection 0.4 mg (has no administration in time range)   oxyCODONE (ROXICODONE) tablet 10 mg (10 mg Oral $Given 3/23/23 1326)   ondansetron (ZOFRAN) injection 4 mg (4 mg Intravenous $Given 3/23/23 1326)   iopamidol (ISOVUE-370) solution 116 mL (116 mLs Intravenous $Given 3/23/23 1412)   Saline (73 mLs Intravenous $Given 3/23/23 1412)   piperacillin-tazobactam (ZOSYN) 4.5 g vial to attach to  mL bag (4.5 g Intravenous $New Bag 3/23/23 1647)   0.9% sodium chloride BOLUS ( Intravenous Rate/Dose Verify 3/23/23 1710)        Assessments:  1600 I obtained history and examined the patient as noted above.   1638 I rechecked the patient and explained findings. Prepared for admission.     Independent Interpretation  (X-rays, CTs, rhythm strip):  Reviwed images and agree with interpretation    Consultations/Discussion of Management or Tests:  5696 I spoke with Dr. Zabala, General Surgery, regarding the patient.        Social Determinants of Health affecting care:   None    Disposition:  The patient was admitted to the hospital under the care of Dr. Zabala.     Impression & Plan      Medical Decision Making:  Efrain Lock is a 47 year old male who presents with abdominal pain and the CT scan confirms perforated appendicitis.  This is consistent with his clinical exam. His pain has been controlled with interventions in the Emergency Department.  IV antibiotics started in the Emergency Department. The case was discussed with the on-call surgeon, Dr. Zabala, and the patient will be going to the operating room emergently.  An observation bed has been arranged for after surgery.  Patient is hemodynamically stable in ED.  Questions were answered.      Diagnosis:    ICD-10-CM    1. Perforated appendicitis  K35.32 Case Request: APPENDECTOMY, LAPAROSCOPIC     Case Request: APPENDECTOMY, LAPAROSCOPIC          Scribe Disclosure:  I, Ramu Rebollar, am serving as a scribe at 4:04 PM on 3/23/2023 to document services personally performed by Delfina Luke MD based on my observations and the provider's statements to me.   3/23/2023   Delfina Luke MD Bochert, Michelle Ann, MD  03/23/23 2316

## 2023-03-24 LAB
ALBUMIN UR-MCNC: 70 MG/DL
ANION GAP SERPL CALCULATED.3IONS-SCNC: 12 MMOL/L (ref 7–15)
APPEARANCE UR: ABNORMAL
BILIRUB UR QL STRIP: NEGATIVE
BUN SERPL-MCNC: 14.9 MG/DL (ref 6–20)
CALCIUM SERPL-MCNC: 7.8 MG/DL (ref 8.6–10)
CHLORIDE SERPL-SCNC: 105 MMOL/L (ref 98–107)
COLOR UR AUTO: ABNORMAL
CREAT SERPL-MCNC: 1.56 MG/DL (ref 0.67–1.17)
DEPRECATED HCO3 PLAS-SCNC: 21 MMOL/L (ref 22–29)
ERYTHROCYTE [DISTWIDTH] IN BLOOD BY AUTOMATED COUNT: 13 % (ref 10–15)
GFR SERPL CREATININE-BSD FRML MDRD: 54 ML/MIN/1.73M2
GLUCOSE BLDC GLUCOMTR-MCNC: 108 MG/DL (ref 70–99)
GLUCOSE BLDC GLUCOMTR-MCNC: 115 MG/DL (ref 70–99)
GLUCOSE BLDC GLUCOMTR-MCNC: 131 MG/DL (ref 70–99)
GLUCOSE BLDC GLUCOMTR-MCNC: 137 MG/DL (ref 70–99)
GLUCOSE BLDC GLUCOMTR-MCNC: 140 MG/DL (ref 70–99)
GLUCOSE BLDC GLUCOMTR-MCNC: 85 MG/DL (ref 70–99)
GLUCOSE SERPL-MCNC: 141 MG/DL (ref 70–99)
GLUCOSE UR STRIP-MCNC: NEGATIVE MG/DL
HCT VFR BLD AUTO: 37.6 % (ref 40–53)
HGB BLD-MCNC: 12.4 G/DL (ref 13.3–17.7)
HGB UR QL STRIP: ABNORMAL
KETONES UR STRIP-MCNC: ABNORMAL MG/DL
LEUKOCYTE ESTERASE UR QL STRIP: ABNORMAL
MCH RBC QN AUTO: 29 PG (ref 26.5–33)
MCHC RBC AUTO-ENTMCNC: 33 G/DL (ref 31.5–36.5)
MCV RBC AUTO: 88 FL (ref 78–100)
NITRATE UR QL: NEGATIVE
PH UR STRIP: 6 [PH] (ref 5–7)
PLATELET # BLD AUTO: 216 10E3/UL (ref 150–450)
POTASSIUM SERPL-SCNC: 3.7 MMOL/L (ref 3.4–5.3)
RBC # BLD AUTO: 4.28 10E6/UL (ref 4.4–5.9)
RBC URINE: >182 /HPF
SODIUM SERPL-SCNC: 138 MMOL/L (ref 136–145)
SP GR UR STRIP: 1.02 (ref 1–1.03)
SQUAMOUS EPITHELIAL: 1 /HPF
UROBILINOGEN UR STRIP-MCNC: NORMAL MG/DL
WBC # BLD AUTO: 17.5 10E3/UL (ref 4–11)
WBC URINE: 107 /HPF

## 2023-03-24 PROCEDURE — 85027 COMPLETE CBC AUTOMATED: CPT | Performed by: PHYSICIAN ASSISTANT

## 2023-03-24 PROCEDURE — 99221 1ST HOSP IP/OBS SF/LOW 40: CPT

## 2023-03-24 PROCEDURE — 82947 ASSAY GLUCOSE BLOOD QUANT: CPT | Performed by: PHYSICIAN ASSISTANT

## 2023-03-24 PROCEDURE — 120N000001 HC R&B MED SURG/OB

## 2023-03-24 PROCEDURE — 250N000013 HC RX MED GY IP 250 OP 250 PS 637: Performed by: PHYSICIAN ASSISTANT

## 2023-03-24 PROCEDURE — 81001 URINALYSIS AUTO W/SCOPE: CPT | Performed by: EMERGENCY MEDICINE

## 2023-03-24 PROCEDURE — 250N000011 HC RX IP 250 OP 636: Performed by: PHYSICIAN ASSISTANT

## 2023-03-24 PROCEDURE — 258N000003 HC RX IP 258 OP 636: Performed by: PHYSICIAN ASSISTANT

## 2023-03-24 PROCEDURE — 87086 URINE CULTURE/COLONY COUNT: CPT | Performed by: EMERGENCY MEDICINE

## 2023-03-24 PROCEDURE — 36415 COLL VENOUS BLD VENIPUNCTURE: CPT | Performed by: PHYSICIAN ASSISTANT

## 2023-03-24 RX ADMIN — ACETAMINOPHEN 975 MG: 325 TABLET, FILM COATED ORAL at 14:41

## 2023-03-24 RX ADMIN — PIPERACILLIN AND TAZOBACTAM 3.38 G: 3; .375 INJECTION, POWDER, FOR SOLUTION INTRAVENOUS at 05:41

## 2023-03-24 RX ADMIN — FAMOTIDINE 20 MG: 20 TABLET ORAL at 19:35

## 2023-03-24 RX ADMIN — POLYETHYLENE GLYCOL 3350 17 G: 17 POWDER, FOR SOLUTION ORAL at 08:41

## 2023-03-24 RX ADMIN — LOTEPREDNOL ETABONATE 1 DROP: 5 SUSPENSION/ DROPS OPHTHALMIC at 08:41

## 2023-03-24 RX ADMIN — OXYCODONE HYDROCHLORIDE 5 MG: 5 TABLET ORAL at 17:11

## 2023-03-24 RX ADMIN — HEPARIN SODIUM 5000 UNITS: 5000 INJECTION, SOLUTION INTRAVENOUS; SUBCUTANEOUS at 14:41

## 2023-03-24 RX ADMIN — SODIUM CHLORIDE: 9 INJECTION, SOLUTION INTRAVENOUS at 10:54

## 2023-03-24 RX ADMIN — LOTEPREDNOL ETABONATE 2 DROP: 5 SUSPENSION/ DROPS OPHTHALMIC at 19:35

## 2023-03-24 RX ADMIN — ACETAMINOPHEN 975 MG: 325 TABLET, FILM COATED ORAL at 23:04

## 2023-03-24 RX ADMIN — PIPERACILLIN AND TAZOBACTAM 3.38 G: 3; .375 INJECTION, POWDER, FOR SOLUTION INTRAVENOUS at 23:05

## 2023-03-24 RX ADMIN — SENNOSIDES AND DOCUSATE SODIUM 1 TABLET: 50; 8.6 TABLET ORAL at 08:41

## 2023-03-24 RX ADMIN — AMLODIPINE BESYLATE 10 MG: 10 TABLET ORAL at 08:42

## 2023-03-24 RX ADMIN — ACETAMINOPHEN 975 MG: 325 TABLET, FILM COATED ORAL at 05:41

## 2023-03-24 RX ADMIN — LOTEPREDNOL ETABONATE 2 DROP: 5 SUSPENSION/ DROPS OPHTHALMIC at 12:34

## 2023-03-24 RX ADMIN — OXYCODONE HYDROCHLORIDE 10 MG: 5 TABLET ORAL at 04:55

## 2023-03-24 RX ADMIN — OXYCODONE HYDROCHLORIDE 10 MG: 5 TABLET ORAL at 08:48

## 2023-03-24 RX ADMIN — SENNOSIDES AND DOCUSATE SODIUM 1 TABLET: 50; 8.6 TABLET ORAL at 19:35

## 2023-03-24 RX ADMIN — HEPARIN SODIUM 5000 UNITS: 5000 INJECTION, SOLUTION INTRAVENOUS; SUBCUTANEOUS at 23:05

## 2023-03-24 RX ADMIN — PIPERACILLIN AND TAZOBACTAM 3.38 G: 3; .375 INJECTION, POWDER, FOR SOLUTION INTRAVENOUS at 10:53

## 2023-03-24 RX ADMIN — SODIUM CHLORIDE: 9 INJECTION, SOLUTION INTRAVENOUS at 19:39

## 2023-03-24 RX ADMIN — FAMOTIDINE 20 MG: 20 TABLET ORAL at 08:42

## 2023-03-24 RX ADMIN — LOSARTAN POTASSIUM 100 MG: 100 TABLET, FILM COATED ORAL at 08:42

## 2023-03-24 RX ADMIN — LOTEPREDNOL ETABONATE 2 DROP: 5 SUSPENSION/ DROPS OPHTHALMIC at 16:59

## 2023-03-24 RX ADMIN — PIPERACILLIN AND TAZOBACTAM 3.38 G: 3; .375 INJECTION, POWDER, FOR SOLUTION INTRAVENOUS at 16:58

## 2023-03-24 RX ADMIN — OXYCODONE HYDROCHLORIDE 10 MG: 5 TABLET ORAL at 23:04

## 2023-03-24 RX ADMIN — ATORVASTATIN CALCIUM 20 MG: 20 TABLET, FILM COATED ORAL at 08:42

## 2023-03-24 ASSESSMENT — ACTIVITIES OF DAILY LIVING (ADL)
ADLS_ACUITY_SCORE: 35

## 2023-03-24 NOTE — CONSULTS
[facility]  Consult Note - Hospitalist Service  Date of Admission:  3/23/2023  Consult Requested by: Dr. Zabala  Reason for Consult: Comanagement comorbidities and postoperative state.    Assessment & Plan   Efrain Lock is a 47 year old male with a PMH significant for DM type II, and HTN, admitted on 3/23/2023 with perforated appendicitis and peritonitis status post laparoscopic appendectomy.    Perforated appendicitis and peritonitis status post laparoscopic appendectomy 3/23/23 with Dr. Zabala.  -Defer postoperative management of IV fluids, antibiotics, PT/OT, and anticoagulation to primary surgical team.  - Continue Zosyn    Urinary Tract infection  UA on admission remarkable for moderate leukocyte esterase. Culture pending.   - Zosyn likely to cover organism if cultures positive  - Recommend removal of Gomez catheter.    MAGEN  Baseline creatinine 1.04-1.19. 2 months ago creatinine 1.29. With gradual increase in creatinine, and underlying DM2, HTN, and hx of microalbuminuria there is some concern patient may have some underlying CKD. Prior to admission patient reports he did not eat for 2 days. Creatinine 1.56, likely due to dehydration. Anticipate creatinine may worsen with contrast dye exposure.   - On NS at 125 ml/hr per primary surgical team; can consider stopping IVFs when PO intake adequate.  - strict I/Os   - Avoid nephrotoxins including NSAIDs  - AM BMP  - Recommend f/u in 1 week with PCP for repeat creatinine. PCP may consider nephrology consult given patient's comorbidities and risk for developing CKD.    DM2  HgbA1C 6.5. PTA regimen includes metformin 500 mg BID and Ozempic 1 mg once a week.   - Hold metformin and Ozempic while hospitalized  - BG checks AC and HS  - Medium insulin resistant sliding scale     Hypertension  Hyperlipidemia  PTA regimen includes amlodipine 10 mg daily, losartan 100 mg daily, and atorvastatin 20mg daily. Patient reports compliance with  medications. BPs this admission  - PTA amlodipine and losartan resumed by primary team; will add hold parameters    Thank you for involving us in this patient's care.  Hospitalist service will complete chart check to review creatinine tomorrow.       The patient's care was discussed with the Attending Physician, Dr. Friedman.    Clinically Significant Risk Factors Present on Admission          # Hypocalcemia: Lowest Ca = 7.8 mg/dL in last 2 days, will monitor and replace as appropriate         # Hypertension: home medication list includes antihypertensive(s)     # DMII: A1C = 6.5 % (Ref range: 0.0 - 5.6 %) within past 6 months            Enrique Parker NP  Hospitalist Service  Securely message with [email or web address] (more info)  Text page via [facility] Paging/Directory   ______________________________________________________________________    Chief Complaint   Abdominal pain    History is obtained from the patient and chart review.    History of Present Illness   Efrain Lock is a 47 year old male with a PMH significant for DM type II, and HTN, admitted on 3/23/2023 with perforated appendicitis and peritonitis status post laparoscopic appendectomy. Patient initially presented with abdominal pain associated with fever and chills.     Had laparoscopic appendectomy 3/22/23 with Dr. Zabala performed under general anesthesia. EBL 5mL. No intraoperative complications.     Upon evaluation patient is nontoxic-appearing, sitting upright in bed.  He denies any fever, chills, SOB, palpitations, chest pain, nausea, vomiting, lower extremity swelling, lightheadedness, or dizziness.      Past Medical History    Past Medical History:   Diagnosis Date     Diabetes (H)      HTN (hypertension)      Microalbuminuria 10/30/2013     Sleep apnea      Tobacco abuse 07/12/2012       Past Surgical History   Past Surgical History:   Procedure Laterality Date     REPAIR ECTROPION Right 12/3/2020    Procedure: Right lower eyelid ectropion repair;   Surgeon: Oanh Donnelly MD;  [place] [facility] [place]     REPAIR PTOSIS       SURGICAL HISTORY OF -       rt knee surgery     WEDGE RESECTION EYELID Right 12/3/2020    Procedure: Right upper eyelid pentagonal wedge;  Surgeon: Oanh Donnelly MD;  [place] [facility] [place]       Medications   Medications Prior to Admission   Medication Sig Dispense Refill Last Dose     amLODIPine (NORVASC) 10 MG tablet Take 1 tablet (10 mg) by mouth daily 90 tablet 3 3/22/2023     atorvastatin (LIPITOR) 20 MG tablet Take 1 tablet (20 mg) by mouth daily 90 tablet 1 3/22/2023     losartan (COZAAR) 100 MG tablet Take 1 tablet (100 mg) by mouth daily 90 tablet 3 3/22/2023     loteprednol (LOTEMAX) 0.5 % ophthalmic suspension Place 1-2 drops into the right eye 4 times daily 10 mL 3 3/22/2023     metFORMIN (GLUCOPHAGE) 500 MG tablet Take 1 tablet (500 mg) by mouth 2 times daily (with meals) 180 tablet 1 3/22/2023     Multiple Vitamins-Minerals (MULTIVITAMIN ADULT PO) Take 1 tablet by mouth daily   3/22/2023     Semaglutide, 1 MG/DOSE, (OZEMPIC, 1 MG/DOSE,) 4 MG/3ML SOPN Inject 1 mg Subcutaneous once a week 3 mL 4 3/19/2023          Social History   I have reviewed this patient's social history and updated it with pertinent information if needed.  Social History     Tobacco Use     Smoking status: Every Day     Packs/day: 0.30     Types: Cigarettes     Smokeless tobacco: Never   Substance Use Topics     Alcohol use: No     Alcohol/week: 0.0 standard drinks     Drug use: No       Family History   I have reviewed this patient's family history and updated it with pertinent information if needed.  Family History   Problem Relation Age of Onset     Prostate Cancer Father 80     Macular Degeneration Father      Hypertension Paternal Grandmother      Colon Cancer No family hx of      Diabetes No family hx of      Myocardial Infarction No family hx of      Glaucoma No family hx of         Physical Exam   Vital Signs: Temp: 98.4  F (36.9  C) Temp src: Oral  BP: 105/69 Pulse: 88   Resp: 16 SpO2: 96 % O2 Device: None (Room air) Oxygen Delivery: 4 LPM  Weight: 237 lbs 10.49 oz    EXAM:   Nursing Notes Reviewed.  /69 (BP Location: Right arm)   Pulse 88   Temp 98.4  F (36.9  C) (Oral)   Resp 16   Wt 107.8 kg (237 lb 10.5 oz)   SpO2 96%   BMI 39.55 kg/m     General:  Appears stated age, no acute distress. A&O x 3.  Skin:  Warm, dry. No rashes or lesions on exposed skin.  Abdominal laparoscopic surgical site dressings are intact.  ASHLEY drain with small amount of serosanguineous drainage.  HEENT:  Normocephalic, atraumatic.  Neck:  Supple.  Chest:  Breath sounds CTA and no increased work of breathing on room air.  Cardiovascular:  RRR, no rub or murmur. No peripheral edema.  Abdomen:  Soft, mild tenderness mostly in lower abdomen, distended.  Musculoskeletal:  Moves all four extremities. No muscle atrophy.  Neurological: No focal neurological deficits.  Psychiatric:  Affect and mood congruent.      Medical Decision Making       45 MINUTES SPENT BY ME on the date of service doing chart review, history, exam, documentation & further activities per the note.      Data     I have personally reviewed the following data over the past 24 hrs:    17.5 (H)  \   12.4 (L)   / 216     138 105 14.9 /  115 (H)   3.7 21 (L) 1.56 (H) \       ALT: 39 AST: 25 AP: 148 (H) TBILI: 0.8   ALB: 4.6 TOT PROTEIN: 8.1 LIPASE: 37       Imaging results reviewed over the past 24 hrs:   Recent Results (from the past 24 hour(s))   CT Abdomen Pelvis w Contrast    Narrative    CT ABDOMEN PELVIS W CONTRAST 3/23/2023 2:20 PM    CLINICAL HISTORY: progressive abd pain since last night. upper now  lower and possibly more left sided.    TECHNIQUE: CT scan of the abdomen and pelvis was performed following  injection of IV contrast. Multiplanar reformats were obtained. Dose  reduction techniques were used.  CONTRAST: 116mL Isovue-370    COMPARISON: None.    FINDINGS:   LOWER CHEST:  Unremarkable.    HEPATOBILIARY: Normal.    PANCREAS: Normal.    SPLEEN: Normal.    ADRENAL GLANDS: Normal.    KIDNEYS/BLADDER: Likely small bilateral renal cysts, no specific  follow-up recommended. No hydronephrosis. Urinary bladder is  unremarkable.    BOWEL: Dilated appendix with surrounding inflammation as well as focal  defect of the appendiceal wall near the base (series 3 image 143,  series 5 image 43). There is evidence of peritonitis with dilated  distal small bowel without a distinct transition point visualized. The  colon is decompressed. No walled off, drainable fluid collection at  this time.    PELVIC ORGANS: Normal.    ADDITIONAL FINDINGS: None.    MUSCULOSKELETAL: No acute bony abnormality.      Impression    IMPRESSION:   1.  Perforated appendicitis with secondary findings of peritonitis. No  drainable fluid collection this time.  2.  Mildly dilated distal small bowel without distinct transition  point visualized, favor adynamic ileus secondary to #1 although  developing mechanical obstruction is not excluded. Recommend surgical  consultation.    IRENE KC MD         SYSTEM ID:  [id number]

## 2023-03-24 NOTE — PLAN OF CARE
Date & Time: 3/24/23 9942-5147  Orientation/Cognitive Concerns: A/O x4  Abnl VS/O2: VSS on RA  Pain Management: Scheduled tylenol, PRN oxycodone  Abnl Labs/BG: peritoneal fluid culture growing e coli. , 115  Behavior/Aggression Tool Color: green  Mobility: SBA/IND  Diet: clears  Bowel/Bladder: Gomez, no BM  Skin: Lap sites x 2  Drains/Devices: ASHLEY, PIV   Test/Procedures: N/A  Anticipated DC date: pending

## 2023-03-24 NOTE — ANESTHESIA CARE TRANSFER NOTE
Patient: Efrain Lock    Procedure: Procedure(s):  APPENDECTOMY, LAPAROSCOPIC       Diagnosis: Perforated appendicitis [K35.32]  Diagnosis Additional Information: No value filed.    Anesthesia Type:   General     Note:    Oropharynx: nasal airway in place and spontaneously breathing  Level of Consciousness: drowsy  Oxygen Supplementation: face mask  Level of Supplemental Oxygen (L/min / FiO2): 6    Dentition: dentition unchanged  Vital Signs Stable: post-procedure vital signs reviewed and stable  Report to RN Given: handoff report given  Patient transferred to: PACU    Handoff Report: Identifed the Patient, Identified the Reponsible Provider, Reviewed the pertinent medical history, Discussed the surgical course, Reviewed Intra-OP anesthesia mangement and issues during anesthesia, Set expectations for post-procedure period and Allowed opportunity for questions and acknowledgement of understanding      Vitals:  Vitals Value Taken Time   /71 03/23/23 1907   Temp     Pulse 94 03/23/23 1909   Resp 10 03/23/23 1909   SpO2 99 % 03/23/23 1909   Vitals shown include unvalidated device data.    Electronically Signed By: MIKEL Owens CRNA  March 23, 2023  7:10 PM

## 2023-03-24 NOTE — PLAN OF CARE
Goal Outcome Evaluation:                    Summary Perforated appendicitis   APPENDECTOMY, LAPAROSCOPIC POD 0     3/23/2023 [id number]    Orientation A&O X 4     Vitals/Tele VSS 4L NC/ CPAP    IV Access/drains PIV infusing  & int ABX, Gomez, ASHLEY drain    Diet Clear liq    Mobility Bedrest, needs to dangle    GI/ Gomez, no BM     Wound/Skin 3 Lap sites CDI    Discharge Plan Pending     Denies pain   See Flow sheets for assessment

## 2023-03-24 NOTE — PLAN OF CARE
Goal Outcome Evaluation:      Plan of Care Reviewed With: patient    Overall Patient Progress: improvingOverall Patient Progress: improving  Summary: POD1 S/P Lap appe for perforated appendicitis, Gomez and TRAVIS drain in place and drain o.k, 2 lap sites , 1 rt abd incision and travis drain site dressings all C/D/I  Care Plan Summary Note:  Orientation: A&OX4  Observation Goals (met & not met): N/A-surgical/inpt  Activity Level: Not OOB this shift  Fall Risk:Yes  Behavior & Aggression Tool Color: Green  Pain Management: PRN Oxy and scheduled Tylenol given for pain  ABNL VS/O2: VSS on RA,refused hospital CPAP  ABNL Lab/BG: UA +ve, already on Zosyn Q 6hrs,culture in process  Diet: clears, tolerating well, denies any nausea  Bowel/Bladder: Gomez in place, no BM this shift  Drains/Devices: TRAVIS drain, Gomez in place  Tests/Procedures for next shift: Hospitalist consult  Anticipated DC date: TBD  Other Important Info: Slept most of the night

## 2023-03-24 NOTE — PROGRESS NOTES
General Surgery Progress Note    Admission Date: 3/23/2023  Today's Date: 3/24/2023         Assessment:      Efrain Lock is a 47 year old male with perforated appendicitis and peritonitis POD 1 s/p laparoscopic appendectomy  - PMH includes DM2, HTN, possible underlying CKD  - +MAGEN, non-oliguric. +UTI seen on UA         Plan:   - Continue with clear liquids only for now. Patient is high risk for ileus  - Zosyn q6hrs for coverage of intra-abdominal infection and UTI. Urine culture pending, fluid sample in OR with no organisms on gram stain, follow culture result. Leukocytosis today as expected. Recheck labs tomorrow, follow renal function. Good UOP, continue herr for now  - Work on out of bed, ambulation. Subcutaneous heparin to start today for DVT ppx, PCDs while resting. Encourage IS use every hour  - Pain meds available as needed, not using much  - Continue maintenance IV fluids  - Hospitalist consult placed, unsure if they will need to follow for entire hospital stay. Greatly appreciate input    Dispo: anticipate a couple more days in hospital for monitoring, recovery, diet advancement        Interval History:   Tmax 104.2 yesterday, has been afebrile postoperatively. Needed some O2 overnight, does have sleep apnea, O2 now stable on room air. Efrain tells me that he is sore but doing ok today. Discussed surgical findings, anticipated ileus, plan going forward. He denies any nausea, is fine doing only clear liquids for now. Had a BM and voided some yesterday. Urine has become much more clear today.           Physical Exam:   /69 (BP Location: Right arm)   Pulse 88   Temp 98.4  F (36.9  C) (Oral)   Resp 16   Wt 107.8 kg (237 lb 10.5 oz)   SpO2 93%   BMI 39.55 kg/m    I/O last 3 completed shifts:  In: 600 [I.V.:600]  Out: 2140 [Urine:1700; Drains:430; Blood:10]  General: NAD, pleasant, alert and oriented x3  Cardiovascular: regular rate and rhythm; S1 and S2 distinct without murmur   Respiratory:  lungs clear to auscultation bilaterally without wheezes, rales or rhonchi   Abdomen: fairly soft, no significant distention. Mild tenderness throughout. ASHLEY drain in place with slightly cloudy serosanguinous fluid in bulb  Incisions: intact with steri strips, bandaids removed. No erythema or drainage  Extremities: No lower extremity edema, no calf tenderness. Wearing PCDs    LABS:  Recent Labs   Lab Test 03/24/23  0614 03/23/23  1319 02/28/20  0955   WBC 17.5* 9.7  --    HGB 12.4* 15.7 15.3   MCV 88 87  --     309  --       Recent Labs   Lab Test 03/24/23  0614 03/23/23  1319 01/04/23  1102   POTASSIUM 3.7 3.6 3.5   CHLORIDE 105 97* 105   CO2 21* 24 20*   BUN 14.9 13.1 17.6   CR 1.56* 1.56* 1.29*   ANIONGAP 12 12 16*      -------------------------------    Jennifer Woody PA-C  [facility]  [phone]

## 2023-03-24 NOTE — ANESTHESIA POSTPROCEDURE EVALUATION
Patient: Efrain Lock    Procedure: Procedure(s):  APPENDECTOMY, LAPAROSCOPIC       Anesthesia Type:  General    Note:  Disposition: Inpatient   Postop Pain Control: Uneventful            Sign Out: Well controlled pain   PONV: No   Neuro/Psych: Uneventful            Sign Out: Acceptable/Baseline neuro status   Airway/Respiratory: Uneventful            Sign Out: Acceptable/Baseline resp. status; AIRWAY IN SITU/Resp. Support               Airway in situ/Resp. Support: CPAP/BIPAP via mask                 Reason: Planned Pre-op   CV/Hemodynamics: Uneventful            Sign Out: Acceptable CV status; No obvious hypovolemia; No obvious fluid overload   Other NRE: NONE   DID A NON-ROUTINE EVENT OCCUR? No           Last vitals:  Vitals Value Taken Time   BP 97/60 03/23/23 1930   Temp 37.5  C (99.5  F) 03/23/23 1910   Pulse 89 03/23/23 1940   Resp 16 03/23/23 1940   SpO2 95 % 03/23/23 1940   Vitals shown include unvalidated device data.    Electronically Signed By: Mikey Bess MD  March 23, 2023  7:42 PM

## 2023-03-24 NOTE — OR NURSING
Notified Jennifer MADDOX re: blood tinged urine in herr, no clots. No new orders, continue to monitor.

## 2023-03-24 NOTE — PROVIDER NOTIFICATION
MD Notification    Notified Person: PA    Notified Person Name: Bong    Notification Date/Time: 3/24/23 8509, 4815     Notification Interaction: Amcom    Purpose of Notification: pt curious about herr removal. Also peritoneal fluid growing 1+ gram negative bacilli.     Orders Received:    Comments: Continue herr per primary team. Zosyn will cover for the current culture growth of peritoneal fluid.

## 2023-03-25 LAB
ANION GAP SERPL CALCULATED.3IONS-SCNC: 10 MMOL/L (ref 7–15)
BACTERIA PRT CULT: ABNORMAL
BACTERIA UR CULT: NO GROWTH
BUN SERPL-MCNC: 12.4 MG/DL (ref 6–20)
CALCIUM SERPL-MCNC: 8.8 MG/DL (ref 8.6–10)
CHLORIDE SERPL-SCNC: 107 MMOL/L (ref 98–107)
CREAT SERPL-MCNC: 1.45 MG/DL (ref 0.67–1.17)
DEPRECATED HCO3 PLAS-SCNC: 23 MMOL/L (ref 22–29)
ERYTHROCYTE [DISTWIDTH] IN BLOOD BY AUTOMATED COUNT: 12.9 % (ref 10–15)
GFR SERPL CREATININE-BSD FRML MDRD: 59 ML/MIN/1.73M2
GLUCOSE BLDC GLUCOMTR-MCNC: 106 MG/DL (ref 70–99)
GLUCOSE BLDC GLUCOMTR-MCNC: 113 MG/DL (ref 70–99)
GLUCOSE BLDC GLUCOMTR-MCNC: 123 MG/DL (ref 70–99)
GLUCOSE BLDC GLUCOMTR-MCNC: 138 MG/DL (ref 70–99)
GLUCOSE BLDC GLUCOMTR-MCNC: 96 MG/DL (ref 70–99)
GLUCOSE SERPL-MCNC: 114 MG/DL (ref 70–99)
HCT VFR BLD AUTO: 40.5 % (ref 40–53)
HGB BLD-MCNC: 13.5 G/DL (ref 13.3–17.7)
MCH RBC QN AUTO: 29.2 PG (ref 26.5–33)
MCHC RBC AUTO-ENTMCNC: 33.3 G/DL (ref 31.5–36.5)
MCV RBC AUTO: 88 FL (ref 78–100)
PLATELET # BLD AUTO: 241 10E3/UL (ref 150–450)
POTASSIUM SERPL-SCNC: 3.5 MMOL/L (ref 3.4–5.3)
RBC # BLD AUTO: 4.63 10E6/UL (ref 4.4–5.9)
SODIUM SERPL-SCNC: 140 MMOL/L (ref 136–145)
WBC # BLD AUTO: 13.7 10E3/UL (ref 4–11)

## 2023-03-25 PROCEDURE — 36415 COLL VENOUS BLD VENIPUNCTURE: CPT

## 2023-03-25 PROCEDURE — 120N000001 HC R&B MED SURG/OB

## 2023-03-25 PROCEDURE — 99232 SBSQ HOSP IP/OBS MODERATE 35: CPT | Performed by: PHYSICIAN ASSISTANT

## 2023-03-25 PROCEDURE — 250N000011 HC RX IP 250 OP 636: Performed by: PHYSICIAN ASSISTANT

## 2023-03-25 PROCEDURE — 82310 ASSAY OF CALCIUM: CPT

## 2023-03-25 PROCEDURE — 250N000013 HC RX MED GY IP 250 OP 250 PS 637

## 2023-03-25 PROCEDURE — 258N000003 HC RX IP 258 OP 636: Performed by: PHYSICIAN ASSISTANT

## 2023-03-25 PROCEDURE — 250N000013 HC RX MED GY IP 250 OP 250 PS 637: Performed by: PHYSICIAN ASSISTANT

## 2023-03-25 PROCEDURE — 85027 COMPLETE CBC AUTOMATED: CPT

## 2023-03-25 PROCEDURE — 250N000013 HC RX MED GY IP 250 OP 250 PS 637: Performed by: INTERNAL MEDICINE

## 2023-03-25 RX ORDER — SIMETHICONE 80 MG
80 TABLET,CHEWABLE ORAL 4 TIMES DAILY
Status: DISCONTINUED | OUTPATIENT
Start: 2023-03-25 | End: 2023-03-25

## 2023-03-25 RX ORDER — SIMETHICONE 80 MG
80 TABLET,CHEWABLE ORAL EVERY 6 HOURS PRN
Status: DISCONTINUED | OUTPATIENT
Start: 2023-03-25 | End: 2023-03-25

## 2023-03-25 RX ORDER — SIMETHICONE 80 MG
80 TABLET,CHEWABLE ORAL EVERY 4 HOURS
Status: DISCONTINUED | OUTPATIENT
Start: 2023-03-25 | End: 2023-03-27

## 2023-03-25 RX ORDER — SODIUM CHLORIDE, SODIUM LACTATE, POTASSIUM CHLORIDE, CALCIUM CHLORIDE 600; 310; 30; 20 MG/100ML; MG/100ML; MG/100ML; MG/100ML
INJECTION, SOLUTION INTRAVENOUS CONTINUOUS
Status: DISCONTINUED | OUTPATIENT
Start: 2023-03-25 | End: 2023-03-28

## 2023-03-25 RX ADMIN — SODIUM CHLORIDE, POTASSIUM CHLORIDE, SODIUM LACTATE AND CALCIUM CHLORIDE: 600; 310; 30; 20 INJECTION, SOLUTION INTRAVENOUS at 14:57

## 2023-03-25 RX ADMIN — LOSARTAN POTASSIUM 100 MG: 100 TABLET, FILM COATED ORAL at 10:07

## 2023-03-25 RX ADMIN — ATORVASTATIN CALCIUM 20 MG: 20 TABLET, FILM COATED ORAL at 10:08

## 2023-03-25 RX ADMIN — SENNOSIDES AND DOCUSATE SODIUM 1 TABLET: 50; 8.6 TABLET ORAL at 10:08

## 2023-03-25 RX ADMIN — PIPERACILLIN AND TAZOBACTAM 3.38 G: 3; .375 INJECTION, POWDER, FOR SOLUTION INTRAVENOUS at 05:14

## 2023-03-25 RX ADMIN — SIMETHICONE 80 MG: 80 TABLET, CHEWABLE ORAL at 06:50

## 2023-03-25 RX ADMIN — SODIUM CHLORIDE: 9 INJECTION, SOLUTION INTRAVENOUS at 04:03

## 2023-03-25 RX ADMIN — FAMOTIDINE 20 MG: 20 TABLET ORAL at 10:08

## 2023-03-25 RX ADMIN — FAMOTIDINE 20 MG: 20 TABLET ORAL at 20:11

## 2023-03-25 RX ADMIN — ACETAMINOPHEN 975 MG: 325 TABLET, FILM COATED ORAL at 13:18

## 2023-03-25 RX ADMIN — ACETAMINOPHEN 975 MG: 325 TABLET, FILM COATED ORAL at 23:14

## 2023-03-25 RX ADMIN — SODIUM CHLORIDE, POTASSIUM CHLORIDE, SODIUM LACTATE AND CALCIUM CHLORIDE: 600; 310; 30; 20 INJECTION, SOLUTION INTRAVENOUS at 23:19

## 2023-03-25 RX ADMIN — PIPERACILLIN AND TAZOBACTAM 3.38 G: 3; .375 INJECTION, POWDER, FOR SOLUTION INTRAVENOUS at 16:47

## 2023-03-25 RX ADMIN — SIMETHICONE 80 MG: 80 TABLET, CHEWABLE ORAL at 23:15

## 2023-03-25 RX ADMIN — SIMETHICONE 80 MG: 80 TABLET, CHEWABLE ORAL at 16:03

## 2023-03-25 RX ADMIN — SENNOSIDES AND DOCUSATE SODIUM 1 TABLET: 50; 8.6 TABLET ORAL at 20:11

## 2023-03-25 RX ADMIN — SIMETHICONE 80 MG: 80 TABLET, CHEWABLE ORAL at 13:18

## 2023-03-25 RX ADMIN — AMLODIPINE BESYLATE 10 MG: 10 TABLET ORAL at 10:08

## 2023-03-25 RX ADMIN — POLYETHYLENE GLYCOL 3350 17 G: 17 POWDER, FOR SOLUTION ORAL at 10:06

## 2023-03-25 RX ADMIN — DIPHENHYDRAMINE HYDROCHLORIDE 25 MG: 25 CAPSULE ORAL at 23:16

## 2023-03-25 RX ADMIN — ACETAMINOPHEN 975 MG: 325 TABLET, FILM COATED ORAL at 05:14

## 2023-03-25 RX ADMIN — SIMETHICONE 80 MG: 80 TABLET, CHEWABLE ORAL at 20:11

## 2023-03-25 RX ADMIN — PIPERACILLIN AND TAZOBACTAM 3.38 G: 3; .375 INJECTION, POWDER, FOR SOLUTION INTRAVENOUS at 11:10

## 2023-03-25 RX ADMIN — PIPERACILLIN AND TAZOBACTAM 3.38 G: 3; .375 INJECTION, POWDER, FOR SOLUTION INTRAVENOUS at 23:15

## 2023-03-25 RX ADMIN — HYDROMORPHONE HYDROCHLORIDE 0.5 MG: 1 INJECTION, SOLUTION INTRAMUSCULAR; INTRAVENOUS; SUBCUTANEOUS at 14:07

## 2023-03-25 ASSESSMENT — ACTIVITIES OF DAILY LIVING (ADL)
ADLS_ACUITY_SCORE: 35

## 2023-03-25 NOTE — PROVIDER NOTIFICATION
"MD Notification    Notified Person: cross-cover hospitalist    Notified Person Name: ZACHARY ARAGON     Notification Date/Time: 03/25/23  6:41 AM    Notification Interaction: amcom    Purpose of Notification: \" pt requesting PRN for gas pain. Able to order? Thanks.\"    Orders Received: awaiting response    Comments:    "

## 2023-03-25 NOTE — PLAN OF CARE
Goal Outcome Evaluation:    Orientation/Cognitive: AOX4  Observation Goals (Met/ Not Met): inpatient  Mobility Level/Assist Equipment: ind  Fall Risk (Y/N): N  Behavior Concerns: none  Pain Management: Scheduled tylenol, PRN oxycodone  Tele/VS/O2: VSS on RA ex Tmax 100.4, slightly tachycardic at times  ABNL Lab/BG: creat 1.56, WBC 17.5, UA abnormal  Diet: CLD  Bowel/Bladder: herr in place.   Skin Concerns: lap sites and ASHLEY drain CDI  Drains/Devices: PIV infusing NS at 125mL/hr  Tests/Procedures for next shift: AM labs   Anticipated DC date & active delays: TBD pending   Patient Stated Goal for Today: sleep

## 2023-03-25 NOTE — PROVIDER NOTIFICATION
MD Notification    Notified Person: MD    Notified Person Name: Dr. Marilu Diaz     Notification Date/Time: 3/25/2023 2:16 pm     Notification Interaction: phone page     Purpose of Notification: abdominal distension POD2    Orders Received:    Comments:

## 2023-03-25 NOTE — PROGRESS NOTES
"[facility]    Medicine Progress Note - Hospitalist Service    Date of Admission:  3/23/2023    Assessment & Plan   Efrain Lock is a 47 year old male with a PMH significant for DM type II, and HTN, admitted on 3/23/2023 with perforated appendicitis and peritonitis status post laparoscopic appendectomy.     Perforated appendicitis and peritonitis status post laparoscopic appendectomy 3/23/23 with Dr. Zabala.  Pt presented to ED 3/23 with abdominal pain, found to have perforated appendicitis and peritonitis on CT. Started on zosyn and was taken urgently to the OR. Procedure was performed under general anesthesia with an EBL of 5ml.   *peritoneal fluid growing E.Coli  Tmax last 24 hours is 100.4. ASHLEY remains in place   Had significant abdominal \"gas pain\" overnight, now passing flatus with improvement in symptoms.   -Defer postoperative management of IV fluids, antibiotics, PT/OT, and anticoagulation to primary surgical team.  - Continue Zosyn  --no need for ongoing herr from medicine standpoint, awaiting surgery plan      Abnormal UA   UA on admission remarkable for moderate leukocyte esterase. Culture pending.   *UC showing no growth      MAGEN  Baseline creatinine 1.04-1.19. 2 months ago creatinine 1.29. With gradual increase in creatinine, and underlying DM2, HTN, and hx of microalbuminuria there is some concern patient may have some underlying CKD. Prior to admission patient reports he did not eat for 2 days.  Creatinine 1.56 on admission, slight downtrend to 1.45  - On NS at 125 ml/hr per primary surgical team; can consider stopping IVFs when PO intake adequate.  - strict I/Os   - Avoid nephrotoxins including NSAIDs  - follow BMP in am  --will place hold on losartan for now pending BP/Cr trend      DM2  HgbA1C 6.5. PTA regimen includes metformin 500 mg BID and Ozempic 1 mg once a week.   - Hold metformin and Ozempic while hospitalized  - BG checks AC and HS  - Medium insulin resistant " sliding scale      Hypertension  Hyperlipidemia  PTA regimen includes amlodipine 10 mg daily, losartan 100 mg daily, and atorvastatin 20mg daily. Patient reports compliance with medications. BPs this admission  - continue PTA amlodipine with hold parameters  --hold PTA losartan given MAGEN           Diet: Clear Liquid Diet    DVT Prophylaxis: Defer to primary service  Gomez Catheter: PRESENT, indication: /GI/GYN Pelvic Procedure  Lines: None     Cardiac Monitoring: None  Code Status: Full Code      Clinically Significant Risk Factors          # Hypocalcemia: Lowest Ca = 7.8 mg/dL in last 2 days, will monitor and replace as appropriate              # DMII: A1C = 6.5 % (Ref range: 0.0 - 5.6 %) within past 6 months, PRESENT ON ADMISSION          Disposition Plan      Expected Discharge Date: 03/26/2023,  3:00 PM  Discharge Delays: Other (Add Comment)  Destination: home with family  Discharge Comments: Voiding trial        The patient's care was discussed with Dr. Cadena who agrees with the above plan     Danika Valenzuela PA-C  Hospitalist Service  [facility]  Securely message with [employer] (more info)  Text page via [employer] Paging/Directory   ______________________________________________________________________    Interval History   Reports significant gas pain overnight. Recently started passing flatus with improvement in symptoms. Denies significant nausea. No CP, SOB. HR still high, pt reports this is not baseline. Did not sleep at all last night, restless.     Physical Exam   Temp: 97.3  F (36.3  C) Temp src: Oral BP: 121/82 Pulse: 96   Resp: 18 SpO2: 95 % O2 Device: None (Room air)    Vitals:    03/23/23 1311 03/23/23 2046   Weight: 104.8 kg (231 lb) 107.8 kg (237 lb 10.5 oz)     Vital Signs with Ranges  Temp:  [97.3  F (36.3  C)-100.4  F (38  C)] 97.3  F (36.3  C)  Pulse:  [] 96  Resp:  [16-20] 18  BP: (121-161)/(74-93) 121/82  SpO2:  [92 %-96 %] 95 %  I/O last 3 completed  shifts:  In: -   Out: 4280 [Urine:4225; Drains:55]    Constitutional: Alert and oriented, laying down in bed. Appears comfortable and is appropriately conversant   ENT:  moist mucous membranes, dry lips   Respiratory: Lungs clear to auscultation bilaterally, no increased work of breathing or wheezing   Cardiovascular: Regular rhythm with mild tachycardia   GI: hypoactive, abdomen moderately distended with mild tenderness with palpation   Skin/Integumen: ASHLEY drain in place with cloudy serosanguinous fluid   MSK:  Moves all four extremities. Normal tone  Extremities:  No significant LE edema   :  Gomez in place     Medical Decision Making       Please see A&P for additional details of medical decision making.        Data     I have personally reviewed the following data over the past 24 hrs:    13.7 (H)  \   13.5   / 241     140 107 12.4 /  96   3.5 23 1.45 (H) \       Imaging results reviewed over the past 24 hrs:   No results found for this or any previous visit (from the past 24 hour(s)).

## 2023-03-25 NOTE — PROGRESS NOTES
[facility]  GENERAL SURGERY Progress Note    Admission Date: 3/23/2023  3/25/2023         Assessment and Plan:     Efrain Lock is a 47 year old male with perforated appendicitis and peritonitis, S/P lap appy POD 2  - Sips of clears, await ROBF  - Pain control- Tylenol ATC, oxy prn   - WBC 17.5-->13.5, on Zosyn  - Heparin for DVT prophylaxis  - Continue Pepcid  - Discontinue herr  - Simethicone ATC  - Ambulate 4x day and encourage IS  - Med management per hospitalist, much appreciate your assistance             Interval History:     Pain controlled, +Flatus/BM, UO adequate, would like herr removed, ambulating.  Meds reviewed.                      Physical Exam:   Blood pressure 135/88, pulse 102, temperature 97.3  F (36.3  C), temperature source Oral, resp. rate 22, weight 107.8 kg (237 lb 10.5 oz), SpO2 95 %.  Temperature Temp  Av.6  F (37  C)  Min: 97.3  F (36.3  C)  Max: 100.4  F (38  C)   I/O last 3 completed shifts:  In: -   Out: 3765 [Urine:3650; Drains:115]  Constitutional:  Awake and in no apparent distress.   Lungs: No increased work of breathing, good air exchange, clear to auscultation bilaterally.   Cardiovascular: Regular rate and rhythm.   Abdomen: Soft, non-distended, appropriately tender at incision(s), + BS. ASHLEY drain intact, serosanguinous.   Wound(s): Clean, dry, and intact. No erythema or drainage.    Extremities: No edema or calf tenderness. +SCDs          Data:     Recent Labs   Lab Test 23  0627 23  0614 23  1319   WBC 13.7* 17.5* 9.7   HGB 13.5 12.4* 15.7   HCT 40.5 37.6* 47.6    216 309      Recent Labs   Lab Test 23  0627 23  0614 23  1319    138 133*   POTASSIUM 3.5 3.7 3.6   CHLORIDE 107 105 97*   CO2 23 21* 24   BUN 12.4 14.9 13.1   CR 1.45* 1.56* 1.56*       Freya V. Mcbride, PA-C  [facility]  [phone]

## 2023-03-26 LAB
ANION GAP SERPL CALCULATED.3IONS-SCNC: 12 MMOL/L (ref 7–15)
BUN SERPL-MCNC: 17.1 MG/DL (ref 6–20)
CALCIUM SERPL-MCNC: 8.7 MG/DL (ref 8.6–10)
CHLORIDE SERPL-SCNC: 105 MMOL/L (ref 98–107)
CREAT SERPL-MCNC: 1.41 MG/DL (ref 0.67–1.17)
DEPRECATED HCO3 PLAS-SCNC: 24 MMOL/L (ref 22–29)
ERYTHROCYTE [DISTWIDTH] IN BLOOD BY AUTOMATED COUNT: 13 % (ref 10–15)
GFR SERPL CREATININE-BSD FRML MDRD: 61 ML/MIN/1.73M2
GLUCOSE BLDC GLUCOMTR-MCNC: 108 MG/DL (ref 70–99)
GLUCOSE BLDC GLUCOMTR-MCNC: 113 MG/DL (ref 70–99)
GLUCOSE BLDC GLUCOMTR-MCNC: 115 MG/DL (ref 70–99)
GLUCOSE BLDC GLUCOMTR-MCNC: 121 MG/DL (ref 70–99)
GLUCOSE BLDC GLUCOMTR-MCNC: 130 MG/DL (ref 70–99)
GLUCOSE SERPL-MCNC: 117 MG/DL (ref 70–99)
HCT VFR BLD AUTO: 43.2 % (ref 40–53)
HGB BLD-MCNC: 14.1 G/DL (ref 13.3–17.7)
MCH RBC QN AUTO: 28.4 PG (ref 26.5–33)
MCHC RBC AUTO-ENTMCNC: 32.6 G/DL (ref 31.5–36.5)
MCV RBC AUTO: 87 FL (ref 78–100)
PLATELET # BLD AUTO: 302 10E3/UL (ref 150–450)
POTASSIUM SERPL-SCNC: 3.3 MMOL/L (ref 3.4–5.3)
RBC # BLD AUTO: 4.96 10E6/UL (ref 4.4–5.9)
SODIUM SERPL-SCNC: 141 MMOL/L (ref 136–145)
WBC # BLD AUTO: 11.3 10E3/UL (ref 4–11)

## 2023-03-26 PROCEDURE — 250N000013 HC RX MED GY IP 250 OP 250 PS 637: Performed by: PHYSICIAN ASSISTANT

## 2023-03-26 PROCEDURE — 99233 SBSQ HOSP IP/OBS HIGH 50: CPT | Performed by: PHYSICIAN ASSISTANT

## 2023-03-26 PROCEDURE — 250N000011 HC RX IP 250 OP 636: Performed by: PHYSICIAN ASSISTANT

## 2023-03-26 PROCEDURE — 36415 COLL VENOUS BLD VENIPUNCTURE: CPT

## 2023-03-26 PROCEDURE — 250N000013 HC RX MED GY IP 250 OP 250 PS 637

## 2023-03-26 PROCEDURE — 120N000001 HC R&B MED SURG/OB

## 2023-03-26 PROCEDURE — 93005 ELECTROCARDIOGRAM TRACING: CPT

## 2023-03-26 PROCEDURE — 258N000003 HC RX IP 258 OP 636: Performed by: PHYSICIAN ASSISTANT

## 2023-03-26 PROCEDURE — 80048 BASIC METABOLIC PNL TOTAL CA: CPT

## 2023-03-26 PROCEDURE — 85014 HEMATOCRIT: CPT

## 2023-03-26 PROCEDURE — 93010 ELECTROCARDIOGRAM REPORT: CPT | Performed by: INTERNAL MEDICINE

## 2023-03-26 RX ORDER — POTASSIUM CHLORIDE 1500 MG/1
20 TABLET, EXTENDED RELEASE ORAL 2 TIMES DAILY
Status: COMPLETED | OUTPATIENT
Start: 2023-03-26 | End: 2023-03-26

## 2023-03-26 RX ADMIN — SIMETHICONE 80 MG: 80 TABLET, CHEWABLE ORAL at 12:17

## 2023-03-26 RX ADMIN — HEPARIN SODIUM 5000 UNITS: 5000 INJECTION, SOLUTION INTRAVENOUS; SUBCUTANEOUS at 16:36

## 2023-03-26 RX ADMIN — PIPERACILLIN AND TAZOBACTAM 3.38 G: 3; .375 INJECTION, POWDER, FOR SOLUTION INTRAVENOUS at 16:39

## 2023-03-26 RX ADMIN — SIMETHICONE 80 MG: 80 TABLET, CHEWABLE ORAL at 19:58

## 2023-03-26 RX ADMIN — ATORVASTATIN CALCIUM 20 MG: 20 TABLET, FILM COATED ORAL at 08:54

## 2023-03-26 RX ADMIN — POTASSIUM CHLORIDE 20 MEQ: 1500 TABLET, EXTENDED RELEASE ORAL at 19:57

## 2023-03-26 RX ADMIN — ACETAMINOPHEN 975 MG: 325 TABLET, FILM COATED ORAL at 15:18

## 2023-03-26 RX ADMIN — POLYETHYLENE GLYCOL 3350 17 G: 17 POWDER, FOR SOLUTION ORAL at 08:52

## 2023-03-26 RX ADMIN — POTASSIUM CHLORIDE 20 MEQ: 1500 TABLET, EXTENDED RELEASE ORAL at 08:57

## 2023-03-26 RX ADMIN — PIPERACILLIN AND TAZOBACTAM 3.38 G: 3; .375 INJECTION, POWDER, FOR SOLUTION INTRAVENOUS at 05:20

## 2023-03-26 RX ADMIN — PIPERACILLIN AND TAZOBACTAM 3.38 G: 3; .375 INJECTION, POWDER, FOR SOLUTION INTRAVENOUS at 11:01

## 2023-03-26 RX ADMIN — FAMOTIDINE 20 MG: 20 TABLET ORAL at 19:57

## 2023-03-26 RX ADMIN — SIMETHICONE 80 MG: 80 TABLET, CHEWABLE ORAL at 23:26

## 2023-03-26 RX ADMIN — SENNOSIDES AND DOCUSATE SODIUM 1 TABLET: 50; 8.6 TABLET ORAL at 08:53

## 2023-03-26 RX ADMIN — SIMETHICONE 80 MG: 80 TABLET, CHEWABLE ORAL at 08:54

## 2023-03-26 RX ADMIN — AMLODIPINE BESYLATE 10 MG: 10 TABLET ORAL at 08:54

## 2023-03-26 RX ADMIN — FAMOTIDINE 20 MG: 20 TABLET ORAL at 08:54

## 2023-03-26 RX ADMIN — SIMETHICONE 80 MG: 80 TABLET, CHEWABLE ORAL at 16:36

## 2023-03-26 RX ADMIN — SENNOSIDES AND DOCUSATE SODIUM 1 TABLET: 50; 8.6 TABLET ORAL at 19:57

## 2023-03-26 RX ADMIN — ACETAMINOPHEN 975 MG: 325 TABLET, FILM COATED ORAL at 05:20

## 2023-03-26 RX ADMIN — SODIUM CHLORIDE, POTASSIUM CHLORIDE, SODIUM LACTATE AND CALCIUM CHLORIDE: 600; 310; 30; 20 INJECTION, SOLUTION INTRAVENOUS at 17:33

## 2023-03-26 RX ADMIN — ONDANSETRON 4 MG: 2 INJECTION INTRAMUSCULAR; INTRAVENOUS at 10:56

## 2023-03-26 RX ADMIN — HEPARIN SODIUM 5000 UNITS: 5000 INJECTION, SOLUTION INTRAVENOUS; SUBCUTANEOUS at 23:26

## 2023-03-26 RX ADMIN — PIPERACILLIN AND TAZOBACTAM 3.38 G: 3; .375 INJECTION, POWDER, FOR SOLUTION INTRAVENOUS at 23:26

## 2023-03-26 RX ADMIN — SIMETHICONE 80 MG: 80 TABLET, CHEWABLE ORAL at 05:22

## 2023-03-26 ASSESSMENT — ACTIVITIES OF DAILY LIVING (ADL)
ADLS_ACUITY_SCORE: 35
ADLS_ACUITY_SCORE: 35
ADLS_ACUITY_SCORE: 18
ADLS_ACUITY_SCORE: 35

## 2023-03-26 NOTE — PROGRESS NOTES
[facility]    Medicine Progress Note - Hospitalist Service    Date of Admission:  3/23/2023    Assessment & Plan   Efrain Lock is a 47 year old male with a PMH significant for DM type II, and HTN, admitted on 3/23/2023 with perforated appendicitis and peritonitis status post laparoscopic appendectomy.     Perforated appendicitis and peritonitis status post laparoscopic appendectomy 3/23/23 with Dr. Zabala  Pt presented to ED 3/23 with abdominal pain, found to have perforated appendicitis and peritonitis on CT. Started on zosyn and was taken urgently to the OR. Procedure was performed under general anesthesia with an EBL of 5ml.   *peritoneal fluid growing E.Coli  Afebrile x24 hours ASHLEY remains in place. Ongoing mild tachycardia   -Defer postoperative management of IV fluids, antibiotics, PT/OT, and anticoagulation to primary surgical team.  - Continue Zosyn  - full liquids attempted today, vomiting x3 following. Diet downgraded  - pt encouraged to comply with IVF     Abnormal UA   UA on admission remarkable for moderate leukocyte esterase. Culture pending.   *UC showing no growth      MAGEN  Baseline creatinine 1.04-1.19. 2 months ago creatinine 1.29. With gradual increase in creatinine, and underlying DM2, HTN, and hx of microalbuminuria there is some concern patient may have some underlying CKD. Prior to admission patient reports he did not eat for 2 days.  Creatinine 1.56 on admission, slight downtrend to 1.4 and stable   - On LR @ 125/hr, continue   - strict I/Os   - Avoid nephrotoxins including NSAIDs  - follow BMP in am  --will place hold on losartan for now pending BP/Cr trend     Hypokalemia  Give 40meq K today, recheck in am      DM2  HgbA1C 6.5. PTA regimen includes metformin 500 mg BID and Ozempic 1 mg once a week.   - Hold metformin and Ozempic while hospitalized  - BG checks AC and HS  - Medium insulin resistant sliding scale, will add carb correction as diet advanced       Hypertension  Hyperlipidemia  PTA regimen includes amlodipine 10 mg daily, losartan 100 mg daily, and atorvastatin 20mg daily. Patient reports compliance with medications. BPs this admission  - continue PTA amlodipine with hold parameters  --hold PTA losartan given MAGEN         Diet: Clear Liquid Diet    DVT Prophylaxis: Defer to primary service  Gomez Catheter: Not present  Lines: None     Cardiac Monitoring: None  Code Status: Full Code      Clinically Significant Risk Factors        # Hypokalemia: Lowest K = 3.3 mmol/L in last 2 days, will replace as needed                # DMII: A1C = 6.5 % (Ref range: 0.0 - 5.6 %) within past 6 months, PRESENT ON ADMISSION          Disposition Plan     Expected Discharge Date: 03/26/2023,  3:00 PM  Discharge Delays: Other (Add Comment)  Destination: home with family  Discharge Comments: Voiding trial        The patient's care was discussed with Dr. Surinder Valenzuela PA-C  Hospitalist Service  [facility]  Securely message with [employer] (more info)  Text page via [employer] Paging/Directory   ______________________________________________________________________    Interval History   Feeling much better today. Continuous to pas gas intermittently. Pain improving (currently 1-2/10). No nausea. No CP, SOB, palpitations. Ambulating around the unit. No fevers, chills. Feels he is drinking adequate fluids and wants to stop IVF.     Physical Exam   Temp: 97.6  F (36.4  C) Temp src: Oral BP: (!) 133/95 Pulse: 104   Resp: 16 SpO2: 95 % O2 Device: None (Room air)    Vitals:    03/23/23 1311 03/23/23 2046   Weight: 104.8 kg (231 lb) 107.8 kg (237 lb 10.5 oz)     Vital Signs with Ranges  Temp:  [97.3  F (36.3  C)-97.7  F (36.5  C)] 97.6  F (36.4  C)  Pulse:  [] 104  Resp:  [16-22] 16  BP: (121-143)/(82-95) 133/95  SpO2:  [95 %] 95 %  I/O last 3 completed shifts:  In: 700 [P.O.:700]  Out: 1690 [Urine:1350; Drains:340]    Constitutional: Alert and  oriented, laying down in bed. Appears comfortable and is appropriately conversant   ENT:  moist mucous membranes  Respiratory: Lungs clear to auscultation bilaterally, no increased work of breathing or wheezing   Cardiovascular: Regular rhythm with mild tachycardia   GI: hypoactive though increased from yesterday, abdomen moderately distended with mild tenderness with palpation   Skin/Integumen: ASHLEY drain in place with cloudy fluid   MSK:  Moves all four extremities. Normal tone  Extremities:  No significant LE edema       Medical Decision Making       65 MINUTES SPENT BY ME on the date of service doing chart review, history, exam, documentation & further activities per the note.      Data     I have personally reviewed the following data over the past 24 hrs:    11.3 (H)  \   14.1   / 302     141 105 17.1 /  121 (H)   3.3 (L) 24 1.41 (H) \

## 2023-03-26 NOTE — PROVIDER NOTIFICATION
MD Notification    Notified Person: MD    Notified Person Name: Dr. Zabala or Dr. Julio Cesar Diaz    Notification Date/Time:1330 03/26/23    Notification Interaction:office called     Purpose of Notification:  Patient has questions for the surgeon.

## 2023-03-26 NOTE — PLAN OF CARE
Goal Outcome Evaluation:    Orientation/Cognitive: AOX4  Observation Goals (Met/ Not Met): inpatient  Mobility Level/Assist Equipment: ind  Fall Risk (Y/N): N  Behavior Concerns: none  Pain Management: Scheduled tylenol, simethicone  Tele/VS/O2: VSS on RA ex tachycardia  ABNL Lab/BG: creat 1.45, WBC 13.7  Diet: CLD  Bowel/Bladder: continent, adequate UOP  Skin Concerns: lap sites and ASHLEY drain CDI  Drains/Devices: PIV SL, pt refusing fluids overnight. ASHLEY drain in place  Tests/Procedures for next shift: AM labs   Anticipated DC date & active delays: possibly 3/26 pending improvement   Patient Stated Goal for Today: pain control

## 2023-03-26 NOTE — PLAN OF CARE
Goal Outcome Evaluation:       Shift summary [id number]:        Orientation/Cognitive: AOX4  Observation Goals (Met/ Not Met): inpatient  Mobility Level/Assist Equipment: IND  Fall Risk (Y/N): No.   Behavior Concerns: none.  Pain Management: Scheduled tylenol, simethicone.  Tele/VS/O2: VSS on RA ex tachycardia  ABNL Lab/BG: creat 1.41  WBC 13.7  Blood sugars stable.   Diet: NPO  except meds.   Bowel/Bladder: continent, adequate UOP  Skin Concerns: lap sites and ASHLEY drain CDI  Drains/Devices: PIV infusing.  ASHLEY drain in place.  NG placed at LIS.    Tests/Procedures for next shift: AM labs   Anticipated DC date & active delays: possibly 3/28  --pending improvement   Patient Stated Goal for Today: pain control       Patient had x3 emesis today; bile colored.  Immediate bile returns upon placement of NG tube.  Patient states he is feeling much better; less distended.

## 2023-03-26 NOTE — PLAN OF CARE
Shift Summary [id number]:          Goal Outcome Evaluation:     Orientation/Cognitive: AOX4  Observation Goals (Met/ Not Met): Inpatient  Mobility Level/Assist Equipment: IND  Fall Risk (Y/N): N  Behavior Concerns: none  Pain Management: PRN Dilaudid IV and oral Tylenol.   Tele/VS/O2: VSS on RA , slightly tachycardic at times  ABNL Lab/BG: creat trending down.  Monitoring blood sugars--stable.   Diet: Clears  Bowel/Bladder: herr discontinued @1530.  Patient due to void.    Skin Concerns: x2lap sites and ASHLEY drain CDI  Drains/Devices: PIV infusing LLR at 125mL/hr  Tests/Procedures for next shift: AM labs   Anticipated DC date & active delays: TBD pending   Patient Stated Goal for Today:  relief from the gas discomfort.     Patient had simethicone medication changed to a frequency of every 4 hours,    Surgery primary. Hospitalist consulted.

## 2023-03-26 NOTE — PROGRESS NOTES
Surgery  Pt doing better by report.  WBC better, still significant drainage from ASHLEY.  Advance diet to full liquid, continue abx.  Possibly home tomorrow.  Harvey Zabala MD  General Surgery, Office [phone]

## 2023-03-26 NOTE — PROGRESS NOTES
Paged about nausea and vomiting x 3 episodes. Patient seen and examined.  Abdomen distended, appropriately tender at incisions. NPO, place NGT to low intermittent suction if ongoing n/v. Orders placed.     Freya Mcbride PA-C

## 2023-03-27 LAB
ANION GAP SERPL CALCULATED.3IONS-SCNC: 14 MMOL/L (ref 7–15)
BUN SERPL-MCNC: 18.5 MG/DL (ref 6–20)
CALCIUM SERPL-MCNC: 9 MG/DL (ref 8.6–10)
CHLORIDE SERPL-SCNC: 105 MMOL/L (ref 98–107)
CREAT SERPL-MCNC: 1.53 MG/DL (ref 0.67–1.17)
DEPRECATED HCO3 PLAS-SCNC: 24 MMOL/L (ref 22–29)
ERYTHROCYTE [DISTWIDTH] IN BLOOD BY AUTOMATED COUNT: 13.2 % (ref 10–15)
GFR SERPL CREATININE-BSD FRML MDRD: 55 ML/MIN/1.73M2
GLUCOSE BLDC GLUCOMTR-MCNC: 101 MG/DL (ref 70–99)
GLUCOSE BLDC GLUCOMTR-MCNC: 94 MG/DL (ref 70–99)
GLUCOSE SERPL-MCNC: 103 MG/DL (ref 70–99)
HCT VFR BLD AUTO: 43.8 % (ref 40–53)
HGB BLD-MCNC: 14.3 G/DL (ref 13.3–17.7)
MCH RBC QN AUTO: 28.5 PG (ref 26.5–33)
MCHC RBC AUTO-ENTMCNC: 32.6 G/DL (ref 31.5–36.5)
MCV RBC AUTO: 87 FL (ref 78–100)
PATH REPORT.COMMENTS IMP SPEC: NORMAL
PATH REPORT.COMMENTS IMP SPEC: NORMAL
PATH REPORT.FINAL DX SPEC: NORMAL
PATH REPORT.GROSS SPEC: NORMAL
PATH REPORT.MICROSCOPIC SPEC OTHER STN: NORMAL
PATH REPORT.RELEVANT HX SPEC: NORMAL
PHOTO IMAGE: NORMAL
PLATELET # BLD AUTO: 347 10E3/UL (ref 150–450)
POTASSIUM SERPL-SCNC: 3.3 MMOL/L (ref 3.4–5.3)
RBC # BLD AUTO: 5.02 10E6/UL (ref 4.4–5.9)
SODIUM SERPL-SCNC: 143 MMOL/L (ref 136–145)
WBC # BLD AUTO: 11.1 10E3/UL (ref 4–11)

## 2023-03-27 PROCEDURE — 36415 COLL VENOUS BLD VENIPUNCTURE: CPT | Performed by: PHYSICIAN ASSISTANT

## 2023-03-27 PROCEDURE — 258N000003 HC RX IP 258 OP 636: Performed by: PHYSICIAN ASSISTANT

## 2023-03-27 PROCEDURE — 99231 SBSQ HOSP IP/OBS SF/LOW 25: CPT | Performed by: NURSE PRACTITIONER

## 2023-03-27 PROCEDURE — 88304 TISSUE EXAM BY PATHOLOGIST: CPT | Mod: 26 | Performed by: PATHOLOGY

## 2023-03-27 PROCEDURE — 250N000013 HC RX MED GY IP 250 OP 250 PS 637: Performed by: PHYSICIAN ASSISTANT

## 2023-03-27 PROCEDURE — 80048 BASIC METABOLIC PNL TOTAL CA: CPT | Performed by: PHYSICIAN ASSISTANT

## 2023-03-27 PROCEDURE — 250N000013 HC RX MED GY IP 250 OP 250 PS 637

## 2023-03-27 PROCEDURE — 94660 CPAP INITIATION&MGMT: CPT

## 2023-03-27 PROCEDURE — 999N000157 HC STATISTIC RCP TIME EA 10 MIN

## 2023-03-27 PROCEDURE — 85027 COMPLETE CBC AUTOMATED: CPT | Performed by: PHYSICIAN ASSISTANT

## 2023-03-27 PROCEDURE — 250N000013 HC RX MED GY IP 250 OP 250 PS 637: Performed by: NURSE PRACTITIONER

## 2023-03-27 PROCEDURE — 120N000001 HC R&B MED SURG/OB

## 2023-03-27 PROCEDURE — 250N000011 HC RX IP 250 OP 636: Performed by: PHYSICIAN ASSISTANT

## 2023-03-27 RX ORDER — POTASSIUM CHLORIDE 1500 MG/1
40 TABLET, EXTENDED RELEASE ORAL ONCE
Status: COMPLETED | OUTPATIENT
Start: 2023-03-27 | End: 2023-03-27

## 2023-03-27 RX ORDER — SIMETHICONE 80 MG
80 TABLET,CHEWABLE ORAL 4 TIMES DAILY PRN
Status: DISCONTINUED | OUTPATIENT
Start: 2023-03-27 | End: 2023-04-01 | Stop reason: HOSPADM

## 2023-03-27 RX ADMIN — ATORVASTATIN CALCIUM 20 MG: 20 TABLET, FILM COATED ORAL at 07:52

## 2023-03-27 RX ADMIN — SODIUM CHLORIDE, POTASSIUM CHLORIDE, SODIUM LACTATE AND CALCIUM CHLORIDE: 600; 310; 30; 20 INJECTION, SOLUTION INTRAVENOUS at 02:12

## 2023-03-27 RX ADMIN — SIMETHICONE 80 MG: 80 TABLET, CHEWABLE ORAL at 23:00

## 2023-03-27 RX ADMIN — LOTEPREDNOL ETABONATE 2 DROP: 5 SUSPENSION/ DROPS OPHTHALMIC at 11:47

## 2023-03-27 RX ADMIN — FAMOTIDINE 20 MG: 20 TABLET ORAL at 07:52

## 2023-03-27 RX ADMIN — AMLODIPINE BESYLATE 10 MG: 10 TABLET ORAL at 07:52

## 2023-03-27 RX ADMIN — POTASSIUM CHLORIDE 40 MEQ: 1500 TABLET, EXTENDED RELEASE ORAL at 11:47

## 2023-03-27 RX ADMIN — SODIUM CHLORIDE, POTASSIUM CHLORIDE, SODIUM LACTATE AND CALCIUM CHLORIDE: 600; 310; 30; 20 INJECTION, SOLUTION INTRAVENOUS at 15:36

## 2023-03-27 RX ADMIN — PIPERACILLIN AND TAZOBACTAM 3.38 G: 3; .375 INJECTION, POWDER, FOR SOLUTION INTRAVENOUS at 04:24

## 2023-03-27 RX ADMIN — PIPERACILLIN AND TAZOBACTAM 3.38 G: 3; .375 INJECTION, POWDER, FOR SOLUTION INTRAVENOUS at 11:48

## 2023-03-27 RX ADMIN — LOTEPREDNOL ETABONATE 2 DROP: 5 SUSPENSION/ DROPS OPHTHALMIC at 08:06

## 2023-03-27 RX ADMIN — FAMOTIDINE 20 MG: 20 TABLET ORAL at 20:10

## 2023-03-27 RX ADMIN — PIPERACILLIN AND TAZOBACTAM 3.38 G: 3; .375 INJECTION, POWDER, FOR SOLUTION INTRAVENOUS at 22:21

## 2023-03-27 RX ADMIN — LOTEPREDNOL ETABONATE 2 DROP: 5 SUSPENSION/ DROPS OPHTHALMIC at 17:21

## 2023-03-27 RX ADMIN — SIMETHICONE 80 MG: 80 TABLET, CHEWABLE ORAL at 07:52

## 2023-03-27 RX ADMIN — PIPERACILLIN AND TAZOBACTAM 3.38 G: 3; .375 INJECTION, POWDER, FOR SOLUTION INTRAVENOUS at 17:21

## 2023-03-27 RX ADMIN — LOTEPREDNOL ETABONATE 2 DROP: 5 SUSPENSION/ DROPS OPHTHALMIC at 20:09

## 2023-03-27 RX ADMIN — SODIUM CHLORIDE, POTASSIUM CHLORIDE, SODIUM LACTATE AND CALCIUM CHLORIDE: 600; 310; 30; 20 INJECTION, SOLUTION INTRAVENOUS at 23:00

## 2023-03-27 ASSESSMENT — ACTIVITIES OF DAILY LIVING (ADL)
ADLS_ACUITY_SCORE: 18

## 2023-03-27 NOTE — PROGRESS NOTES
[facility]    Medicine Progress Note - Hospitalist Service    Date of Admission:  3/23/2023    Assessment & Plan   Efrain Lock is a 47 year old male with a PMH significant for DM type II, and HTN, admitted on 3/23/2023 with perforated appendicitis and peritonitis status post laparoscopic appendectomy.    Perforated appendicitis and peritonitis status post laparoscopic appendectomy 3/23/23 with Dr. Zabala  Pt presented to ED 3/23 with abdominal pain, found to have perforated appendicitis and peritonitis on CT. Started on zosyn and was taken urgently to the OR. Procedure was performed under general anesthesia with an EBL of 5ml.   *peritoneal fluid growing E.Coli  -ASHLEY remains in place. strip every shift. Anticipate removal prior to discharge  -Ongoing mild tachycardia   -WBC 11.1  -Afebrile   -Defer postoperative management of IV fluids, antibiotics, PT/OT, and anticoagulation to primary surgical team.  -NG accidentally removed overnight, patient requesting to keep it out.  OK to keep NG out for now, but monitor closely. If recurrent nausea/emesis, replace NG tube.  Ice chips and small sips of water/clears today. If tolerating well with NG out, can increase PO intake tomorrow.  - Continue Zosyn. Abdominal fluid cultures with e coli and 2 anaerobes. Likely transition to Augmentin at discharge.   -pt encouraged to comply with IVF  -patient refusing heparin sub-Q. Education given on the importance on DVT PPx. Patient still refusing. Patient is getting up ambulating to around room. Discontinue heparin per general surgery.         Abnormal UA   UA on admission remarkable for moderate leukocyte esterase. Culture pending.   *UC showing no growth      MAGEN  Baseline creatinine 1.04-1.19. 2 months ago creatinine 1.29. With gradual increase in creatinine, and underlying DM2, HTN, and hx of microalbuminuria there is some concern patient may have some underlying CKD. Prior to admission patient reports  he did not eat for 2 days.  Creatinine 1.56 on admission, slight downtrend to 1.53 and stable   - On LR @ 125/hr, continue   - strict I/Os   - Avoid nephrotoxins including NSAIDs  - follow BMP in am  --will place hold on losartan for now      Hypokalemia  -replete as needed  -trend level       DM2  HgbA1C 6.5. PTA regimen includes metformin 500 mg BID and Ozempic 1 mg once a week.   - Hold metformin and Ozempic while hospitalized  - BG checks BID  -discontinue sliding scale insulin due to patient not receiving any insulin during hospitalization. Nursing communication order place if blood glucose elevates over 150 notify provider.     Hypertension  Hyperlipidemia  PTA regimen includes amlodipine 10 mg daily, losartan 100 mg daily, and atorvastatin 20mg daily. Patient reports compliance with medications. BPs this admission  -continue PTA amlodipine with hold parameters  --hold PTA losartan given MAGEN       Diet: Clear Liquid Diet    DVT Prophylaxis: Defer to primary service  Gomez Catheter: Not present  Lines: None     Cardiac Monitoring: None  Code Status: Full Code      Clinically Significant Risk Factors        # Hypokalemia: Lowest K = 3.3 mmol/L in last 2 days, will replace as needed                # DMII: A1C = 6.5 % (Ref range: 0.0 - 5.6 %) within past 6 months, PRESENT ON ADMISSION          Disposition Plan      Expected Discharge Date: 03/28/2023,  3:00 PM    Destination: home with family  Discharge Comments: Plan to stay another night.  Surgery primary,        The patient's care was discussed with the Attending Physician, Dr. Lacy .    Vanessa Mart CNP  Hospitalist Service  [facility]  Securely message with [employer] (more info)  Text page via [employer] Paging/Directory   ______________________________________________________________________    Interval History   -continue to trend creatinine. BMP in am. Continue IVF   -Ok for clear liquids   -discontinue AcHs blood glucose monitoring  due to patient not receiving any sliding scale insulin while inpatient. Will check blood glucose levels BID   -Denies any true pain, only having some abdominal discomfort. Requests abdominal binder. Does not want any pain meds including tylenol or simethicone.  -Has had 3 bowel movements since 0130, first was somewhat solid but the other 2 were liquid.   Physical Exam   Vital Signs: Temp: 98  F (36.7  C) Temp src: Oral BP: 138/83 Pulse: 101   Resp: 16 SpO2: 97 % O2 Device: BiPAP/CPAP    Weight: 237 lbs 10.49 oz  General: NAD, pleasant, alert and oriented x3  Cardiovascular: regular rhythm, rate 100; S1 and S2 distinct without murmur   Respiratory: lungs clear to auscultation bilaterally without wheezes, rales or rhonchi   Abdomen: rotund and distended today but overall soft. Appropriately tender at incisions. ASHLEY drain in place with fairly clear serous fluid in bulb  Incisions: intact with steri strips. No erythema or drainage  Extremities: No lower extremity edema, no calf tenderness    Medical Decision Making       30 MINUTES SPENT BY ME on the date of service doing chart review, history, exam, documentation & further activities per the note.  MANAGEMENT DISCUSSED with the following over the past 24 hours: patient, patient family members, Dr. Lacy        Data     I have personally reviewed the following data over the past 24 hrs:    11.1 (H)  \   14.3   / 347     143 105 18.5 /  101 (H)   3.3 (L) 24 1.53 (H) \       Imaging results reviewed over the past 24 hrs:   No results found for this or any previous visit (from the past 24 hour(s)).

## 2023-03-27 NOTE — PLAN OF CARE
Summary: POD 4 lap appe for perforated appendicitis with postop ileus   Hx: HTN, GIULIA, floppy eyelid syndrome of right eye  Orientation/Cognitive: AOX4  Observation Goals (Met/ Not Met): Inpatient  Mobility Level/Assist Equipment: Ind. Ambulating frequently.  Fall Risk (Y/N): N  Behavior Concerns: none  Pain Management: Pain managed with abd binder & hot packs  Tele/VS/O2: VSS on RA ex tachycardia 100s. CPAP when sleeping.  ABNL Lab/BG: K 3.3. Replaced & redraw in AM.  Diet: Tolerating sips clear liquid diet.  Bowel/Bladder: Cont of B&B. Had 6 loose BMs. BS hypoactive. Denies N/V  Skin Concerns: 2 abd lap sites with ster-strips CDI and ASHLEY with moderate serous/yellow output. Stripped ASHLEY this shift per MD order.   Drains/Devices: PIV infusing LR at 125mL/hr & int ABX. NG removed by pt last night.   Tests/Procedures for next shift: Plan for clears today and advance diet tomorrow if tolerating. MD noted to monitor for N/V & replace NG if this occurs.  Anticipated DC date & active delays: Discharge home in 2+ days pending improvement

## 2023-03-27 NOTE — PROVIDER NOTIFICATION
"MD Notification    Notified Person: MD    Notified Person Name: Carl Vincent    Notification Date/Time: 03/27/23  4:16 AM    Notification Interaction: amcom    Purpose of Notification: \"FYI NG tube came out. Pt is not nauseous and wanted to ask for tube out today anyways.\"    Orders Received:    Comments:    "

## 2023-03-27 NOTE — PROGRESS NOTES
General Surgery Progress Note    Admission Date: 3/23/2023  Today's Date: 3/27/2023         Assessment:      Efrain Lock is a 47 year old male with perforated appendicitis and peritonitis POD 4 s/p laparoscopic appendectomy with postop ileus, as expected.         Plan:   - OK to keep NG out for now, but monitor closely. If recurrent nausea/emesis, replace NG tube  - Ice chips and small sips of water/clears today. If tolerating well with NG out, can increase PO intake tomorrow. Continue maintenance IV fluids. Creatinine 1.53 today  - +Bowel function, await resolution of ileus. Do not give PO bowel meds  - Frequent out of bed, ambulation, PCDs while resting. Patient declining subQ heparin, will discontinue   - Continue Zosyn. Abdominal fluid cultures with e coli and 2 anaerobes. Urine culture with no growth. Likely transition to Augmentin at discharge.   - Denies significant abdominal pain. Meds available, not needing  - Continue ASHLEY drain, strip every shift. Anticipate removal prior to discharge  - Medical management per hospitalist, appreciate assistance    Dispo: likely 2 more days in hospital for monitoring, recovery, resolution of ileus, diet advancement        Interval History:   Afebrile, pulse low 100s, blood pressures slightly elevated. No O2 requirement. NG accidentally removed overnight, patient requesting to keep it out. Currently free of nausea. Had 2.7L out from NG after placement yesterday. Has had 3 bowel movements since 0130, first was somewhat solid but the other 2 were liquid. Denies any true pain, only having some abdominal discomfort. Requests abdominal binder. Does not want any pain meds including tylenol or simethicone.          Physical Exam:   BP (!) 153/95 (BP Location: Right arm, Patient Position: Semi-Oliveira's, Cuff Size: Adult Large)   Pulse 110   Temp 98.6  F (37  C) (Oral)   Resp 16   Wt 107.8 kg (237 lb 10.5 oz)   SpO2 100%   BMI 39.55 kg/m    I/O last 3 completed shifts:  In:  -   Out: 2990 [Emesis/NG output:2700; Drains:290]  General: NAD, pleasant, alert and oriented x3  Cardiovascular: regular rhythm, rate 100; S1 and S2 distinct without murmur   Respiratory: lungs clear to auscultation bilaterally without wheezes, rales or rhonchi   Abdomen: rotund and distended today but overall soft. Appropriately tender at incisions. ASHLEY drain in place with fairly clear serous fluid in bulb  Incisions: intact with steri strips. No erythema or drainage  Extremities: No lower extremity edema, no calf tenderness    LABS:  Recent Labs   Lab Test 03/27/23  0711 03/26/23  0606 03/25/23  0627   WBC 11.1* 11.3* 13.7*   HGB 14.3 14.1 13.5   MCV 87 87 88    302 241      Recent Labs   Lab Test 03/27/23  0711 03/26/23  0606 03/25/23  0627   POTASSIUM 3.3* 3.3* 3.5   CHLORIDE 105 105 107   CO2 24 24 23   BUN 18.5 17.1 12.4   CR 1.53* 1.41* 1.45*   ANIONGAP 14 12 10     -------------------------------    Jennifer Woody PA-C  [facility]  [phone]

## 2023-03-27 NOTE — PLAN OF CARE
Goal Outcome Evaluation:    Orientation/Cognitive: AOX4  Observation Goals (Met/ Not Met): inpatient  Mobility Level/Assist Equipment: ind  Fall Risk (Y/N): N  Behavior Concerns: none  Pain Management: Scheduled simethicone  Tele/VS/O2: VSS on RA ex tachycardia  ABNL Lab/BG: creat 1.41, K 3.3, , Refused 0200 BG check  Diet: NPO ex ice and meds  Bowel/Bladder: continent, adequate UOP. One BM this shift  Skin Concerns: abd lap sites and ASHLEY CDI  Drains/Devices: PIV infusing LR at 125mL/hr, ASHLEY drain to bulb suction. NG to LIS most of shift, dislodged at 0400.   Tests/Procedures for next shift: AM labs   Anticipated DC date & active delays: TBD pending improvement  Patient Stated Goal for Today: sleep

## 2023-03-28 LAB
ANION GAP SERPL CALCULATED.3IONS-SCNC: 12 MMOL/L (ref 7–15)
ATRIAL RATE - MUSE: 102 BPM
BUN SERPL-MCNC: 15.4 MG/DL (ref 6–20)
CALCIUM SERPL-MCNC: 8.4 MG/DL (ref 8.6–10)
CHLORIDE SERPL-SCNC: 100 MMOL/L (ref 98–107)
CREAT SERPL-MCNC: 1.37 MG/DL (ref 0.67–1.17)
DEPRECATED HCO3 PLAS-SCNC: 25 MMOL/L (ref 22–29)
DIASTOLIC BLOOD PRESSURE - MUSE: NORMAL MMHG
GFR SERPL CREATININE-BSD FRML MDRD: 63 ML/MIN/1.73M2
GLUCOSE BLDC GLUCOMTR-MCNC: 102 MG/DL (ref 70–99)
GLUCOSE BLDC GLUCOMTR-MCNC: 102 MG/DL (ref 70–99)
GLUCOSE BLDC GLUCOMTR-MCNC: 112 MG/DL (ref 70–99)
GLUCOSE SERPL-MCNC: 90 MG/DL (ref 70–99)
INTERPRETATION ECG - MUSE: NORMAL
P AXIS - MUSE: 55 DEGREES
POTASSIUM SERPL-SCNC: 3.3 MMOL/L (ref 3.4–5.3)
POTASSIUM SERPL-SCNC: 3.3 MMOL/L (ref 3.4–5.3)
PR INTERVAL - MUSE: 144 MS
QRS DURATION - MUSE: 98 MS
QT - MUSE: 372 MS
QTC - MUSE: 484 MS
R AXIS - MUSE: 17 DEGREES
SODIUM SERPL-SCNC: 137 MMOL/L (ref 136–145)
SYSTOLIC BLOOD PRESSURE - MUSE: NORMAL MMHG
T AXIS - MUSE: 24 DEGREES
VENTRICULAR RATE- MUSE: 102 BPM

## 2023-03-28 PROCEDURE — 999N000157 HC STATISTIC RCP TIME EA 10 MIN

## 2023-03-28 PROCEDURE — 250N000011 HC RX IP 250 OP 636: Performed by: PHYSICIAN ASSISTANT

## 2023-03-28 PROCEDURE — A7035 POS AIRWAY PRESS HEADGEAR: HCPCS

## 2023-03-28 PROCEDURE — 82310 ASSAY OF CALCIUM: CPT | Performed by: NURSE PRACTITIONER

## 2023-03-28 PROCEDURE — 258N000003 HC RX IP 258 OP 636: Performed by: PHYSICIAN ASSISTANT

## 2023-03-28 PROCEDURE — 250N000013 HC RX MED GY IP 250 OP 250 PS 637

## 2023-03-28 PROCEDURE — 99231 SBSQ HOSP IP/OBS SF/LOW 25: CPT | Performed by: NURSE PRACTITIONER

## 2023-03-28 PROCEDURE — 84132 ASSAY OF SERUM POTASSIUM: CPT | Performed by: NURSE PRACTITIONER

## 2023-03-28 PROCEDURE — 120N000001 HC R&B MED SURG/OB

## 2023-03-28 PROCEDURE — 250N000013 HC RX MED GY IP 250 OP 250 PS 637: Performed by: PHYSICIAN ASSISTANT

## 2023-03-28 PROCEDURE — 36415 COLL VENOUS BLD VENIPUNCTURE: CPT | Performed by: NURSE PRACTITIONER

## 2023-03-28 PROCEDURE — 250N000013 HC RX MED GY IP 250 OP 250 PS 637: Performed by: NURSE PRACTITIONER

## 2023-03-28 RX ORDER — POTASSIUM CHLORIDE 1500 MG/1
40 TABLET, EXTENDED RELEASE ORAL ONCE
Status: COMPLETED | OUTPATIENT
Start: 2023-03-28 | End: 2023-03-28

## 2023-03-28 RX ADMIN — POTASSIUM CHLORIDE 40 MEQ: 1500 TABLET, EXTENDED RELEASE ORAL at 09:43

## 2023-03-28 RX ADMIN — LOTEPREDNOL ETABONATE 1 DROP: 5 SUSPENSION/ DROPS OPHTHALMIC at 18:18

## 2023-03-28 RX ADMIN — OXYCODONE HYDROCHLORIDE 10 MG: 5 TABLET ORAL at 22:35

## 2023-03-28 RX ADMIN — LOTEPREDNOL ETABONATE 2 DROP: 5 SUSPENSION/ DROPS OPHTHALMIC at 20:33

## 2023-03-28 RX ADMIN — PIPERACILLIN AND TAZOBACTAM 3.38 G: 3; .375 INJECTION, POWDER, FOR SOLUTION INTRAVENOUS at 18:18

## 2023-03-28 RX ADMIN — AMLODIPINE BESYLATE 10 MG: 10 TABLET ORAL at 08:03

## 2023-03-28 RX ADMIN — SIMETHICONE 80 MG: 80 TABLET, CHEWABLE ORAL at 08:02

## 2023-03-28 RX ADMIN — FAMOTIDINE 20 MG: 20 TABLET ORAL at 20:02

## 2023-03-28 RX ADMIN — LOTEPREDNOL ETABONATE 2 DROP: 5 SUSPENSION/ DROPS OPHTHALMIC at 08:04

## 2023-03-28 RX ADMIN — FAMOTIDINE 20 MG: 20 TABLET ORAL at 08:03

## 2023-03-28 RX ADMIN — ATORVASTATIN CALCIUM 20 MG: 20 TABLET, FILM COATED ORAL at 08:03

## 2023-03-28 RX ADMIN — PIPERACILLIN AND TAZOBACTAM 3.38 G: 3; .375 INJECTION, POWDER, FOR SOLUTION INTRAVENOUS at 04:43

## 2023-03-28 RX ADMIN — PIPERACILLIN AND TAZOBACTAM 3.38 G: 3; .375 INJECTION, POWDER, FOR SOLUTION INTRAVENOUS at 10:30

## 2023-03-28 RX ADMIN — PIPERACILLIN AND TAZOBACTAM 3.38 G: 3; .375 INJECTION, POWDER, FOR SOLUTION INTRAVENOUS at 22:37

## 2023-03-28 RX ADMIN — SIMETHICONE 80 MG: 80 TABLET, CHEWABLE ORAL at 20:30

## 2023-03-28 RX ADMIN — SIMETHICONE 80 MG: 80 TABLET, CHEWABLE ORAL at 11:39

## 2023-03-28 RX ADMIN — SODIUM CHLORIDE, POTASSIUM CHLORIDE, SODIUM LACTATE AND CALCIUM CHLORIDE: 600; 310; 30; 20 INJECTION, SOLUTION INTRAVENOUS at 06:57

## 2023-03-28 RX ADMIN — SIMETHICONE 80 MG: 80 TABLET, CHEWABLE ORAL at 16:32

## 2023-03-28 RX ADMIN — POTASSIUM CHLORIDE 40 MEQ: 1500 TABLET, EXTENDED RELEASE ORAL at 19:03

## 2023-03-28 RX ADMIN — LOTEPREDNOL ETABONATE 2 DROP: 5 SUSPENSION/ DROPS OPHTHALMIC at 11:39

## 2023-03-28 ASSESSMENT — ACTIVITIES OF DAILY LIVING (ADL)
ADLS_ACUITY_SCORE: 18

## 2023-03-28 NOTE — PROGRESS NOTES
Summary: POD 5 lap appy for perforated appendicitis with postop ileus   Hx: HTN, GIULIA, floppy eyelid syndrome of right eye  Orientation/Cognitive: AOX4  Observation Goals (Met/ Not Met): Inpatient  Mobility Level/Assist Equipment: Ind. Ambulating frequently in the hallway.  Fall Risk (Y/N): N  Behavior Concerns: none  Pain Management: Pain managed with abd binder & hot packs. Simethicone for gas given x2.   Tele/VS/O2: VSS on RA ex tachycardia at times.   ABNL Lab/BG: K 3.3. Replaced, recheck later today,Creat 1.37, , ca 8.4  Diet: Full liquid diet.  Bowel/Bladder: Cont of B&B. Had 3 loose BMs so far. BS hypoactive.   Skin Concerns: Abd lap sites with ster-strips CDI and ASHLEY with moderate serous/yellow output.  Drains/Devices: PIV SL w/ int ABX. NG removed by pt on 3/26  Tests/Procedures for next shift: Plan for fulls today and advance diet tomorrow if tolerating. MD noted to monitor for N/V & replace NG if this occurs.  Anticipated DC date & active delays: Discharge home in 1-2days pending improvement.  Pt is transferring to Gen Surg  at this time w/ all pt's belonging. Pt's Family member accompanied the pt.

## 2023-03-28 NOTE — PROGRESS NOTES
[facility]    Medicine Progress Note - Hospitalist Service    Date of Admission:  3/23/2023    Assessment & Plan   Efrain Lock is a 47 year old male with a PMH significant for DM type II, and HTN, admitted on 3/23/2023 with perforated appendicitis and peritonitis status post laparoscopic appendectomy.    Perforated appendicitis and peritonitis status post laparoscopic appendectomy 3/23/23 with Dr. Zabala  Pt presented to ED 3/23 with abdominal pain, found to have perforated appendicitis and peritonitis on CT. Started on zosyn and was taken urgently to the OR. Procedure was performed under general anesthesia with an EBL of 5ml.   *peritoneal fluid growing E.Coli  -ASHLEY remains in place. strip every shift. Anticipate removal prior to discharge  -Defer postoperative management of IV fluids, antibiotics, PT/OT, and anticoagulation to primary surgical team.  -Continue Zosyn. Abdominal fluid cultures with e coli and 2 anaerobes. Likely transition to Augmentin at discharge.   -Frequent out of bed, ambulate in halls at least QID, PCDs while resting. Patient declining subQ heparin, so this was discontinued  -Full liquid diet, advance as tolerated. Solid food later tonight vs tomorrow    Abnormal UA   UA on admission remarkable for moderate leukocyte esterase. Culture pending.   *UC showing no growth      MAGEN (improving)   Baseline creatinine 1.04-1.19. 2 months ago creatinine 1.29. With gradual increase in creatinine, and underlying DM2, HTN, and hx of microalbuminuria there is some concern patient may have some underlying CKD. Prior to admission patient reports he did not eat for 2 days.  Creatinine 1.56 on admission, Cr 1.37  - strict I/Os   - Avoid nephrotoxins including NSAIDs  - follow BMP in am  --will place hold on losartan for now   -encourage po fluid intake      Hypokalemia  -replete as needed  -trend level     DM2  HgbA1C 6.5. PTA regimen includes metformin 500 mg BID and Ozempic 1 mg  once a week.   - Hold metformin and Ozempic while hospitalized  - BG checks BID  -discontinue sliding scale insulin due to patient not receiving any insulin during hospitalization. Nursing communication order place if blood glucose elevates over 150 notify provider.     Hypertension  Hyperlipidemia  PTA regimen includes amlodipine 10 mg daily, losartan 100 mg daily, and atorvastatin 20mg daily. Patient reports compliance with medications. BPs this admission  -continue PTA amlodipine with hold parameters  --hold PTA losartan given MAGEN          Diet: Advance Diet as Tolerated: Full Liquid Diet; Moderate Consistent Carb (60 g CHO per Meal) Diet    DVT Prophylaxis: Ambulate every shift  Gomez Catheter: Not present  Lines: None     Cardiac Monitoring: None  Code Status: Full Code      Clinically Significant Risk Factors        # Hypokalemia: Lowest K = 3.3 mmol/L in last 2 days, will replace as needed                # DMII: A1C = 6.5 % (Ref range: 0.0 - 5.6 %) within past 6 months           Disposition Plan      Expected Discharge Date: 03/29/2023,  3:00 PM    Destination: home with family  Discharge Comments: Plan to stay another night.  Surgery primary,        The patient's care was discussed with the Attending Physician, Dr. Lacy .    Vanessa Mart, CNP  Hospitalist Service  [facility]  Securely message with [employer] (more info)  Text page via [employer] Paging/Directory   ______________________________________________________________________    Interval History   -No acute distress noted.  -Cr improving     Physical Exam   Vital Signs: Temp: 98.2  F (36.8  C) Temp src: Oral BP: 124/86 Pulse: 98   Resp: 18 SpO2: 96 % O2 Device: None (Room air)    Weight: 237 lbs 10.49 oz    General: NAD, pleasant, alert and oriented x3  Respiratory: non-labored breathing   Abdomen: soft, less distended  Today. Appropriately tender at incisions. ASHLEY drain in place with clear serous fluid in bulb  Incisions: intact  with steri strips. No erythema or drainage. Some bruising around periumbilical incision.  Extremities: No lower extremity edema, no calf tenderness    Medical Decision Making       25 MINUTES SPENT BY ME on the date of service doing chart review, history, exam, documentation & further activities per the note.  MANAGEMENT DISCUSSED with the following over the past 24 hours: patient and Dr. Lacy        Data     I have personally reviewed the following data over the past 24 hrs:    N/A  \   N/A   / N/A     137 100 15.4 /  90   3.3 (L) 25 1.37 (H) \       Imaging results reviewed over the past 24 hrs:   No results found for this or any previous visit (from the past 24 hour(s)).

## 2023-03-28 NOTE — PROGRESS NOTES
Pt declined CPAP for the night. Pt complains of stomach bloating and pain.     Jenny Tillman, RT

## 2023-03-28 NOTE — PLAN OF CARE
Goal Outcome Evaluation:         Orientation/Cognitive: WDL  Observation Goals (Met/ Not Met): N/A; pt is inpatient  Mobility Level/Assist Equipment: Independent  Fall Risk (Y/N): No  Behavior Concerns: None  Pain Management: Pt with some discomfort from bloating; stated better after being able to have 2 bowel movements post simethicone; and with warm packs; denied pain  Tele/VS/O2: AVSS; RA; no Tele  ABNL Lab/BG: WBC 11.1; K+ 3.3 - replaced - recheck pending this am; Cr 1.53  Diet: Clear liquid diet  Bowel/Bladder: Voiding; loose green stool x 1; yellow, watery stool x 1; hypoactive bowel sounds  Skin Concerns: Abdominal surgical incisions with dressings clean, dry, and intact  Drains/Devices: ASHLEY with serous drainage  Tests/Procedures for next shift: Monitor labs and GI status  Anticipated DC date & active delays: To be decided  Patient Stated Goal for Today: Get some relief from abdominal bloating.     Abdominal binder in place. Pt tolerating clear liquids with no c/o nausea/no emesis. LR at 125 ml/hr; continues on zosyn.

## 2023-03-28 NOTE — PROGRESS NOTES
General Surgery Progress Note    Admission Date: 3/23/2023  Today's Date: 3/28/2023         Assessment:      Efrain Lock is a 47 year old male with perforated appendicitis and peritonitis POD 5 s/p laparoscopic appendectomy with postop ileus, as expected - improving         Plan:   - Full liquid diet, advance as tolerated. Solid food later tonight vs tomorrow  - Saline lock IV fluids, PO intake adequate  - Renal function improving, continue to monitor  - +loose BMs, continue to hold any PO bowel movements, await resolution of ileus  - Frequent out of bed, ambulate in halls at least QID, PCDs while resting. Patient declining subQ heparin, so this was discontinued  - Continue Zosyn. Abdominal fluid cultures with e coli and 2 anaerobes. Urine culture with no growth. May transition to Augmentin at discharge pending length of hospital stay  - Denies significant abdominal pain. Meds available, not needing  - Continue ASHLEY drain, strip every shift. Anticipate removal prior to discharge  - Medical management per hospitalist, appreciate assistance     Dispo: likely home tomorrow vs the day after pending continued improvement, tolerance of diet advancement, medical clearance         Interval History:   Afebrile overnight, pulse 90s-low 100s. Blood pressures stable with intermittent hypertension, no O2 requirement. Efrain overall feels much better today. Had numerous loose stools yesterday. Took in a good amount of clear liquids, denies any nausea. Abdomen feels sore, but no severe pain. Up walking well, urinating fine. ASHLEY drain with >200cc out x 24 hours.          Physical Exam:   /86 (BP Location: Right arm, Patient Position: Semi-Oliveira's)   Pulse 98   Temp 98.2  F (36.8  C) (Oral)   Resp 18   Wt 107.8 kg (237 lb 10.5 oz)   SpO2 96%   BMI 39.55 kg/m    I/O last 3 completed shifts:  In: 5968.33 [P.O.:860; I.V.:5108.33]  Out: 205 [Drains:205]  General: NAD, pleasant, alert and oriented x3  Respiratory:  non-labored breathing   Abdomen: soft, less distended  Today. Appropriately tender at incisions. ASHLEY drain in place with clear serous fluid in bulb  Incisions: intact with steri strips. No erythema or drainage. Some bruising around periumbilical incision.  Extremities: No lower extremity edema, no calf tenderness    LABS:  Recent Labs   Lab Test 03/27/23  0711 03/26/23  0606 03/25/23  0627   WBC 11.1* 11.3* 13.7*   HGB 14.3 14.1 13.5   MCV 87 87 88    302 241      Recent Labs   Lab Test 03/28/23  0640 03/27/23  0711 03/26/23  0606   POTASSIUM 3.3* 3.3* 3.3*   CHLORIDE 100 105 105   CO2 25 24 24   BUN 15.4 18.5 17.1   CR 1.37* 1.53* 1.41*   ANIONGAP 12 14 12     -------------------------------    Jennifer Woody PA-C  [facility]  [phone]

## 2023-03-28 NOTE — PROGRESS NOTES
Summary: POD 4 lap appy for perforated appendicitis with postop ileus   Hx: HTN, GIULIA, floppy eyelid syndrome of right eye  Orientation/Cognitive: AOX4  Observation Goals (Met/ Not Met): Inpatient  Mobility Level/Assist Equipment: Ind. Ambulating frequently in the hallway.  Fall Risk (Y/N): N  Behavior Concerns: none  Pain Management: Pain managed with abd binder & hot packs. Simethicone for gas given x1.   Tele/VS/O2: VSS on RA ex tachycardia at times. Refusing CPAP tonight  ABNL Lab/BG: K 3.3. Replaced & redraw in AM. Creat 1.53  Diet: Tolerating sips, clear liquid diet.  Bowel/Bladder: Cont of B&B. Had 6 loose BMs. BS hypoactive.   Skin Concerns: 2 abd lap sites with ster-strips CDI and ASHLEY with small serous/yellow output.  Drains/Devices: PIV infusing LR at 125mL/hr & int ABX. NG removed by pt last night 3/26  Tests/Procedures for next shift: Plan for clears today and advance diet tomorrow if tolerating. MD noted to monitor for N/V & replace NG if this occurs.  Anticipated DC date & active delays: Discharge home in 2+ days pending improvement

## 2023-03-29 LAB
ANION GAP SERPL CALCULATED.3IONS-SCNC: 14 MMOL/L (ref 7–15)
BUN SERPL-MCNC: 12.2 MG/DL (ref 6–20)
CALCIUM SERPL-MCNC: 8.5 MG/DL (ref 8.6–10)
CHLORIDE SERPL-SCNC: 104 MMOL/L (ref 98–107)
CREAT SERPL-MCNC: 1.3 MG/DL (ref 0.67–1.17)
DEPRECATED HCO3 PLAS-SCNC: 21 MMOL/L (ref 22–29)
ERYTHROCYTE [DISTWIDTH] IN BLOOD BY AUTOMATED COUNT: 13 % (ref 10–15)
GFR SERPL CREATININE-BSD FRML MDRD: 67 ML/MIN/1.73M2
GLUCOSE BLDC GLUCOMTR-MCNC: 93 MG/DL (ref 70–99)
GLUCOSE SERPL-MCNC: 94 MG/DL (ref 70–99)
HCT VFR BLD AUTO: 41 % (ref 40–53)
HGB BLD-MCNC: 13.5 G/DL (ref 13.3–17.7)
MCH RBC QN AUTO: 28.7 PG (ref 26.5–33)
MCHC RBC AUTO-ENTMCNC: 32.9 G/DL (ref 31.5–36.5)
MCV RBC AUTO: 87 FL (ref 78–100)
PLATELET # BLD AUTO: 367 10E3/UL (ref 150–450)
POTASSIUM SERPL-SCNC: 3.4 MMOL/L (ref 3.4–5.3)
POTASSIUM SERPL-SCNC: 3.6 MMOL/L (ref 3.4–5.3)
RBC # BLD AUTO: 4.7 10E6/UL (ref 4.4–5.9)
SODIUM SERPL-SCNC: 139 MMOL/L (ref 136–145)
WBC # BLD AUTO: 11.4 10E3/UL (ref 4–11)

## 2023-03-29 PROCEDURE — 36415 COLL VENOUS BLD VENIPUNCTURE: CPT | Performed by: PHYSICIAN ASSISTANT

## 2023-03-29 PROCEDURE — 85027 COMPLETE CBC AUTOMATED: CPT | Performed by: PHYSICIAN ASSISTANT

## 2023-03-29 PROCEDURE — 250N000013 HC RX MED GY IP 250 OP 250 PS 637

## 2023-03-29 PROCEDURE — 99231 SBSQ HOSP IP/OBS SF/LOW 25: CPT | Performed by: NURSE PRACTITIONER

## 2023-03-29 PROCEDURE — 250N000013 HC RX MED GY IP 250 OP 250 PS 637: Performed by: PHYSICIAN ASSISTANT

## 2023-03-29 PROCEDURE — 120N000001 HC R&B MED SURG/OB

## 2023-03-29 PROCEDURE — 82947 ASSAY GLUCOSE BLOOD QUANT: CPT | Performed by: PHYSICIAN ASSISTANT

## 2023-03-29 PROCEDURE — 999N000127 HC STATISTIC PERIPHERAL IV START W US GUIDANCE

## 2023-03-29 PROCEDURE — 250N000011 HC RX IP 250 OP 636: Performed by: PHYSICIAN ASSISTANT

## 2023-03-29 RX ORDER — ACETAMINOPHEN 325 MG/1
650 TABLET ORAL EVERY 6 HOURS
Status: DISCONTINUED | OUTPATIENT
Start: 2023-03-29 | End: 2023-03-30

## 2023-03-29 RX ORDER — CYCLOBENZAPRINE HCL 5 MG
5-10 TABLET ORAL 3 TIMES DAILY
Status: DISCONTINUED | OUTPATIENT
Start: 2023-03-29 | End: 2023-03-30

## 2023-03-29 RX ADMIN — ACETAMINOPHEN 650 MG: 325 TABLET, FILM COATED ORAL at 11:17

## 2023-03-29 RX ADMIN — PIPERACILLIN AND TAZOBACTAM 3.38 G: 3; .375 INJECTION, POWDER, FOR SOLUTION INTRAVENOUS at 16:23

## 2023-03-29 RX ADMIN — SIMETHICONE 80 MG: 80 TABLET, CHEWABLE ORAL at 13:17

## 2023-03-29 RX ADMIN — SIMETHICONE 80 MG: 80 TABLET, CHEWABLE ORAL at 08:31

## 2023-03-29 RX ADMIN — FAMOTIDINE 20 MG: 20 TABLET ORAL at 20:56

## 2023-03-29 RX ADMIN — CYCLOBENZAPRINE 5 MG: 5 TABLET, FILM COATED ORAL at 16:22

## 2023-03-29 RX ADMIN — PIPERACILLIN AND TAZOBACTAM 3.38 G: 3; .375 INJECTION, POWDER, FOR SOLUTION INTRAVENOUS at 05:08

## 2023-03-29 RX ADMIN — METHOCARBAMOL 750 MG: 750 TABLET ORAL at 08:31

## 2023-03-29 RX ADMIN — PIPERACILLIN AND TAZOBACTAM 3.38 G: 3; .375 INJECTION, POWDER, FOR SOLUTION INTRAVENOUS at 23:49

## 2023-03-29 RX ADMIN — LOTEPREDNOL ETABONATE 2 DROP: 5 SUSPENSION/ DROPS OPHTHALMIC at 20:58

## 2023-03-29 RX ADMIN — AMLODIPINE BESYLATE 10 MG: 10 TABLET ORAL at 08:26

## 2023-03-29 RX ADMIN — FAMOTIDINE 20 MG: 20 TABLET ORAL at 08:26

## 2023-03-29 RX ADMIN — PIPERACILLIN AND TAZOBACTAM 3.38 G: 3; .375 INJECTION, POWDER, FOR SOLUTION INTRAVENOUS at 11:17

## 2023-03-29 RX ADMIN — CYCLOBENZAPRINE 5 MG: 5 TABLET, FILM COATED ORAL at 23:49

## 2023-03-29 RX ADMIN — LOTEPREDNOL ETABONATE 2 DROP: 5 SUSPENSION/ DROPS OPHTHALMIC at 08:26

## 2023-03-29 RX ADMIN — METHOCARBAMOL 750 MG: 750 TABLET ORAL at 15:22

## 2023-03-29 RX ADMIN — CYCLOBENZAPRINE 5 MG: 5 TABLET, FILM COATED ORAL at 11:17

## 2023-03-29 RX ADMIN — ATORVASTATIN CALCIUM 20 MG: 20 TABLET, FILM COATED ORAL at 08:26

## 2023-03-29 RX ADMIN — ACETAMINOPHEN 650 MG: 325 TABLET, FILM COATED ORAL at 15:22

## 2023-03-29 RX ADMIN — ACETAMINOPHEN 650 MG: 325 TABLET, FILM COATED ORAL at 20:56

## 2023-03-29 ASSESSMENT — ACTIVITIES OF DAILY LIVING (ADL)
ADLS_ACUITY_SCORE: 18

## 2023-03-29 NOTE — PLAN OF CARE
Goal Outcome Evaluation: Pt A/O x4, VSS c/o ABD distention and discomfort.s/p lap appy, developed ileus. Pain/gas managed with PRN Simethicone and Robaxin. Also has scheduled Flexeril and tylenol. NO bowel sounds...No gas today. Pt sticking to clear liquids-can have fulls. Denies any nausea. No PIV today. Pt ambulates in hallway IND.

## 2023-03-29 NOTE — PLAN OF CARE
Summary: POD#6 for lap appy   Orientation: A&Ox4  Activity Level: Assistx1  Behavior & Aggression: Green  Pain Management: Pt had abdomen discomfort. Gave simethicone and oxycodone   ABNL VS/O2: VSS on RA  Diet: Full liquid   Bowel/Bladder: No BM overnight, voids adequately  Drains/Devices: L PIV, R ASHLEY drain   arthralgia/arthritis

## 2023-03-29 NOTE — PROGRESS NOTES
[facility]    Medicine Progress Note - Hospitalist Service    Date of Admission:  3/23/2023    Assessment & Plan   Efrain Lock is a 47 year old male with a PMH significant for DM type II, and HTN, admitted on 3/23/2023 with perforated appendicitis and peritonitis status post laparoscopic appendectomy.    Perforated appendicitis and peritonitis status post laparoscopic appendectomy 3/23/23 with Dr. Zabala  Pt presented to ED 3/23 with abdominal pain, found to have perforated appendicitis and peritonitis on CT. Started on zosyn and was taken urgently to the OR. Procedure was performed under general anesthesia with an EBL of 5ml.   *peritoneal fluid growing E.Coli  -ASHLEY remains in place. strip every shift. Anticipate removal prior to discharge  -Continue Zosyn. Abdominal fluid cultures with e coli and 2 anaerobes. Likely transition to Augmentin at discharge.   -Frequent out of bed, ambulate in halls at least QID, PCDs while resting. Patient declining subQ heparin, so this was discontinued  -Go slow with oral intake. Mainly focus on clear liquids, but OK for a few fulls/solid food as tolerated. Efrain is very reasonable and going slowly  - IV fluids are saline locked. If PO fluid intake decreases, can resume maintenance IV fluids if needed  -Add flexeril and tylenol for abdominal pain, oxy available prn  -+loose BMs, continue to hold any PO bowel movements, await resolution of ileus    Abnormal UA   UA on admission remarkable for moderate leukocyte esterase. Culture pending.   *UC showing no growth      MAGEN (improving)   Baseline creatinine 1.04-1.19. 2 months ago creatinine 1.29. With gradual increase in creatinine, and underlying DM2, HTN, and hx of microalbuminuria there is some concern patient may have some underlying CKD. Prior to admission patient reports he did not eat for 2 days.  Creatinine 1.56 on admission, Cr 1.30  - strict I/Os   - Avoid nephrotoxins including NSAIDs  - follow BMP  in am  --will place hold on losartan for now   -encourage po fluid intake      Hypokalemia  -replete as needed  -trend level     DM2  HgbA1C 6.5. PTA regimen includes metformin 500 mg BID and Ozempic 1 mg once a week.   - Hold metformin and Ozempic while hospitalized  - BG checks BID  -discontinue sliding scale insulin due to patient not receiving any insulin during hospitalization. Nursing communication order place if blood glucose elevates over 150 notify provider.     Hypertension  Hyperlipidemia  PTA regimen includes amlodipine 10 mg daily, losartan 100 mg daily, and atorvastatin 20mg daily. Patient reports compliance with medications. BPs this admission  -continue PTA amlodipine with hold parameters  --hold PTA losartan given MAGEN            Diet: Advance Diet as Tolerated: Full Liquid Diet; Moderate Consistent Carb (60 g CHO per Meal) Diet    DVT Prophylaxis: Defer to primary service  Gomez Catheter: Not present  Lines: None     Cardiac Monitoring: None  Code Status: Full Code      Clinically Significant Risk Factors        # Hypokalemia: Lowest K = 3.3 mmol/L in last 2 days, will replace as needed                # DMII: A1C = 6.5 % (Ref range: 0.0 - 5.6 %) within past 6 months           Disposition Plan     Expected Discharge Date: 03/29/2023,  3:00 PM    Destination: home with family  Discharge Comments: Plan to stay another night.  Surgery primary,        The patient's care was discussed with the Attending Physician, Dr. Lacy.    Vanessa Mart, CNP  Hospitalist Service  [facility]  Securely message with [employer] (more info)  Text page via [employer] Paging/Directory   ______________________________________________________________________    Interval History   -patient slow oral intake.  -creatinine improving  -NAD noted     Physical Exam   Vital Signs: Temp: 98.3  F (36.8  C) Temp src: Oral BP: (!) 149/84 Pulse: 85   Resp: 17 SpO2: 95 % O2 Device: None (Room air)    Weight: 237 lbs  10.49 oz    General: NAD, pleasant, alert and oriented x3  Respiratory: non-labored breathing   Abdomen: soft, less distended Today. Appropriately tender at incisions. ASHLEY drain in place with clear serous fluid in bulb  Incisions: intact with steri strips. No erythema or drainage. Some bruising around periumbilical incision.  Extremities: No lower extremity edema, no calf tenderness          Medical Decision Making       25 MINUTES SPENT BY ME on the date of service doing chart review, history, exam, documentation & further activities per the note.  MANAGEMENT DISCUSSED with the following over the past 24 hours: patient and Dr. Lacy        Data     I have personally reviewed the following data over the past 24 hrs:    11.4 (H)  \   13.5   / 367     139 104 12.2 /  93   3.6 21 (L) 1.30 (H) \       Imaging results reviewed over the past 24 hrs:   No results found for this or any previous visit (from the past 24 hour(s)).

## 2023-03-29 NOTE — PLAN OF CARE
Goal Outcome Evaluation:       Orientation: A+Ox4    Vitals/Tele: BP elevated 145/86, on RA    IV Access/drains; PIV, SL, ASHLEY drain     Diet: Full liquids, pt reports stomach fullness when he eats    Mobility: Indp    GI/; Continent, hypoactive BS, reports gas pain, PRN simethicone given x1     Wound/Skin: abd incisions, CDI, ASHLEY site    Discharge Plan: Pending     Others. K 3.3, replaced.     See Flow sheets for assessment

## 2023-03-29 NOTE — PROGRESS NOTES
General Surgery Progress Note    Admission Date: 3/23/2023  Today's Date: 3/29/2023         Assessment:      Efrain Lock is a 47 year old male with perforated appendicitis and peritonitis POD 6 s/p laparoscopic appendectomy with postop ileus, as expected - making slow progress         Plan:   - Remain in hospital for monitoring  - Go slow with oral intake. Mainly focus on clear liquids, but OK for a few fulls/solid food as tolerated. Efrain is very reasonable and going slowly  - IV fluids are saline locked. If PO fluid intake decreases, can resume maintenance IV fluids if needed  - +loose BMs, continue to hold any PO bowel movements, await resolution of ileus  - Frequent out of bed, ambulate in halls at least QID, PCDs while resting. Patient declining subQ heparin, so this was discontinued  - Continue Zosyn. Abdominal fluid cultures with e coli and 2 anaerobes. Urine culture with no growth. May transition to Augmentin at discharge pending length of hospital stay  - Add flexeril and tylenol for abdominal pain, oxy available prn  - Continue ASHLEY drain, strip every shift. Anticipate removal prior to discharge  - Medical management per hospitalist, appreciate assistance     Dispo: possibly home tomorrow vs the day after pending continued improvement, tolerance of diet advancement, medical clearance         Interval History:   Afebrile, pulse 80s-90s (improved from 100s previously). Slight hypertension, no O2 requirement.  Overall Efrain reports having a rough day yesterday. He is feeling quite bloated and was having more crampy/generalized abdominal pain. He did finally take one oxycodone for relief. He has been walking around well, drinking plenty of fluids. He is going slowly with diet, notices that he feels full quickly after eating. Denies any nausea or vomiting. Drain output slowly decreasing.          Physical Exam:   BP (!) 149/84 (BP Location: Right arm)   Pulse 85   Temp 98.3  F (36.8  C) (Oral)   Resp 17    Wt 107.8 kg (237 lb 10.5 oz)   SpO2 95%   BMI 39.55 kg/m    I/O last 3 completed shifts:  In: 240 [P.O.:240]  Out: 125 [Drains:125]  General: NAD, pleasant, alert and oriented x3  Respiratory: non-labored breathing   Abdomen: distended but soft. Appropriately tender at incisions, a bit more tender to palpation today. No focal tenderness. ASHLEY drain in place with clear serous fluid in bulb  Incisions: intact with steri strips. No erythema or drainage. Some bruising around periumbilical incision.  Extremities: No lower extremity edema, no calf tenderness    LABS:  Recent Labs   Lab Test 03/29/23  0553 03/27/23  0711 03/26/23  0606   WBC 11.4* 11.1* 11.3*   HGB 13.5 14.3 14.1   MCV 87 87 87    347 302      Recent Labs   Lab Test 03/29/23  0553 03/28/23  2346 03/28/23  1827 03/28/23  0640 03/27/23  0711   POTASSIUM 3.6 3.4 3.3* 3.3* 3.3*   CHLORIDE 104  --   --  100 105   CO2 21*  --   --  25 24   BUN 12.2  --   --  15.4 18.5   CR 1.30*  --   --  1.37* 1.53*   ANIONGAP 14  --   --  12 14   -------------------------------    Jennifer Woody PA-C  [facility]  [phone]

## 2023-03-30 LAB
ANION GAP SERPL CALCULATED.3IONS-SCNC: 14 MMOL/L (ref 7–15)
BACTERIA PRT CULT: ABNORMAL
BACTERIA PRT CULT: ABNORMAL
BUN SERPL-MCNC: 11.4 MG/DL (ref 6–20)
CALCIUM SERPL-MCNC: 8.8 MG/DL (ref 8.6–10)
CHLORIDE SERPL-SCNC: 102 MMOL/L (ref 98–107)
CREAT SERPL-MCNC: 1.22 MG/DL (ref 0.67–1.17)
DEPRECATED HCO3 PLAS-SCNC: 22 MMOL/L (ref 22–29)
ERYTHROCYTE [DISTWIDTH] IN BLOOD BY AUTOMATED COUNT: 12.9 % (ref 10–15)
GFR SERPL CREATININE-BSD FRML MDRD: 73 ML/MIN/1.73M2
GLUCOSE BLDC GLUCOMTR-MCNC: 88 MG/DL (ref 70–99)
GLUCOSE SERPL-MCNC: 96 MG/DL (ref 70–99)
GLUCOSE SERPL-MCNC: 96 MG/DL (ref 70–99)
HCT VFR BLD AUTO: 42.7 % (ref 40–53)
HGB BLD-MCNC: 14 G/DL (ref 13.3–17.7)
MCH RBC QN AUTO: 28.7 PG (ref 26.5–33)
MCHC RBC AUTO-ENTMCNC: 32.8 G/DL (ref 31.5–36.5)
MCV RBC AUTO: 88 FL (ref 78–100)
PLATELET # BLD AUTO: 447 10E3/UL (ref 150–450)
POTASSIUM SERPL-SCNC: 3.7 MMOL/L (ref 3.4–5.3)
RBC # BLD AUTO: 4.88 10E6/UL (ref 4.4–5.9)
SODIUM SERPL-SCNC: 138 MMOL/L (ref 136–145)
WBC # BLD AUTO: 10.1 10E3/UL (ref 4–11)

## 2023-03-30 PROCEDURE — 82310 ASSAY OF CALCIUM: CPT | Performed by: PHYSICIAN ASSISTANT

## 2023-03-30 PROCEDURE — 99232 SBSQ HOSP IP/OBS MODERATE 35: CPT | Performed by: NURSE PRACTITIONER

## 2023-03-30 PROCEDURE — 120N000001 HC R&B MED SURG/OB

## 2023-03-30 PROCEDURE — 36415 COLL VENOUS BLD VENIPUNCTURE: CPT | Performed by: PHYSICIAN ASSISTANT

## 2023-03-30 PROCEDURE — 85027 COMPLETE CBC AUTOMATED: CPT | Performed by: PHYSICIAN ASSISTANT

## 2023-03-30 PROCEDURE — 250N000013 HC RX MED GY IP 250 OP 250 PS 637

## 2023-03-30 PROCEDURE — 250N000013 HC RX MED GY IP 250 OP 250 PS 637: Performed by: PHYSICIAN ASSISTANT

## 2023-03-30 PROCEDURE — 258N000003 HC RX IP 258 OP 636: Performed by: PHYSICIAN ASSISTANT

## 2023-03-30 PROCEDURE — 250N000011 HC RX IP 250 OP 636: Performed by: PHYSICIAN ASSISTANT

## 2023-03-30 RX ORDER — BISACODYL 10 MG
10 SUPPOSITORY, RECTAL RECTAL ONCE
Status: COMPLETED | OUTPATIENT
Start: 2023-03-30 | End: 2023-03-30

## 2023-03-30 RX ORDER — SODIUM CHLORIDE 9 MG/ML
INJECTION, SOLUTION INTRAVENOUS CONTINUOUS
Status: DISCONTINUED | OUTPATIENT
Start: 2023-03-30 | End: 2023-04-01 | Stop reason: HOSPADM

## 2023-03-30 RX ORDER — CYCLOBENZAPRINE HCL 5 MG
5-10 TABLET ORAL EVERY 8 HOURS PRN
Status: DISCONTINUED | OUTPATIENT
Start: 2023-03-30 | End: 2023-04-01 | Stop reason: HOSPADM

## 2023-03-30 RX ORDER — HYDRALAZINE HYDROCHLORIDE 20 MG/ML
5 INJECTION INTRAMUSCULAR; INTRAVENOUS EVERY 6 HOURS PRN
Status: DISCONTINUED | OUTPATIENT
Start: 2023-03-30 | End: 2023-04-01 | Stop reason: HOSPADM

## 2023-03-30 RX ORDER — DIAZEPAM 10 MG/2ML
2.5 INJECTION, SOLUTION INTRAMUSCULAR; INTRAVENOUS EVERY 4 HOURS PRN
Status: DISCONTINUED | OUTPATIENT
Start: 2023-03-30 | End: 2023-04-01 | Stop reason: HOSPADM

## 2023-03-30 RX ADMIN — DIAZEPAM 2.5 MG: 5 INJECTION INTRAMUSCULAR; INTRAVENOUS at 09:07

## 2023-03-30 RX ADMIN — CYCLOBENZAPRINE 5 MG: 5 TABLET, FILM COATED ORAL at 18:12

## 2023-03-30 RX ADMIN — BISACODYL 10 MG: 10 SUPPOSITORY RECTAL at 09:11

## 2023-03-30 RX ADMIN — SIMETHICONE 80 MG: 80 TABLET, CHEWABLE ORAL at 18:12

## 2023-03-30 RX ADMIN — FAMOTIDINE 20 MG: 20 TABLET ORAL at 20:46

## 2023-03-30 RX ADMIN — ATORVASTATIN CALCIUM 20 MG: 20 TABLET, FILM COATED ORAL at 08:58

## 2023-03-30 RX ADMIN — LOTEPREDNOL ETABONATE 2 DROP: 5 SUSPENSION/ DROPS OPHTHALMIC at 20:46

## 2023-03-30 RX ADMIN — PIPERACILLIN AND TAZOBACTAM 3.38 G: 3; .375 INJECTION, POWDER, FOR SOLUTION INTRAVENOUS at 22:50

## 2023-03-30 RX ADMIN — PIPERACILLIN AND TAZOBACTAM 3.38 G: 3; .375 INJECTION, POWDER, FOR SOLUTION INTRAVENOUS at 17:12

## 2023-03-30 RX ADMIN — LOTEPREDNOL ETABONATE 2 DROP: 5 SUSPENSION/ DROPS OPHTHALMIC at 17:12

## 2023-03-30 RX ADMIN — SIMETHICONE 80 MG: 80 TABLET, CHEWABLE ORAL at 02:42

## 2023-03-30 RX ADMIN — PIPERACILLIN AND TAZOBACTAM 3.38 G: 3; .375 INJECTION, POWDER, FOR SOLUTION INTRAVENOUS at 05:20

## 2023-03-30 RX ADMIN — FAMOTIDINE 20 MG: 10 INJECTION INTRAVENOUS at 08:59

## 2023-03-30 RX ADMIN — AMLODIPINE BESYLATE 10 MG: 10 TABLET ORAL at 08:58

## 2023-03-30 RX ADMIN — SODIUM CHLORIDE: 9 INJECTION, SOLUTION INTRAVENOUS at 17:13

## 2023-03-30 RX ADMIN — SODIUM CHLORIDE: 9 INJECTION, SOLUTION INTRAVENOUS at 08:58

## 2023-03-30 RX ADMIN — DIAZEPAM 2.5 MG: 5 INJECTION INTRAMUSCULAR; INTRAVENOUS at 21:12

## 2023-03-30 RX ADMIN — PIPERACILLIN AND TAZOBACTAM 3.38 G: 3; .375 INJECTION, POWDER, FOR SOLUTION INTRAVENOUS at 10:58

## 2023-03-30 RX ADMIN — LOTEPREDNOL ETABONATE 2 DROP: 5 SUSPENSION/ DROPS OPHTHALMIC at 09:02

## 2023-03-30 ASSESSMENT — ACTIVITIES OF DAILY LIVING (ADL)
ADLS_ACUITY_SCORE: 18

## 2023-03-30 NOTE — PLAN OF CARE
[id number]  POD7 of Lap Appy and then post op ileus. A&OX4. VSS on Ra. Pain controlled with IV valium. BS hypo, had X2 BM today after suppository given. Clear liquid sips at the moment. Incisions CDI and ASHLEY dressing CDI with dried drainage. ASHLEY to bulb suctions and stripped q8hr. Up independently. PIV infusing NS @125ml/hr. Plan to await ROBF, possible CT imaging tomorrow. Discharge pending.

## 2023-03-30 NOTE — PROGRESS NOTES
General Surgery Progress Note    Admission Date: 3/23/2023  Today's Date: 3/30/2023         Assessment:      Efrain Lock is a 47 year old male with perforated appendicitis and peritonitis POD 7 s/p laparoscopic appendectomy with postop ileus, as expected - making slow progress.          Plan:   - Continue to monitor. Persistent ileus, lack of bowel function past 24 hours. Consider re-imaging at some point given increasing abdominal pain. Will discuss with Dr. Zabala  - Resume maintenance IV fluids given decrease in PO intake. OK to continue with sips of clear liquids as tolerated  - Transition to IV meds for pain for now, dilaudid and valium available  - Give dulcolax suppository today to stimulate flatus/BM  - Frequent out of bed, ambulate in halls at least QID, PCDs while resting. Patient declining subQ heparin   - Continue Zosyn. Abdominal fluid cultures with e coli and 2 anaerobes. Urine culture with no growth. May transition to Augmentin at discharge pending length of hospital stay  - Continue ASHLEY drain, strip every shift. Anticipate removal prior to discharge  - Medical management per hospitalist, appreciate assistance     Dispo: unclear, likely at least 2 more days in hospital        Interval History:   Afebrile, vitals stable except for hypertension. Efrain had a rough night. He states that he has been trying, but hasn't been able to pass gas now for 1 day. No BM yesterday. His abdominal pain seems to be worsening, meds are helpful but pain is persistent. Feels very bloated and uncomfortable. Has been walking often, taking only sips of liquids now as he has no appetite.          Physical Exam:   /86 (BP Location: Left arm)   Pulse 99   Temp 98.6  F (37  C) (Oral)   Resp 15   Wt 107.8 kg (237 lb 10.5 oz)   SpO2 96%   BMI 39.55 kg/m    I/O last 3 completed shifts:  In: 509 [P.O.:500; I.V.:9]  Out: 85 [Drains:85]  General: NAD, pleasant, alert and oriented x3  Respiratory: non-labored  breathing   Abdomen: distended but soft. Tender to palpation throughout, worst tenderness over RLQ. ASHLEY drain in place with clear serous fluid in bulb  Incisions: intact with steri strips. No erythema or drainage. Some bruising around periumbilical incision.  Extremities: No lower extremity edema, no calf tenderness    LABS:  Recent Labs   Lab Test 03/30/23  0729 03/29/23  0553 03/27/23  0711   WBC 10.1 11.4* 11.1*   HGB 14.0 13.5 14.3   MCV 88 87 87    367 347      Recent Labs   Lab Test 03/29/23  0553 03/28/23  2346 03/28/23  1827 03/28/23  0640 03/27/23  0711   POTASSIUM 3.6 3.4 3.3* 3.3* 3.3*   CHLORIDE 104  --   --  100 105   CO2 21*  --   --  25 24   BUN 12.2  --   --  15.4 18.5   CR 1.30*  --   --  1.37* 1.53*   ANIONGAP 14  --   --  12 14        -------------------------------    Jennifer Woody PA-C  [facility]  [phone]

## 2023-03-30 NOTE — PLAN OF CARE
Goal Outcome Evaluation:      Plan of Care Reviewed With: patient    Overall Patient Progress: no changeOverall Patient Progress: no change    POD 7 from a lap appe, then pt developed ileus. A&O x4. CMS WDL. Bowel sounds hypoactive, not passing flatus. Pt on full liquid diet, but only drinking thin liquids for now. VSS. Dressing intact, abdominal binder in place. ASHLEY to bulb suction, stripped q8 hours, 35 ml yellow output overnight. Up independently. C/o mild pain, decreased with simethicone and tylenol. Plan is to wait for ROBF. Discharge pending.       Taryn Rodriguez is a 66 year old female presenting with pain under skull    C/o: pain under skull on right side   Started 3-5 days ago.   Onset: Gradual.     Symptoms described as shooting pain  Severity reported as n/a.   Symptoms occurring  shooting pain  Aggravated by movement in head .   Alleviated by n/a       Health Maintenance Due   Topic Date Due   • Hepatitis B Vaccine (1 of 3 - Risk 3-dose series) 07/14/1973   • Shingles Vaccine (1 of 2) 07/14/2002   • Diabetes Foot Exam  03/26/2019   • Pneumococcal Vaccine 65+ (2 of 2 - PPSV23) 09/11/2019   • Diabetes Eye Exam  05/16/2020

## 2023-03-30 NOTE — PROGRESS NOTES
[facility]    Medicine Progress Note - Hospitalist Service    Date of Admission:  3/23/2023    Assessment & Plan   Efrain Lock is a 47 year old male with a PMH significant for DM type II, and HTN, admitted on 3/23/2023 with perforated appendicitis and peritonitis status post laparoscopic appendectomy.    Perforated appendicitis and peritonitis status post laparoscopic appendectomy 3/23/23 with Dr. Zabala  EBL of 5ml.   *peritoneal fluid growing E.Coli  -ASHLEY remains in place. strip every shift. Anticipate removal prior to discharge  -Continue Zosyn. Abdominal fluid cultures with e coli and 2 anaerobes. Likely transition to Augmentin at discharge.   -Frequent out of bed, ambulate in halls at least QID, PCDs while resting. Patient declining subQ heparin, so this was discontinued  - Continue to monitor. Persistent ileus, lack of bowel function past 24 hours. repeat CT scan, probably not today.  - Resume maintenance IV fluids given decrease in PO intake. OK to continue with sips of clear liquids as tolerated  - Transition to IV meds for pain for now, dilaudid and valium available  - Give dulcolax suppository today to stimulate flatus/BM        Abnormal UA   UA on admission remarkable for moderate leukocyte esterase. Culture pending.   *UC showing no growth      MAGEN (improving)   Creatinine 1.56 on admission, Cr 1.22  - strict I/Os   - Avoid nephrotoxins including NSAIDs  - follow BMP in am  --will place hold on losartan for now     Hypokalemia (resolved)   -replete as needed  -trend level     DM2  HgbA1C 6.5.-   -PTA metformin and Ozempic Hold while hospitalized  - BG checks BID    Hypertension  Hyperlipidemia  -continue PTA amlodipine with hold parameters  --hold PTA losartan given MAGEN   -added hydralazine as needed              Diet: Clear Liquid Diet    DVT Prophylaxis: Defer to primary service   Gomez Catheter: Not present  Lines: None     Cardiac Monitoring: None  Code Status: Full Code       Clinically Significant Risk Factors        # Hypokalemia: Lowest K = 3.3 mmol/L in last 2 days, will replace as needed                # DMII: A1C = 6.5 % (Ref range: 0.0 - 5.6 %) within past 6 months           Disposition Plan     Expected Discharge Date: 03/30/2023,  3:00 PM    Destination: home with family  Discharge Comments: Plan to stay another night.  Surgery primary,        The patient's care was discussed with the Attending Physician, Dr. Lacy.    Vanessa Mart CNP  Hospitalist Service  [facility]  Securely message with [employer] info)  Text page via [facility] Paging/Directory   ______________________________________________________________________    Interval History   -Patient BP elevated last night likely secondary to pain. Added hydralazine 5 mg IV q6H as needed.  -His abdominal pain seems to be worsening, meds are helpful but pain is persistent. Feels very bloated and uncomfortable. Has been walking often, taking only sips of liquids now as he has no appetite.    Physical Exam   Vital Signs: Temp: 98.6  F (37  C) Temp src: Oral BP: 130/86 Pulse: 99   Resp: 15 SpO2: 96 % O2 Device: None (Room air)    Weight: 237 lbs 10.49 oz    General: NAD, pleasant, alert and oriented x3  Respiratory: non-labored breathing   Abdomen: distended but soft. Tender to palpation throughout, worst tenderness over RLQ. ASHLEY drain in place with clear serous fluid in bulb  Incisions: intact with steri strips. No erythema or drainage. Some bruising around periumbilical incision.  Extremities: No lower extremity edema, no calf tenderness    Medical Decision Making       35 MINUTES SPENT BY ME on the date of service doing chart review, history, exam, documentation & further activities per the note.  MANAGEMENT DISCUSSED with the following over the past 24 hours: patient and Dr. Lacy        Data     I have personally reviewed the following data over the past 24 hrs:    10.1  \   14.0   / 447     138  102 11.4 /  96; 96   3.7 22 1.22 (H) \       Imaging results reviewed over the past 24 hrs:   No results found for this or any previous visit (from the past 24 hour(s)).

## 2023-03-30 NOTE — PROGRESS NOTES
CLINICAL NUTRITION SERVICES  -  ASSESSMENT NOTE      RECOMMENDATIONS FOR MD/PROVIDER TO ORDER:   Recommend consideration of nutrition support if unable to advance diet past CLD within 2-3 days    Recommendations Ordered by Registered Dietitian (RD):   - RD introduced self to pt, role in care. RD encouraged protein intake for post-op healing and preservation of muscle mass. Encouraged pt to keep walking in the halls, as able, as this also helps with preservation of muscle mass. Is hesitant to try any clear ONS at this time. He would like an order still so he can order one to try when he is feeling up to it.   - pt to order ONS ok within diet PRN    Malnutrition:   % Weight Loss:  None noted  % Intake:  </= 50% for >/= 5 days (severe malnutrition) - x9 days   Subcutaneous Fat Loss:  None observed  Muscle Loss:  None observed  Fluid Retention:  None noted    Malnutrition Diagnosis: Patient does not meet two of the established criteria necessary for diagnosing malnutrition but is at risk for malnutrition       REASON FOR ASSESSMENT  Efrain Lock is a 47 year old male seen by Registered Dietitian for LOS and NPO/Clear Liquids      NUTRITION HISTORY  - Information obtained from chart review and discussion with pt   - PMH of T2DM, HTN, HLD, tobacco abuse   - presented for abdominal pain that started suddenly 3/22 while at the gym.  - MD note-- Prior to admission patient reports he did not eat for 2 days.   - admitted with Perforated appendicitis  - social hx: Was a nurse in the [employer].   - pt reported he typically eats well though has been trying to lose wt recently. Has been increasing physical activity and started taking Ozempic. UBW was 255# but d/t intentional wt loss over past 3 months, new UBW is about 230#.    CURRENT NUTRITION ORDERS  Diet Order: Clear Liquid    3/20: CLD  3/28: FLD, Mod CHO  3/27: FLD --> CLD  3/26: FLD --> CLD --> NPO  3/23: NPO --> CLD    Current Intake/Tolerance:  - visited with pt this  "morning. Pt was walking in the halls. Pt reported he has only been having sips of clears still. Had some water for meds this morning and it was uncomfortable. RD encouraged protein intake for post-op healing and preservation of muscle mass. Encouraged pt to keep walking in the halls, as able! Is hesitant to try any clear ONS at this time. He would like an order still so he can order one to try when he is feeling up to it. He reported his last \"meal\" was Wednesday- he had a banana for lunch and a few bites of rice for dinner.   - per chart review, pt tolerating some sips of clears   - per nursing flow sheet, no intakes documented   - per health touch, pt has received a few full liquid meals this admit     PER CHART REVIEW  I/O: 145 mL drain output yesterday, 5x BM on 3/28    Gen surgery-- pt has persistent ileus     3/27: NG removed   3/26: NG placed for LIS    3/23: laparoscopic appendectomy --> findings: Perforated appendicitis with purulent peritonitis.    NUTRITION FOCUSED PHYSICAL ASSESSMENT FOR DIAGNOSING MALNUTRITION)  Yes          Observed:    No nutrition-related physical findings observed    ANTHROPOMETRICS  Height: 5' 5\"  Weight: 237 lbs 10.49 oz  Body mass index is 39.55 kg/m .  Weight Status:  Obesity Grade II BMI 35-39.9  IBW: 61.8 kg  % IBW: 174%  Weight History: no significant recent wt loss  Wt Readings from Last 5 Encounters:   03/23/23 107.8 kg (237 lb 10.5 oz)   01/04/23 105.1 kg (231 lb 12.8 oz)   10/28/22 110.6 kg (243 lb 12.8 oz)   11/19/21 111.6 kg (246 lb)   08/23/21 113.4 kg (250 lb)       LABS  Labs reviewed:   Lab Results   Component Value Date     03/30/2023    POTASSIUM 3.7 03/30/2023    BUN 11.4 03/30/2023    CR 1.22 (H) 03/30/2023    ALKPHOS 148 (H) 03/23/2023    ALT 39 03/23/2023    AST 25 03/23/2023     Lab Results   Component Value Date    GLC 96 03/30/2023    GLC 96 03/30/2023    GLC 93 03/29/2023    GLC 94 03/29/2023     (H) 03/28/2023       MEDICATIONS  Medications " reviewed: pepcid, IVF @ 125 mL/hr       ASSESSED NUTRITION NEEDS PER APPROVED PRACTICE GUIDELINES:  Dosing Weight: 73 kg (adjusted, based on 107.8 kg-- 3/23)  Estimated Energy Needs: 8711-0543 kcals (20-25 Kcal/Kg)  Justification: maintenance r/t BMI  Estimated Protein Needs:  grams protein (1.2-1.5 g pro/Kg)  Justification: post-op and preservation of lean body mass  Estimated Fluid Needs: 1 mL/Kcal  Justification: maintenance OR per provider pending fluid status    MALNUTRITION:  % Weight Loss:  None noted  % Intake:  </= 50% for >/= 5 days (severe malnutrition) - x9 days   Subcutaneous Fat Loss:  None observed  Muscle Loss:  None observed  Fluid Retention:  None noted    Malnutrition Diagnosis: Patient does not meet two of the established criteria necessary for diagnosing malnutrition but is at risk for malnutrition    NUTRITION DIAGNOSIS:  Inadequate oral intake related to slow diet advancement 2/2 ileus as evidenced by current CLD, minimal intake x9 days       NUTRITION INTERVENTIONS  Recommendations / Nutrition Prescription  - RD introduced self to pt, role in care. RD encouraged protein intake for post-op healing and preservation of muscle mass. Encouraged pt to keep walking in the halls, as able, as this also helps with preservation of muscle mass. Is hesitant to try any clear ONS at this time. He would like an order still so he can order one to try when he is feeling up to it.   - pt to order ONS ok within diet PRN     Implementation  Nutrition education   Medical Food Supplement    Nutrition Goals  Diet adv v nutrition support within 2-3 days.      MONITORING AND EVALUATION:  Progress towards goals will be monitored and evaluated per protocol and Practice Guidelines      Daisy Suárez, LYDIA, LD

## 2023-03-31 ENCOUNTER — APPOINTMENT (OUTPATIENT)
Dept: CT IMAGING | Facility: CLINIC | Age: 48
DRG: 339 | End: 2023-03-31
Attending: PHYSICIAN ASSISTANT
Payer: COMMERCIAL

## 2023-03-31 LAB
ANION GAP SERPL CALCULATED.3IONS-SCNC: 12 MMOL/L (ref 7–15)
BUN SERPL-MCNC: 10.9 MG/DL (ref 6–20)
CALCIUM SERPL-MCNC: 8.2 MG/DL (ref 8.6–10)
CHLORIDE SERPL-SCNC: 105 MMOL/L (ref 98–107)
CREAT SERPL-MCNC: 1.19 MG/DL (ref 0.67–1.17)
DEPRECATED HCO3 PLAS-SCNC: 20 MMOL/L (ref 22–29)
ERYTHROCYTE [DISTWIDTH] IN BLOOD BY AUTOMATED COUNT: 12.8 % (ref 10–15)
GFR SERPL CREATININE-BSD FRML MDRD: 75 ML/MIN/1.73M2
GLUCOSE BLDC GLUCOMTR-MCNC: 76 MG/DL (ref 70–99)
GLUCOSE BLDC GLUCOMTR-MCNC: 78 MG/DL (ref 70–99)
GLUCOSE BLDC GLUCOMTR-MCNC: 89 MG/DL (ref 70–99)
GLUCOSE SERPL-MCNC: 91 MG/DL (ref 70–99)
HCT VFR BLD AUTO: 39.6 % (ref 40–53)
HGB BLD-MCNC: 13.2 G/DL (ref 13.3–17.7)
MCH RBC QN AUTO: 29.1 PG (ref 26.5–33)
MCHC RBC AUTO-ENTMCNC: 33.3 G/DL (ref 31.5–36.5)
MCV RBC AUTO: 87 FL (ref 78–100)
PLATELET # BLD AUTO: 457 10E3/UL (ref 150–450)
POTASSIUM SERPL-SCNC: 3.6 MMOL/L (ref 3.4–5.3)
RBC # BLD AUTO: 4.54 10E6/UL (ref 4.4–5.9)
SODIUM SERPL-SCNC: 137 MMOL/L (ref 136–145)
WBC # BLD AUTO: 9.1 10E3/UL (ref 4–11)

## 2023-03-31 PROCEDURE — 36415 COLL VENOUS BLD VENIPUNCTURE: CPT | Performed by: PHYSICIAN ASSISTANT

## 2023-03-31 PROCEDURE — 250N000011 HC RX IP 250 OP 636: Performed by: SURGERY

## 2023-03-31 PROCEDURE — 85027 COMPLETE CBC AUTOMATED: CPT | Performed by: PHYSICIAN ASSISTANT

## 2023-03-31 PROCEDURE — 250N000011 HC RX IP 250 OP 636: Performed by: PHYSICIAN ASSISTANT

## 2023-03-31 PROCEDURE — 80048 BASIC METABOLIC PNL TOTAL CA: CPT | Performed by: PHYSICIAN ASSISTANT

## 2023-03-31 PROCEDURE — 74177 CT ABD & PELVIS W/CONTRAST: CPT

## 2023-03-31 PROCEDURE — 99231 SBSQ HOSP IP/OBS SF/LOW 25: CPT | Performed by: NURSE PRACTITIONER

## 2023-03-31 PROCEDURE — 250N000013 HC RX MED GY IP 250 OP 250 PS 637: Performed by: PHYSICIAN ASSISTANT

## 2023-03-31 PROCEDURE — 250N000009 HC RX 250: Performed by: SURGERY

## 2023-03-31 PROCEDURE — 250N000013 HC RX MED GY IP 250 OP 250 PS 637

## 2023-03-31 PROCEDURE — 258N000003 HC RX IP 258 OP 636: Performed by: PHYSICIAN ASSISTANT

## 2023-03-31 PROCEDURE — 120N000001 HC R&B MED SURG/OB

## 2023-03-31 RX ORDER — BISACODYL 10 MG
10 SUPPOSITORY, RECTAL RECTAL DAILY PRN
Status: DISCONTINUED | OUTPATIENT
Start: 2023-03-31 | End: 2023-04-01 | Stop reason: HOSPADM

## 2023-03-31 RX ORDER — IOPAMIDOL 755 MG/ML
120 INJECTION, SOLUTION INTRAVASCULAR ONCE
Status: COMPLETED | OUTPATIENT
Start: 2023-03-31 | End: 2023-03-31

## 2023-03-31 RX ADMIN — CYCLOBENZAPRINE 5 MG: 5 TABLET, FILM COATED ORAL at 09:11

## 2023-03-31 RX ADMIN — PIPERACILLIN AND TAZOBACTAM 3.38 G: 3; .375 INJECTION, POWDER, FOR SOLUTION INTRAVENOUS at 23:17

## 2023-03-31 RX ADMIN — LOTEPREDNOL ETABONATE 2 DROP: 5 SUSPENSION/ DROPS OPHTHALMIC at 19:32

## 2023-03-31 RX ADMIN — SODIUM CHLORIDE: 9 INJECTION, SOLUTION INTRAVENOUS at 21:03

## 2023-03-31 RX ADMIN — LOTEPREDNOL ETABONATE 2 DROP: 5 SUSPENSION/ DROPS OPHTHALMIC at 10:24

## 2023-03-31 RX ADMIN — PIPERACILLIN AND TAZOBACTAM 3.38 G: 3; .375 INJECTION, POWDER, FOR SOLUTION INTRAVENOUS at 04:56

## 2023-03-31 RX ADMIN — PIPERACILLIN AND TAZOBACTAM 3.38 G: 3; .375 INJECTION, POWDER, FOR SOLUTION INTRAVENOUS at 10:58

## 2023-03-31 RX ADMIN — IOPAMIDOL 120 ML: 755 INJECTION, SOLUTION INTRAVENOUS at 12:45

## 2023-03-31 RX ADMIN — SIMETHICONE 80 MG: 80 TABLET, CHEWABLE ORAL at 09:11

## 2023-03-31 RX ADMIN — LOTEPREDNOL ETABONATE 2 DROP: 5 SUSPENSION/ DROPS OPHTHALMIC at 16:33

## 2023-03-31 RX ADMIN — PIPERACILLIN AND TAZOBACTAM 3.38 G: 3; .375 INJECTION, POWDER, FOR SOLUTION INTRAVENOUS at 16:33

## 2023-03-31 RX ADMIN — FAMOTIDINE 20 MG: 20 TABLET ORAL at 08:27

## 2023-03-31 RX ADMIN — SODIUM CHLORIDE: 9 INJECTION, SOLUTION INTRAVENOUS at 10:57

## 2023-03-31 RX ADMIN — AMLODIPINE BESYLATE 10 MG: 10 TABLET ORAL at 08:27

## 2023-03-31 RX ADMIN — FAMOTIDINE 20 MG: 20 TABLET ORAL at 19:32

## 2023-03-31 RX ADMIN — ATORVASTATIN CALCIUM 20 MG: 20 TABLET, FILM COATED ORAL at 08:27

## 2023-03-31 RX ADMIN — SODIUM CHLORIDE: 9 INJECTION, SOLUTION INTRAVENOUS at 02:21

## 2023-03-31 RX ADMIN — SODIUM CHLORIDE 74 ML: 900 INJECTION INTRAVENOUS at 12:45

## 2023-03-31 ASSESSMENT — ACTIVITIES OF DAILY LIVING (ADL)
ADLS_ACUITY_SCORE: 18

## 2023-03-31 NOTE — PROGRESS NOTES
"General Surgery Progress Note    Admission Date: 3/23/2023  Today's Date: 3/31/2023         Assessment:      Efrain Lock is a 47 year old male with perforated appendicitis and peritonitis POD 8 s/p laparoscopic appendectomy with postop ileus as expected, prolonged hospitalization.         Plan:   - Obtain CT abdomen/pelvis today given slow recovery and continued pain. Renal function has significantly improved, will monitor closely after receiving contrast and continue IV fluids  - Pain a little better controlled, continue with mainly IV meds until more diet advancement  - Repeat suppository today   - Frequent out of bed, ambulate in halls at least QID, PCDs while resting. Patient declining subQ heparin   - Continue Zosyn. Abdominal fluid cultures with e coli and 2 anaerobes. Urine culture with no growth. May transition to Augmentin at discharge pending length of hospital stay  - Continue ASHLEY drain, strip every shift. Anticipate removal prior to discharge  - Medical management per hospitalist, appreciate assistance     Dispo: unclear, hopefully in the next 2-3 days pending improvement, CT findings, tolerance of diet advancement        Interval History:   Tmax 99, pulse 90s - low 100s. Hypertensive at times, no O2 requirement. White count remains normal, creatinine continues to improve. Efrain feels about the same today. He was able to have some BMs yesterday after suppository. He states that the bloating will temporarily improve after having a BM or passing gas, then it returns and is persistently bothersome. He has been tolerating small amounts of clear liquids, cannot take in more without feeling too bloated and full. Abdominal pain is not severe, seems a bit better today. Drain output up slightly, 135cc x 24 hours.          Physical Exam:   BP (!) 163/93 (BP Location: Right arm)   Pulse 98   Temp 99  F (37.2  C) (Oral)   Resp 16   Ht 1.651 m (5' 5\")   Wt 107.8 kg (237 lb 10.5 oz)   SpO2 95%   BMI 39.55 " kg/m    I/O last 3 completed shifts:  In: 2587 [I.V.:2587]  Out: 135 [Drains:135]  General: NAD, pleasant, alert and oriented x3  Respiratory: non-labored breathing   Abdomen: distended but soft. Tender to palpation throughout, worst tenderness over RLQ but slightly less tender today. ASHLEY drain in place with clear serous fluid in bulb  Incisions: intact with steri strips. No erythema or drainage. Some bruising around periumbilical incision.  Extremities: No lower extremity edema, no calf tenderness    LABS:  Recent Labs   Lab Test 03/31/23  0528 03/30/23  0729 03/29/23  0553   WBC 9.1 10.1 11.4*   HGB 13.2* 14.0 13.5   MCV 87 88 87   * 447 367      Recent Labs   Lab Test 03/31/23  0528 03/30/23  0729 03/29/23  0553   POTASSIUM 3.6 3.7 3.6   CHLORIDE 105 102 104   CO2 20* 22 21*   BUN 10.9 11.4 12.2   CR 1.19* 1.22* 1.30*   ANIONGAP 12 14 14     -------------------------------    Jennifer Woody PA-C  [facility]  [phone]

## 2023-03-31 NOTE — PLAN OF CARE
Goal Outcome Evaluation:                    Summary POD 8   POD 8 s/p laparoscopic appendectomy with postop ileus as expected, prolonged hospitalization.      3/31/2023 [id number]    Orientation A& O x4    Vitals/Tele VSS on RM air     IV Access/drains PIV infusing NS 125ml/hr &  intermittent ABX, ASHLEY      Diet Clear     Mobility Ind     GI/ Continent no BM this shift still has some full feeling      Wound/Skin WNL no adjustments needed     Discharge Plan Pending     Declined all PRN medication, Denies pain   Slept most of shift, family at bedside to visiting hours    See Flow sheets for assessment

## 2023-03-31 NOTE — PROGRESS NOTES
SPIRITUAL HEALTH SERVICES Progress Note  [facility] General Surgery    Saw pt Efrain Lock per length of stay protocol. I provided reflective listening and said a prayer.      Patient/Family Understanding of Illness and Goals of Care - Pt was tired when speaking about his illness narrative, mentioning having a CT scan.     Distress and Loss - Not Dicussed.     Strengths, Coping, and Resources - Pt's wife and son were present during my visit.       Meaning, Beliefs, and Spirituality - Efrain is [employer].  He attends [employer].      Plan of Care - Spiritual Health Services remain available.     KOLE NazarioDiv  Chaplain Resident   [phone]

## 2023-03-31 NOTE — PROGRESS NOTES
When into see pt  for bedside report with oncoming R.N.  patient reports that he has had loose bm and passing gas now and declines the prn dulcolax suppository .

## 2023-03-31 NOTE — PROGRESS NOTES
General Surgery - Chart Update    CT reviewed. No significant fluid collection/abscess seen. He does have diffuse small bowel dilatation. Recommend continuing only on clear liquids, await improvement in distention and bowel function. Can use suppositories daily as needed, continue with frequent walking, maintenance IV fluids at least through tomorrow. This was discussed with bedside nurse via phone this afternoon.  If patient does not have improvement in distention or develops nausea/vomiting, consider replacing NG tube. Surgical team will see patient again in the morning.        Jennifer Woody PA-C  [facility]  [phone]

## 2023-03-31 NOTE — PLAN OF CARE
Goal Outcome Evaluation:  Pt has been up independently in room . Tolerating clear liquids denies nausea and states he is feeling hungry today.  Ivf patent had CT abdomen this afternoon . Reports passing flatus but no bm today,voiding. Abdomen soft distended reports feeling gas discomfort. Taking prn flexeril and simethicone.

## 2023-03-31 NOTE — PROGRESS NOTES
[facility]    Medicine Progress Note - Hospitalist Service    Date of Admission:  3/23/2023    Assessment & Plan   Efrain Lock is a 47 year old male with a PMH significant for DM type II, and HTN, admitted on 3/23/2023 with perforated appendicitis and peritonitis status post laparoscopic appendectomy.     Perforated appendicitis and peritonitis status post laparoscopic appendectomy 3/23/23 with Dr. Zabala  EBL of 5ml.   *peritoneal fluid growing E.Coli  -ASHLEY remains in place. strip every shift. Anticipate removal prior to discharge  -Continue Zosyn. Abdominal fluid cultures with e coli and 2 anaerobes. Likely transition to Augmentin at discharge.   -Frequent out of bed, ambulate in halls at least QID, PCDs while resting. Patient declining subQ heparin, so this was discontinued  -Obtain CT abdomen/pelvis today given slow recovery and continued pain. Renal function has significantly improved, will monitor closely after receiving contrast and continue IV fluids.  - Repeat suppository today   -clear liquid diet      Abnormal UA   UA on admission remarkable for moderate leukocyte esterase. Culture pending.   *UC showing no growth      MAGEN (improved)   Creatinine 1.56 on admission, Cr 1.19 today   - strict I/Os   - Avoid nephrotoxins including NSAIDs  - follow BMP in am  --will place hold on losartan until kidney function at baseline      Hypokalemia (resolved)   -replete as needed  -trend level      DM2  HgbA1C 6.5.-   -PTA metformin and Ozempic Hold while hospitalized  - BG checks BID     Hypertension  Hyperlipidemia  -continue PTA amlodipine with hold parameters  --hold PTA losartan given MAGEN   -added hydralazine as needed            Diet: Clear Liquid Diet  Snacks/Supplements Adult: Other; supplements ok within diet PRN; Between Meals    DVT Prophylaxis: Defer to primary service  Gomez Catheter: Not present  Lines: None     Cardiac Monitoring: None  Code Status: Full Code      Clinically  "Significant Risk Factors                       # DMII: A1C = 6.5 % (Ref range: 0.0 - 5.6 %) within past 6 months   # Obesity: Estimated body mass index is 39.55 kg/m  as calculated from the following:    Height as of this encounter: 1.651 m (5' 5\").    Weight as of this encounter: 107.8 kg (237 lb 10.5 oz).          Disposition Plan      Expected Discharge Date: 04/01/2023,  3:00 PM    Destination: home with family  Discharge Comments: Plan to stay another night.  Surgery primary,        The patient's care was discussed with the Attending Physician, Dr. Lacy .    Vanessa Mart CNP  Hospitalist Service  [facility]  Securely message with [employer] info)  Text page via [employer] Paging/Directory   ______________________________________________________________________    Interval History   -NAD noted  -Patient creatinine improved.  -will sign off, feel free to re-consult if hospitalist services needed     Physical Exam   Vital Signs: Temp: 98.6  F (37  C) Temp src: Oral BP: (!) 145/86 Pulse: 99   Resp: 18 SpO2: 99 % O2 Device: None (Room air)    Weight: 237 lbs 10.49 oz    General: NAD, pleasant, alert and oriented x3  Cardiovascular: RRR   Respiratory: non-labored breathing   Abdomen: distended but soft. Tender to palpation throughout, worst tenderness over RLQ but slightly less tender today. ASHLEY drain in place with clear serous fluid in bulb  Incisions: intact with steri strips. No erythema or drainage. Some bruising around periumbilical incision.  Extremities: No lower extremity edema, no calf tenderness    Medical Decision Making       25 MINUTES SPENT BY ME on the date of service doing chart review, history, exam, documentation & further activities per the note.  MANAGEMENT DISCUSSED with the following over the past 24 hours: patient and Dr. Lacy        Data     I have personally reviewed the following data over the past 24 hrs:    9.1  \   13.2 (L)   / 457 (H)     137 105 10.9 /  89 "   3.6 20 (L) 1.19 (H) \       Imaging results reviewed over the past 24 hrs:   No results found for this or any previous visit (from the past 24 hour(s)).

## 2023-03-31 NOTE — PLAN OF CARE
Goal Outcome Evaluation: POD #8. A&Ox4. BP elevated, other VSS on RA. C/o L abd pain, given Valium & Simethicone. Denies nausea. Abd distended (improving), loose stool x 3. Lower abd midline ASHLEY w/ serosanguinous OP, dressing CDI. PIV infusing IVF, intermittent Zosyn. Clears, tolerating. Independent. Plan: possible re-imaging.     Plan of Care Reviewed With: patient    Overall Patient Progress: improving

## 2023-04-01 VITALS
HEART RATE: 91 BPM | DIASTOLIC BLOOD PRESSURE: 87 MMHG | OXYGEN SATURATION: 99 % | BODY MASS INDEX: 39.3 KG/M2 | RESPIRATION RATE: 18 BRPM | SYSTOLIC BLOOD PRESSURE: 140 MMHG | WEIGHT: 235.89 LBS | TEMPERATURE: 98.1 F | HEIGHT: 65 IN

## 2023-04-01 LAB
ERYTHROCYTE [DISTWIDTH] IN BLOOD BY AUTOMATED COUNT: 12.8 % (ref 10–15)
GLUCOSE BLDC GLUCOMTR-MCNC: 84 MG/DL (ref 70–99)
HCT VFR BLD AUTO: 41.6 % (ref 40–53)
HGB BLD-MCNC: 13.7 G/DL (ref 13.3–17.7)
MCH RBC QN AUTO: 28.4 PG (ref 26.5–33)
MCHC RBC AUTO-ENTMCNC: 32.9 G/DL (ref 31.5–36.5)
MCV RBC AUTO: 86 FL (ref 78–100)
PLATELET # BLD AUTO: 499 10E3/UL (ref 150–450)
POTASSIUM SERPL-SCNC: 3.6 MMOL/L (ref 3.4–5.3)
RBC # BLD AUTO: 4.83 10E6/UL (ref 4.4–5.9)
WBC # BLD AUTO: 8.9 10E3/UL (ref 4–11)

## 2023-04-01 PROCEDURE — 99232 SBSQ HOSP IP/OBS MODERATE 35: CPT | Performed by: HOSPITALIST

## 2023-04-01 PROCEDURE — 250N000011 HC RX IP 250 OP 636: Performed by: PHYSICIAN ASSISTANT

## 2023-04-01 PROCEDURE — 250N000013 HC RX MED GY IP 250 OP 250 PS 637

## 2023-04-01 PROCEDURE — 250N000013 HC RX MED GY IP 250 OP 250 PS 637: Performed by: PHYSICIAN ASSISTANT

## 2023-04-01 PROCEDURE — 84132 ASSAY OF SERUM POTASSIUM: CPT | Performed by: HOSPITALIST

## 2023-04-01 PROCEDURE — 85027 COMPLETE CBC AUTOMATED: CPT | Performed by: HOSPITALIST

## 2023-04-01 PROCEDURE — 258N000003 HC RX IP 258 OP 636: Performed by: PHYSICIAN ASSISTANT

## 2023-04-01 PROCEDURE — 36415 COLL VENOUS BLD VENIPUNCTURE: CPT | Performed by: HOSPITALIST

## 2023-04-01 RX ORDER — SIMETHICONE 80 MG
80 TABLET,CHEWABLE ORAL 4 TIMES DAILY PRN
Qty: 30 TABLET | Refills: 1 | Status: SHIPPED | OUTPATIENT
Start: 2023-04-01 | End: 2023-12-26

## 2023-04-01 RX ORDER — CYCLOBENZAPRINE HCL 5 MG
5-10 TABLET ORAL EVERY 8 HOURS PRN
Qty: 20 TABLET | Refills: 0 | Status: SHIPPED | OUTPATIENT
Start: 2023-04-01 | End: 2023-12-26

## 2023-04-01 RX ADMIN — AMLODIPINE BESYLATE 10 MG: 10 TABLET ORAL at 08:29

## 2023-04-01 RX ADMIN — LOTEPREDNOL ETABONATE 1 DROP: 5 SUSPENSION/ DROPS OPHTHALMIC at 08:30

## 2023-04-01 RX ADMIN — FAMOTIDINE 20 MG: 20 TABLET ORAL at 08:30

## 2023-04-01 RX ADMIN — ATORVASTATIN CALCIUM 20 MG: 20 TABLET, FILM COATED ORAL at 08:29

## 2023-04-01 RX ADMIN — SODIUM CHLORIDE: 9 INJECTION, SOLUTION INTRAVENOUS at 04:28

## 2023-04-01 RX ADMIN — PIPERACILLIN AND TAZOBACTAM 3.38 G: 3; .375 INJECTION, POWDER, FOR SOLUTION INTRAVENOUS at 04:27

## 2023-04-01 ASSESSMENT — ACTIVITIES OF DAILY LIVING (ADL)
ADLS_ACUITY_SCORE: 18

## 2023-04-01 NOTE — PROGRESS NOTES
[facility]    Medicine Progress Note - Hospitalist Service    Date of Admission:  3/23/2023    Assessment & Plan   Efrain Lock is a 47 year old male with a PMH significant for DM type II, and HTN, admitted on 3/23/2023 with perforated appendicitis and peritonitis status post laparoscopic appendectomy.     Perforated appendicitis and peritonitis status post laparoscopic appendectomy 3/23/23 with Dr. Zabala  EBL of 5ml.   *peritoneal fluid growing E.Coli  -ASHLEY remains in place. strip every shift. Anticipate removal prior to discharge  -Continue Zosyn. Abdominal fluid cultures with e coli and 2 anaerobes. Likely transition to Augmentin at discharge.   -Frequent out of bed, ambulate in halls at least QID, PCDs while resting. Patient declining subQ heparin, so this was discontinued  -CT abdomen 3/31/2023 consistent with SBO, no drainable abscess.  General surgery changed to clear liquids, tolerating.  Remains afebrile, no leukocytosis.  Patient up and about.     Abnormal UA   UA on admission remarkable for moderate leukocyte esterase. Culture pending.   *UC showing no growth      MAGEN (improved)   Creatinine 1.56 on admission, unclear baseline  - strict I/Os   - Avoid nephrotoxins including NSAIDs  --will place hold on losartan until kidney function at baseline,-140  -BMP in a.m.       Hypokalemia (resolved)   -replete as needed  -trend level      DM2  HgbA1C 6.5.-   -PTA metformin and Ozempic Hold while hospitalized  - BG checks BID 70-80 on clear liquids.     Hypertension  Hyperlipidemia  -continue PTA amlodipine with hold parameters  --hold PTA losartan given MAGEN   -added hydralazine as needed   -See above update BMP in AM.    Diet: Clear Liquid Diet  Snacks/Supplements Adult: Other; supplements ok within diet PRN; Between Meals  Diet    DVT Prophylaxis: Defer to primary service  Gomez Catheter: Not present  Lines: None     Cardiac Monitoring: None  Code Status: Full Code   "    Clinically Significant Risk Factors                       # DMII: A1C = 6.5 % (Ref range: 0.0 - 5.6 %) within past 6 months   # Obesity: Estimated body mass index is 39.25 kg/m  as calculated from the following:    Height as of this encounter: 1.651 m (5' 5\").    Weight as of this encounter: 107 kg (235 lb 14.3 oz).          Expected discharge.  Per primary service, general surgery    Wellington Crystal MD  Hospitalist Service  [facility]  Securely message with [employer] (more info)  Text page via [employer] Paging/Directory      I spent 35 minutes total time in management of care today 4/1/2023 assuming Patient care, reviewing labs, medications, interdisciplinary notes; and completing documentation of encounter and orders with Care Management including counseling/discussion with the Patient regarding SBO, HTN and Coordinating Care and plan with Nursing and Specialists, GenSurgery regarding surgery/SBO management and surveillance   ______________________________________________________________________    Interval History   Tolerating clear liquids, states up and about.    Physical Exam   Vital Signs: Temp: 98.1  F (36.7  C) Temp src: Oral BP: (!) 140/87 Pulse: 91   Resp: 18 SpO2: 99 % O2 Device: None (Room air)    Weight: 235 lbs 14.28 oz    General:  NAD, alert, calm, cooperative    Cardiovascular: Regular  Respiratory: Aspirations nonlabored room air  Abdomen: Soft,distended, no reproducible tenderness.  No rebound, guarding or other peritoneal signs.  Removed intact.  ASHLEY drain in place.   Neuro.  Gross motor tested, nonfocal,   Psych oriented, affect calm            Data     I have personally reviewed the following data over the past 24 hrs:    8.9  \   13.7   / 499 (H)     N/A N/A N/A /  84   3.6 N/A N/A \       Imaging results reviewed over the past 24 hrs:     "

## 2023-04-01 NOTE — PROGRESS NOTES
Pt alert and oriented, up independently, tolerates clear liquids, passing flatus, did report BM this shift, pt will discharge to home, pt and wife were given written and verbal discharge instructions, pt knows that follow up care is needed and who to call with any questions or concern, scripts sent with pt to fill at his pharmacy, medications were reviewed, all question answered and ASHLEY care pamphlet and instructions were reviewed with pt. Pt is ready for discharge.

## 2023-04-01 NOTE — PLAN OF CARE
POD 9 lap appy and post op ileus. A&Ox4, VSS on RA. PIV infusing  ml/hr w/ int abx. Tolerating clear liquid diet. Up independently, voiding in the BR. Abd incisions, CDI. ASHLEY w/ serosanguinous output CDI. Denies N/V. Discharge pending improvement on abd distention, ROBF and diet toleration.

## 2023-04-02 NOTE — DISCHARGE SUMMARY
[facility] Discharge Summary    Efrain Lock MRN# [id number]   Age: 47 year old YOB: 1975     Date of Admission:  3/23/2023  Date of Discharge::  4/1/2023  1:12 PM  Admitting Physician:  Nabeel Zabala MD  Discharge Physician:  Nabeel Zabala MD, MD             Admission Diagnoses:   Perforated appendicitis          Discharge Diagnosis:   Same, post-op ileus          Procedures:   Procedure(s): Appendectomy       No other procedures performed during this admission           Medications Prior to Admission:     No medications prior to admission.             Discharge Medications:     Discharge Medication List as of 4/1/2023 12:47 PM      START taking these medications    Details   amoxicillin-clavulanate (AUGMENTIN) 875-125 MG tablet Take 1 tablet by mouth 2 times daily for 10 days, Disp-14 tablet, R-0, Local Print      cyclobenzaprine (FLEXERIL) 5 MG tablet Take 1-2 tablets (5-10 mg) by mouth every 8 hours as needed for muscle spasms, Disp-20 tablet, R-0, Local Print      simethicone (MYLICON) 80 MG chewable tablet Take 1 tablet (80 mg) by mouth 4 times daily as needed for cramping or flatulence, Disp-30 tablet, R-1, Local Print         CONTINUE these medications which have NOT CHANGED    Details   amLODIPine (NORVASC) 10 MG tablet Take 1 tablet (10 mg) by mouth daily, Disp-90 tablet, R-3, E-Prescribe      atorvastatin (LIPITOR) 20 MG tablet Take 1 tablet (20 mg) by mouth daily, Disp-90 tablet, R-1, E-Prescribe      losartan (COZAAR) 100 MG tablet Take 1 tablet (100 mg) by mouth daily, Disp-90 tablet, R-3, E-Prescribe      loteprednol (LOTEMAX) 0.5 % ophthalmic suspension Place 1-2 drops into the right eye 4 times daily, Disp-10 mL, R-3, E-Prescribe      metFORMIN (GLUCOPHAGE) 500 MG tablet Take 1 tablet (500 mg) by mouth 2 times daily (with meals), Disp-180 tablet, R-1, E-Prescribe      Multiple Vitamins-Minerals (MULTIVITAMIN ADULT PO) Take 1 tablet by mouth daily,  Historical      Semaglutide, 1 MG/DOSE, (OZEMPIC, 1 MG/DOSE,) 4 MG/3ML SOPN Inject 1 mg Subcutaneous once a week, Disp-3 mL, R-4, E-Prescribe                   Consultations:   Consultation during this admission received from internal medicine          Brief History of Illness:   Patient presented with perforated appendicitis.  Underwent appendectomy.           Hospital Course:   The patient's hospital course was unremarkable.  He recovered as anticipated and experienced no post-operative complications. Post-op ileus as anticipated.          Discharge Instructions and Follow-Up:   Discharge diet: Regular   Discharge activity: Activity as tolerated   Discharge follow-up: Follow up with me in 1 weeks for drain removal.   Wound care: Ice to area for comfort           Discharge Disposition:   Discharged to home      Attestation:  I have reviewed today's vital signs, notes, medications, labs and imaging.    Nabeel Zabala MD, MD

## 2023-04-03 ENCOUNTER — PATIENT OUTREACH (OUTPATIENT)
Dept: FAMILY MEDICINE | Facility: CLINIC | Age: 48
End: 2023-04-03
Payer: COMMERCIAL

## 2023-04-03 NOTE — TELEPHONE ENCOUNTER
What type of discharge? Inpatient  Risk of Hospital admission or ED visit: 8%  Is a TCM episode required? No  When should the patient follow up with PCP?   Patient will follow up with surgeon. No PCP follow up needed.  Meagan Stanton RN  [facility]

## 2023-04-06 ENCOUNTER — MEDICAL CORRESPONDENCE (OUTPATIENT)
Dept: HEALTH INFORMATION MANAGEMENT | Facility: CLINIC | Age: 48
End: 2023-04-06

## 2023-04-11 ENCOUNTER — OFFICE VISIT (OUTPATIENT)
Dept: SURGERY | Facility: CLINIC | Age: 48
End: 2023-04-11
Payer: COMMERCIAL

## 2023-04-11 DIAGNOSIS — Z09 SURGERY FOLLOW-UP EXAMINATION: Primary | ICD-10-CM

## 2023-04-11 PROCEDURE — 99024 POSTOP FOLLOW-UP VISIT: CPT | Performed by: SURGERY

## 2023-04-11 NOTE — LETTER
April 11, 2023        Henry Cesar MD        RE:   Efrain Lock 1975      Dear Colleague,    Thank you for referring your patient, Efrain Lock, to [facility] Please see a copy of my visit note below.    Efrain Lock presents today for surgical followup.  he is doing well following laparoscopic appendectomy.  Incisions look fine with no signs of wound infection.  Pathology revealed acute appendicitis.  I removed his drain in clinic today.  I expect him to make a complete recovery.     Again, thank you for allowing me to participate in the care of your patient.      Sincerely,      Nabeel Zabala MD

## 2023-04-11 NOTE — PROGRESS NOTES
Surgery Postop Note    Efrain Lock presents today for surgical followup.  he is doing well following laparoscopic appendectomy.  Incisions look fine with no signs of wound infection.  Pathology revealed acute appendicitis.  I removed his drain in clinic today.  I expect him to make a complete recovery.  Thank you for the opportunity to help in his care.    Harvey Zabala M.D.  [facility]  [phone]    Please route or send letter to:  Primary Care Provider (PCP) and Referring Provider

## 2023-04-29 ENCOUNTER — HEALTH MAINTENANCE LETTER (OUTPATIENT)
Age: 48
End: 2023-04-29

## 2023-05-08 ENCOUNTER — OFFICE VISIT (OUTPATIENT)
Dept: OPHTHALMOLOGY | Facility: CLINIC | Age: 48
End: 2023-05-08
Payer: COMMERCIAL

## 2023-05-08 DIAGNOSIS — H10.89 PAPILLARY CONJUNCTIVITIS: ICD-10-CM

## 2023-05-08 DIAGNOSIS — H17.9 CORNEAL SCAR: Primary | ICD-10-CM

## 2023-05-08 DIAGNOSIS — H04.123 DRY EYE SYNDROME OF BOTH EYES: ICD-10-CM

## 2023-05-08 PROCEDURE — 99213 OFFICE O/P EST LOW 20 MIN: CPT | Mod: 24 | Performed by: OPHTHALMOLOGY

## 2023-05-08 RX ORDER — NEOMYCIN/POLYMYXIN B/DEXAMETHA 3.5-10K-.1
1 OINTMENT (GRAM) OPHTHALMIC (EYE) AT BEDTIME
COMMUNITY
End: 2023-09-07

## 2023-05-08 ASSESSMENT — CONF VISUAL FIELD
OS_NORMAL: 1
OS_SUPERIOR_NASAL_RESTRICTION: 0
OD_SUPERIOR_TEMPORAL_RESTRICTION: 3
OD_INFERIOR_TEMPORAL_RESTRICTION: 0
OD_INFERIOR_NASAL_RESTRICTION: 0
OS_SUPERIOR_TEMPORAL_RESTRICTION: 0
OS_INFERIOR_TEMPORAL_RESTRICTION: 0
OS_INFERIOR_NASAL_RESTRICTION: 0
METHOD: COUNTING FINGERS
OD_SUPERIOR_NASAL_RESTRICTION: 0

## 2023-05-08 ASSESSMENT — SLIT LAMP EXAM - LIDS: COMMENTS: NORMAL

## 2023-05-08 ASSESSMENT — TONOMETRY
OD_IOP_MMHG: 7
IOP_METHOD: TONOPEN
OS_IOP_MMHG: 7

## 2023-05-08 ASSESSMENT — VISUAL ACUITY
OS_SC: 20/20
OD_PH_SC: 20/40
OD_SC: 20/150
METHOD: SNELLEN - LINEAR

## 2023-05-08 NOTE — PROGRESS NOTES
HPI    Patient here for Papillary conjunctivitis, dry eye follow up. Using iVizia in both eyes throughout the day and Maxitrol ointment at night. No other drops. Right eye vision is blurred in the mornings, pt thinks it is because he has been rubbing it on his pillow  Last edited by Briseyda Turner COT on 5/8/2023  8:01 AM.         Review of systems for the eyes was negative other than the pertinent positives/negatives listed in the HPI.      Assessment & Plan    HPI:  Efrain Lock is a 47 year old male with history of T2DM, HLD, HTN, floppy eyelids s/p repair presents for followup papillary conjunctivitis. AT 2-3x daily, refresh pm at bedtime with poor compliance.    He has been having issues with CPAP and dryness. Some days are good, some are bad.      POHx:  floppy eyelids s/p repair  PMHx:  T2DM, HLD, HTN  Current Medications: amLODIPine (NORVASC) 10 MG tablet, Take 1 tablet (10 mg) by mouth daily  atorvastatin (LIPITOR) 20 MG tablet, TAKE 1 TABLET(20 MG) BY MOUTH DAILY  cyclobenzaprine (FLEXERIL) 5 MG tablet, Take 1-2 tablets (5-10 mg) by mouth every 8 hours as needed for muscle spasms  losartan (COZAAR) 100 MG tablet, Take 1 tablet (100 mg) by mouth daily  metFORMIN (GLUCOPHAGE) 500 MG tablet, Take 1 tablet (500 mg) by mouth 2 times daily (with meals)  Multiple Vitamins-Minerals (MULTIVITAMIN ADULT PO), Take 1 tablet by mouth daily  neomycin-polymixin-dexamethasone (MAXITROL) ophthalmic ointment, Place 1 g into both eyes At Bedtime  Semaglutide, 1 MG/DOSE, (OZEMPIC, 1 MG/DOSE,) 4 MG/3ML SOPN, Inject 1 mg Subcutaneous once a week  simethicone (MYLICON) 80 MG chewable tablet, Take 1 tablet (80 mg) by mouth 4 times daily as needed for cramping or flatulence  loteprednol (LOTEMAX) 0.5 % ophthalmic suspension, Place 1-2 drops into the right eye 4 times daily (Patient not taking: Reported on 5/8/2023)    No current facility-administered medications on file prior to visit.    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  Discussed ocular and systemic benefits of blood pressure and blood sugar control.  Return in 1 year for full exam with dilation or sooner if changes to vision.      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        Dusty Maldonado MD     Attending Physician Attestation:  Complete documentation of historical and exam elements from today's encounter can be found in the full encounter summary report (not reduplicated in this progress note).  I personally obtained the chief complaint(s) and history of present illness.  I confirmed and edited as necessary the review of systems, past medical/surgical history, family history, social history, and examination findings as documented by others; and I examined the patient myself.  I personally reviewed the relevant tests, images, and reports as documented above.  I formulated and edited as necessary the assessment and plan and discussed the findings and management plan with the patient and family. - Dusty Maldonado MD

## 2023-05-08 NOTE — NURSING NOTE
Chief Complaints and History of Present Illnesses   Patient presents with     Conjunctivitis Follow Up       Chief Complaint(s) and History of Present Illness(es)     Conjunctivitis Follow Up           Comments    Patient here for Papillary conjunctivitis, dry eye follow up. Using iVizia in both eyes throughout the day and Maxitrol ointment at night. No other drops. Right eye vision is blurred in the mornings, pt thinks it is because he has been rubbing it on his pillow                 Briseyda Turner, COT

## 2023-06-02 ENCOUNTER — OFFICE VISIT (OUTPATIENT)
Dept: OPHTHALMOLOGY | Facility: CLINIC | Age: 48
End: 2023-06-02
Payer: COMMERCIAL

## 2023-06-02 DIAGNOSIS — H02.59 FLOPPY EYELID SYNDROME OF RIGHT EYE: ICD-10-CM

## 2023-06-02 DIAGNOSIS — H04.123 DRY EYE SYNDROME OF BOTH EYES: Primary | ICD-10-CM

## 2023-06-02 PROCEDURE — 68761 CLOSE TEAR DUCT OPENING: CPT | Mod: 79 | Performed by: OPHTHALMOLOGY

## 2023-06-02 PROCEDURE — 99213 OFFICE O/P EST LOW 20 MIN: CPT | Mod: 24 | Performed by: OPHTHALMOLOGY

## 2023-06-02 ASSESSMENT — TONOMETRY
OS_IOP_MMHG: 12
OD_IOP_MMHG: 11
IOP_METHOD: ICARE

## 2023-06-02 ASSESSMENT — CONF VISUAL FIELD
METHOD: COUNTING FINGERS
OS_INFERIOR_NASAL_RESTRICTION: 0
OD_SUPERIOR_TEMPORAL_RESTRICTION: 3
OS_NORMAL: 1
OS_SUPERIOR_NASAL_RESTRICTION: 0
OS_SUPERIOR_TEMPORAL_RESTRICTION: 0
OS_INFERIOR_TEMPORAL_RESTRICTION: 0

## 2023-06-02 ASSESSMENT — VISUAL ACUITY
OS_SC: 20/20
METHOD: SNELLEN - LINEAR
OD_SC+: -2
OD_SC: 20/30

## 2023-06-02 ASSESSMENT — SLIT LAMP EXAM - LIDS: COMMENTS: NORMAL

## 2023-06-02 NOTE — NURSING NOTE
Chief Complaints and History of Present Illnesses   Patient presents with     Dry Eye Syndrome Follow Up       Chief Complaint(s) and History of Present Illness(es)     Dry Eye Syndrome Follow Up           Comments    Patient here for a 3 week follow up of dry eye and papillary conjunctivitis. Eyes feel about the same, vision remains blurred. Using PF AT gel every hour in the right eye while awake and Maxitrol ointment right eye only at bedtime.                 Briseyda Turner, COT

## 2023-06-02 NOTE — PROGRESS NOTES
HPI    Patient here for a 3 week follow up of dry eye and papillary conjunctivitis. Eyes feel about the same, vision back to baseline. Using PF AT gel every hour in the right eye while awake and Maxitrol ointment right eye only at bedtime.  Last edited by Dusty Maldonado MD on 6/2/2023 10:54 AM.         Review of systems for the eyes was negative other than the pertinent positives/negatives listed in the HPI.      Assessment & Plan    HPI:  Efrain Lock is a 48 year old male with history of T2DM, HLD, HTN, floppy eyelids s/p repair presents for followup papillary conjunctivitis. Using celluvisc q1-2 hr while awake, maxitrol ointment at bedtime. Feels vision is mostly back to baseline    POHx:  floppy eyelids s/p repair  PMHx:  T2DM, HLD, HTN  Current Medications: amLODIPine (NORVASC) 10 MG tablet, Take 1 tablet (10 mg) by mouth daily  atorvastatin (LIPITOR) 20 MG tablet, TAKE 1 TABLET(20 MG) BY MOUTH DAILY  cyclobenzaprine (FLEXERIL) 5 MG tablet, Take 1-2 tablets (5-10 mg) by mouth every 8 hours as needed for muscle spasms  hydrocortisone 2.5 % ointment, APPLY TOPICALLY TO THE AFFECTED AREA TWICE DAILY  losartan (COZAAR) 100 MG tablet, Take 1 tablet (100 mg) by mouth daily  metFORMIN (GLUCOPHAGE) 500 MG tablet, Take 1 tablet (500 mg) by mouth 2 times daily (with meals)  Multiple Vitamins-Minerals (MULTIVITAMIN ADULT PO), Take 1 tablet by mouth daily  neomycin-polymixin-dexamethasone (MAXITROL) ophthalmic ointment, Place 1 g into both eyes At Bedtime  Semaglutide, 1 MG/DOSE, (OZEMPIC, 1 MG/DOSE,) 4 MG/3ML SOPN, Inject 1 mg Subcutaneous once a week  simethicone (MYLICON) 80 MG chewable tablet, Take 1 tablet (80 mg) by mouth 4 times daily as needed for cramping or flatulence  loteprednol (LOTEMAX) 0.5 % ophthalmic suspension, Place 1-2 drops into the right eye 4 times daily (Patient not taking: Reported on 5/8/2023)    No current facility-administered medications on file prior to visit.    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  Discussed ocular and systemic benefits of blood pressure and blood sugar control.  Return in 1 year for full exam with dilation or sooner if changes to vision.      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        Dusty Maldonado MD     Attending Physician Attestation:  Complete documentation of historical and exam elements from today's encounter can be found in the full encounter summary report (not reduplicated in this progress note).  I personally obtained the chief complaint(s) and history of present illness.  I confirmed and edited as necessary the review of systems, past medical/surgical history, family history, social history, and examination findings as documented by others; and I examined the patient myself.  I personally reviewed the relevant tests, images, and reports as documented above.  I formulated and edited as necessary the assessment and plan and discussed the findings and  management plan with the patient and family. - Dusty Maldonado MD

## 2023-06-03 ENCOUNTER — HEALTH MAINTENANCE LETTER (OUTPATIENT)
Age: 48
End: 2023-06-03

## 2023-06-22 ENCOUNTER — TELEPHONE (OUTPATIENT)
Dept: FAMILY MEDICINE | Facility: CLINIC | Age: 48
End: 2023-06-22

## 2023-06-22 NOTE — TELEPHONE ENCOUNTER
Patient Quality Outreach    Patient is due for the following:   Colon Cancer Screening  Physical Preventive Adult Physical    Next Steps:   Patient was assigned appropriate questionnaire to complete    Type of outreach:    Sent [employer] message.      Questions for provider review:    None           Enrique Guerrero MA

## 2023-07-14 ENCOUNTER — OFFICE VISIT (OUTPATIENT)
Dept: OPHTHALMOLOGY | Facility: CLINIC | Age: 48
End: 2023-07-14
Payer: COMMERCIAL

## 2023-07-14 DIAGNOSIS — H04.123 DRY EYE SYNDROME OF BOTH EYES: Primary | ICD-10-CM

## 2023-07-14 DIAGNOSIS — H10.89 PAPILLARY CONJUNCTIVITIS: ICD-10-CM

## 2023-07-14 PROCEDURE — 99213 OFFICE O/P EST LOW 20 MIN: CPT | Performed by: OPHTHALMOLOGY

## 2023-07-14 RX ORDER — FLUOROMETHOLONE 0.1 %
1 SUSPENSION, DROPS(FINAL DOSAGE FORM)(ML) OPHTHALMIC (EYE) 4 TIMES DAILY
Qty: 10 ML | Refills: 1 | Status: SHIPPED | OUTPATIENT
Start: 2023-07-14 | End: 2023-09-07

## 2023-07-14 ASSESSMENT — VISUAL ACUITY
OS_SC: 20/20
OD_PH_SC: 20/50
METHOD: SNELLEN - LINEAR
OD_SC: 20/70

## 2023-07-14 ASSESSMENT — SLIT LAMP EXAM - LIDS: COMMENTS: NORMAL

## 2023-07-14 ASSESSMENT — TONOMETRY
IOP_METHOD: ICARE
OS_IOP_MMHG: 11
OD_IOP_MMHG: 09

## 2023-07-14 NOTE — PROGRESS NOTES
HPI     Follow Up     Additional comments: Dry eye syndrome of both eyes            Comments    Pt states no change in VA since last visit  Pt states still some dryness and itching   No flashes, floaters or eye pain     Jazmín Correia COT 10:35 AM July 14, 2023               Last edited by Jazmín Correia on 7/14/2023 10:35 AM.         Review of systems for the eyes was negative other than the pertinent positives/negatives listed in the HPI.      Assessment & Plan    HPI:  Efrain Lock is a 48 year old male with history of T2DM, HLD, HTN, floppy eyelids s/p repair presents for followup papillary conjunctivitis. Using celluvisc q1-2 hr while awake, maxitrol ointment at bedtime. Feels vision varies throughout the day. Today is a bad day and hasn't used drops yet    POHx:  floppy eyelids s/p repair  PMHx:  T2DM, HLD, HTN  Current Medications: amLODIPine (NORVASC) 10 MG tablet, Take 1 tablet (10 mg) by mouth daily  atorvastatin (LIPITOR) 20 MG tablet, TAKE 1 TABLET(20 MG) BY MOUTH DAILY  cyclobenzaprine (FLEXERIL) 5 MG tablet, Take 1-2 tablets (5-10 mg) by mouth every 8 hours as needed for muscle spasms  hydrocortisone 2.5 % ointment, APPLY TOPICALLY TO THE AFFECTED AREA TWICE DAILY  losartan (COZAAR) 100 MG tablet, Take 1 tablet (100 mg) by mouth daily  loteprednol (LOTEMAX) 0.5 % ophthalmic suspension, Place 1-2 drops into the right eye 4 times daily (Patient not taking: Reported on 5/8/2023)  metFORMIN (GLUCOPHAGE) 500 MG tablet, Take 1 tablet (500 mg) by mouth 2 times daily (with meals)  Multiple Vitamins-Minerals (MULTIVITAMIN ADULT PO), Take 1 tablet by mouth daily  neomycin-polymixin-dexamethasone (MAXITROL) ophthalmic ointment, Place 1 g into both eyes At Bedtime  Semaglutide, 1 MG/DOSE, (OZEMPIC, 1 MG/DOSE,) 4 MG/3ML SOPN, Inject 1 mg Subcutaneous once a week  simethicone (MYLICON) 80 MG chewable tablet, Take 1 tablet (80 mg) by mouth 4 times daily as needed for cramping or flatulence    No current  facility-administered medications on file prior to visit.    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  Discussed ocular and systemic benefits of blood pressure and blood sugar control.  Return in 1 year for full exam with dilation or sooner if changes to vision.      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        Dusty Maldonado MD     Attending Physician Attestation:  Complete documentation of historical and exam elements from today's encounter can be found in the full encounter summary report (not reduplicated in this progress note).  I personally obtained the chief complaint(s) and history of present illness.  I confirmed and edited as necessary the review of systems, past medical/surgical history, family history, social history, and examination findings as documented by others; and I examined the patient myself.  I  personally reviewed the relevant tests, images, and reports as documented above.  I formulated and edited as necessary the assessment and plan and discussed the findings and management plan with the patient and family. - Dusty Maldonado MD

## 2023-07-14 NOTE — NURSING NOTE
Chief Complaints and History of Present Illnesses   Patient presents with     Follow Up     Dry eye syndrome of both eyes      Chief Complaint(s) and History of Present Illness(es)     Follow Up            Comments: Dry eye syndrome of both eyes           Comments    Pt states no change in VA since last visit  Pt states still some dryness and itching   No flashes, floaters or eye pain     Jazmín Correia COT 10:35 AM July 14, 2023

## 2023-08-02 DIAGNOSIS — E78.5 HYPERLIPIDEMIA LDL GOAL <100: ICD-10-CM

## 2023-08-03 RX ORDER — ATORVASTATIN CALCIUM 20 MG/1
TABLET, FILM COATED ORAL
Qty: 90 TABLET | Refills: 0 | Status: SHIPPED | OUTPATIENT
Start: 2023-08-03 | End: 2023-12-26

## 2023-08-03 NOTE — TELEPHONE ENCOUNTER
LDL Cholesterol Calculated   Date Value Ref Range Status   01/04/2023 44 <=100 mg/dL Final   02/26/2021 115 (H) <100 mg/dL Final     Comment:     Above desirable:  100-129 mg/dl  Borderline High:  130-159 mg/dL  High:             160-189 mg/dL  Very high:       >189 mg/dl       LDL Cholesterol Direct   Date Value Ref Range Status   08/04/2021 126 (H) <100 mg/dL Final     Comment:     Age 0-19 years:  Desirable: 0-110 mg/dL   Borderline high: 110-129 mg/dL   High: >= 130 mg/dL    Age 20 years and older:  Desirable: <100mg/dL  Above desirable: 100-129 mg/dL   Borderline high: 130-159 mg/dL   High: 160-189 mg/dL   Very high: >= 190 mg/dL

## 2023-08-14 ENCOUNTER — OFFICE VISIT (OUTPATIENT)
Dept: OPHTHALMOLOGY | Facility: CLINIC | Age: 48
End: 2023-08-14
Payer: COMMERCIAL

## 2023-08-14 DIAGNOSIS — H17.9 CORNEAL SCAR: ICD-10-CM

## 2023-08-14 DIAGNOSIS — H04.123 DRY EYE SYNDROME OF BOTH EYES: Primary | ICD-10-CM

## 2023-08-14 DIAGNOSIS — H10.89 PAPILLARY CONJUNCTIVITIS: ICD-10-CM

## 2023-08-14 DIAGNOSIS — H02.59 FLOPPY EYELID SYNDROME OF RIGHT EYE: ICD-10-CM

## 2023-08-14 PROCEDURE — 99213 OFFICE O/P EST LOW 20 MIN: CPT | Performed by: OPHTHALMOLOGY

## 2023-08-14 ASSESSMENT — CONF VISUAL FIELD
OS_SUPERIOR_NASAL_RESTRICTION: 0
OD_NORMAL: 1
OS_NORMAL: 1
OD_INFERIOR_NASAL_RESTRICTION: 0
OS_INFERIOR_NASAL_RESTRICTION: 0
OD_SUPERIOR_NASAL_RESTRICTION: 0
OD_SUPERIOR_TEMPORAL_RESTRICTION: 0
OD_INFERIOR_TEMPORAL_RESTRICTION: 0
OS_SUPERIOR_TEMPORAL_RESTRICTION: 0
OS_INFERIOR_TEMPORAL_RESTRICTION: 0

## 2023-08-14 ASSESSMENT — VISUAL ACUITY
OS_SC+: -2
METHOD: SNELLEN - LINEAR
OS_SC: 20/25
OD_SC+: -2
OD_SC: 20/40

## 2023-08-14 ASSESSMENT — TONOMETRY
OS_IOP_MMHG: 16
OD_IOP_MMHG: 19
IOP_METHOD: TONOPEN

## 2023-08-14 ASSESSMENT — SLIT LAMP EXAM - LIDS: COMMENTS: NORMAL

## 2023-08-14 NOTE — PROGRESS NOTES
"HPI    Patient here for a 4 week follow up of dry eye and papillary conjunctivitis. Eyes feel about the same, vision feels a \"little bit blurred\", R>L. Using FML 4x/day, Maxitrol ointment at night, AT's PRN. States vision is fluctuating a lot during the day, especially when it is bright outside.     Has only used 1 drop FML this AM.   Last edited by Grisel Walter on 8/14/2023  9:02 AM.         Review of systems for the eyes was negative other than the pertinent positives/negatives listed in the HPI.      Assessment & Plan    HPI:  Efrain Lock is a 48 year old male with history of T2DM, HLD, HTN, floppy eyelids s/p repair presents for followup papillary conjunctivitis and dry eye. He continues to note good days and bad. Using celluvisc q1-2 hr while awake, FML four times a day  maxitrol ointment at bedtime.     POHx:  floppy eyelids s/p repair  PMHx:  T2DM, HLD, HTN  Current Medications: amLODIPine (NORVASC) 10 MG tablet, Take 1 tablet (10 mg) by mouth daily  atorvastatin (LIPITOR) 20 MG tablet, TAKE 1 TABLET(20 MG) BY MOUTH DAILY  cyclobenzaprine (FLEXERIL) 5 MG tablet, Take 1-2 tablets (5-10 mg) by mouth every 8 hours as needed for muscle spasms  fluorometholone (FML LIQUIFILM) 0.1 % ophthalmic suspension, Place 1 drop into the right eye 4 times daily  hydrocortisone 2.5 % ointment, APPLY TOPICALLY TO THE AFFECTED AREA TWICE DAILY  losartan (COZAAR) 100 MG tablet, Take 1 tablet (100 mg) by mouth daily  loteprednol (LOTEMAX) 0.5 % ophthalmic suspension, Place 1-2 drops into the right eye 4 times daily (Patient not taking: Reported on 5/8/2023)  metFORMIN (GLUCOPHAGE) 500 MG tablet, Take 1 tablet (500 mg) by mouth 2 times daily (with meals)  Multiple Vitamins-Minerals (MULTIVITAMIN ADULT PO), Take 1 tablet by mouth daily  neomycin-polymixin-dexamethasone (MAXITROL) ophthalmic ointment, Place 1 g into both eyes At Bedtime  Semaglutide, 1 MG/DOSE, (OZEMPIC, 1 MG/DOSE,) 4 MG/3ML SOPN, Inject 1 mg Subcutaneous " once a week  simethicone (MYLICON) 80 MG chewable tablet, Take 1 tablet (80 mg) by mouth 4 times daily as needed for cramping or flatulence    No current facility-administered medications on file prior to visit.    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  Discussed ocular and systemic benefits of blood pressure and blood sugar control.  Return in 1 year for full exam with dilation or sooner if changes to vision.      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        Dusty Maldonado MD     Attending Physician Attestation:  Complete documentation of historical and exam elements from today's encounter can be found in the full encounter summary report (not reduplicated in this progress note).  I personally obtained the chief complaint(s) and history of present illness.  I confirmed and edited as  necessary the review of systems, past medical/surgical history, family history, social history, and examination findings as documented by others; and I examined the patient myself.  I personally reviewed the relevant tests, images, and reports as documented above.  I formulated and edited as necessary the assessment and plan and discussed the findings and management plan with the patient and family. - Dusty Maldonado MD

## 2023-08-14 NOTE — NURSING NOTE
"Dry Eye(s) Follow-Up        Comments  Patient here for a 4 week follow up of dry eye and papillary conjunctivitis. Eyes feel about the same, vision feels a \"little bit blurred\", R>L. Using FML 4x/day, Maxitrol ointment at night, AT's PRN. States vision is fluctuating a lot during the day, especially when it is bright outside. Has only used 1 drop FML this AM.     "

## 2023-08-17 ENCOUNTER — OFFICE VISIT (OUTPATIENT)
Dept: OPHTHALMOLOGY | Facility: CLINIC | Age: 48
End: 2023-08-17
Attending: OPHTHALMOLOGY
Payer: COMMERCIAL

## 2023-08-17 DIAGNOSIS — H16.231 NEUROTROPHIC KERATOCONJUNCTIVITIS OF RIGHT EYE: ICD-10-CM

## 2023-08-17 DIAGNOSIS — H04.123 DRY EYE SYNDROME OF BOTH EYES: ICD-10-CM

## 2023-08-17 DIAGNOSIS — H17.9 CORNEAL SCAR: ICD-10-CM

## 2023-08-17 DIAGNOSIS — H02.59 FLOPPY EYELID SYNDROME OF RIGHT EYE: Primary | ICD-10-CM

## 2023-08-17 DIAGNOSIS — H10.89 PAPILLARY CONJUNCTIVITIS: ICD-10-CM

## 2023-08-17 PROCEDURE — 92285 EXTERNAL OCULAR PHOTOGRAPHY: CPT | Performed by: OPHTHALMOLOGY

## 2023-08-17 PROCEDURE — G0463 HOSPITAL OUTPT CLINIC VISIT: HCPCS | Performed by: OPHTHALMOLOGY

## 2023-08-17 PROCEDURE — 99214 OFFICE O/P EST MOD 30 MIN: CPT | Mod: GC | Performed by: OPHTHALMOLOGY

## 2023-08-17 ASSESSMENT — VISUAL ACUITY
OD_SC: 20/60
OD_PH_SC+: -2
OS_SC: 20/25
OD_PH_SC: 20/50
METHOD: SNELLEN - LINEAR

## 2023-08-17 ASSESSMENT — TONOMETRY
OD_IOP_MMHG: 09
IOP_METHOD: ICARE
OS_IOP_MMHG: 13

## 2023-08-17 ASSESSMENT — SLIT LAMP EXAM - LIDS: COMMENTS: NORMAL

## 2023-08-17 ASSESSMENT — CONF VISUAL FIELD
OS_SUPERIOR_NASAL_RESTRICTION: 0
OS_INFERIOR_TEMPORAL_RESTRICTION: 0
OS_NORMAL: 1
OD_INFERIOR_NASAL_RESTRICTION: 0
OD_NORMAL: 1
OD_SUPERIOR_TEMPORAL_RESTRICTION: 0
OD_INFERIOR_TEMPORAL_RESTRICTION: 0
OD_SUPERIOR_NASAL_RESTRICTION: 0
OS_SUPERIOR_TEMPORAL_RESTRICTION: 0
OS_INFERIOR_NASAL_RESTRICTION: 0

## 2023-08-17 NOTE — NURSING NOTE
Chief Complaints and History of Present Illnesses   Patient presents with    Corneal Evaluation     Chief Complaint(s) and History of Present Illness(es)       Corneal Evaluation              Laterality: both eyes    Onset: 3 days ago              Comments    Pt. States that he has had worsening dryness, itching and redness BE since having lid surgery 5 years ago. VA is blurry and cloudy at times. No pain BE. No flashes or floaters BE.   Lynette Lucio COT 12:44 PM August 17, 2023

## 2023-08-17 NOTE — PROGRESS NOTES
Chief complaint   Blurry vision right eye     HPI    Efrain Lock 48 year old male referred by Dr. Maldonado for dry eye both eyes and right eye corneal scar evaluation. Patient reports years of blurry vision in the right eye. Sometimes right eye is itchy, and sometimes the right eye is red, sensitive to light always needs sunglasses. He thinks these symptoms started after the eyelid surgery in 2020 (s/p wedge resection upper eyelid for floppy eyelid syndrome, and right lower eyelid ectropion with LTS) No eye pain. Left eye does not bother him. Dilated exam 2/20/23 was within normal limits.       Chief Complaint(s) and History of Present Illness(es)       Corneal Evaluation    In both eyes.  This started 3 days ago.             Comments    Pt. States that he has had worsening dryness, itching and redness BE since having lid surgery 5 years ago. VA is blurry and cloudy at times. No pain BE. No flashes or floaters BE.   Lynette Valdes COT 12:44 PM August 17, 2023                      Drops Currently Taking   Preservative free artificial tears q1-2h while awake  Use refresh pm or celluvisc four times a day    Moisture chamber goggles provided from clinic       Past ocular history   Prior eye surgery/laser/Trauma:   12/3/2020 (Andrzej)  1. Right upper eyelid full thickness wedge resection (greater than 25% of eyelid).   2. Right lower eyelid ectropion repair with lateral tarsal strip procedure.     Right eyelid nevus excision     CTL wearer:No  Glasses : reading glasses sometimes  Family Hx of eye disease: father has glaucoma    PMH     Past Medical History:   Diagnosis Date    Diabetes (H)     HTN (hypertension)     Microalbuminuria 10/30/2013    Sleep apnea     Tobacco abuse 07/12/2012       PS     Past Surgical History:   Procedure Laterality Date    LAPAROSCOPIC APPENDECTOMY N/A 3/23/2023    Procedure: APPENDECTOMY, LAPAROSCOPIC;  Surgeon: Nabeel Zabala MD;  Location: [facility] OR    REPAIR ECTROPION Right  12/3/2020    Procedure: Right lower eyelid ectropion repair;  Surgeon: Oanh Donnelly MD;  Location: [facility] [place] OR    REPAIR PTOSIS      SURGICAL HISTORY OF -       rt knee surgery    WEDGE RESECTION EYELID Right 12/3/2020    Procedure: Right upper eyelid pentagonal wedge;  Surgeon: Oanh Donnelly MD;  Location: [facility] [place] OR       [facility]     Current Outpatient Medications   Medication    amLODIPine (NORVASC) 10 MG tablet    atorvastatin (LIPITOR) 20 MG tablet    cyclobenzaprine (FLEXERIL) 5 MG tablet    fluorometholone (FML LIQUIFILM) 0.1 % ophthalmic suspension    hydrocortisone 2.5 % ointment    losartan (COZAAR) 100 MG tablet    loteprednol (LOTEMAX) 0.5 % ophthalmic suspension    metFORMIN (GLUCOPHAGE) 500 MG tablet    Multiple Vitamins-Minerals (MULTIVITAMIN ADULT PO)    neomycin-polymixin-dexamethasone (MAXITROL) ophthalmic ointment    Semaglutide, 1 MG/DOSE, (OZEMPIC, 1 MG/DOSE,) 4 MG/3ML SOPN    simethicone (MYLICON) 80 MG chewable tablet     No current facility-administered medications for this visit.       Labs   None    Imaging   None      Assessment/Plan   # Neurotrophic keratopathy, right eye   # Dry Eye Syndrome  Corneal sensation greatly diminished right eye cotton tip test 8/17/23  Sleeps on his right side on his stomach and has very floppy eyelids - likely etiology   No history of brain surgery, no history of glaucoma drops, +DM  Previously had punctal plugs however they fell out.  PFAT q 1 hour  Continue artificial tear ointment QHS  Consider restasis BID ou in future  Continue FML QID right eye.  Start eyeshield at night - discussed need for tx of Floppy eyelid right eye.  - will rexamine in 3 - 4 weeks to see if sx resolving.    #Papillary conjunctivitis, right eye   Leukoplakia and flat papillomatous lesion vs scar? upper eyelid palpebral conjunctiva temporally.  - see Dr SHARIF For eval.    #Floppy eye lid syndrome  #Entropion right eye  S/p 12/3/2020 (Andrzej):  1. Right upper eyelid full  thickness wedge resection (greater than 25% of eyelid).   2. Right lower eyelid ectropion repair with lateral tarsal strip procedure.       Follow up: 3 - 4 weeks - call ssoner tete stravon navarrete.  Oph:      Rosa Moncada MD  Cornea and External Disease Fellow  [facility]     Attending Physician Attestation:  Complete documentation of historical and exam elements from today's encounter can be found in the full encounter summary report (not reduplicated in this progress note).  I personally obtained the chief complaint(s) and history of present illness.  I confirmed and edited as necessary the review of systems, past medical/surgical history, family history, social history, and examination findings as documented by others; and I examined the patient myself.  I personally reviewed the relevant tests, images, and reports as documented above.  I formulated and edited as necessary the assessment and plan and discussed the findings and management plan with the patient and family. - Joby Rubalcava MD

## 2023-09-07 ENCOUNTER — OFFICE VISIT (OUTPATIENT)
Dept: OPHTHALMOLOGY | Facility: CLINIC | Age: 48
End: 2023-09-07
Attending: OPHTHALMOLOGY
Payer: COMMERCIAL

## 2023-09-07 DIAGNOSIS — H02.59 FLOPPY EYELID SYNDROME OF RIGHT EYE: ICD-10-CM

## 2023-09-07 DIAGNOSIS — H04.123 DRY EYE SYNDROME OF BOTH EYES: ICD-10-CM

## 2023-09-07 DIAGNOSIS — H10.89 PAPILLARY CONJUNCTIVITIS: ICD-10-CM

## 2023-09-07 DIAGNOSIS — H02.053 TRICHIASIS OF RIGHT EYE WITHOUT ENTROPION: Primary | ICD-10-CM

## 2023-09-07 DIAGNOSIS — H17.9 CORNEAL SCAR: ICD-10-CM

## 2023-09-07 PROCEDURE — 99214 OFFICE O/P EST MOD 30 MIN: CPT | Mod: 25 | Performed by: OPHTHALMOLOGY

## 2023-09-07 PROCEDURE — 67820 REVISE EYELASHES: CPT | Performed by: OPHTHALMOLOGY

## 2023-09-07 PROCEDURE — G0463 HOSPITAL OUTPT CLINIC VISIT: HCPCS | Performed by: OPHTHALMOLOGY

## 2023-09-07 RX ORDER — FLUOROMETHOLONE 0.1 %
1 SUSPENSION, DROPS(FINAL DOSAGE FORM)(ML) OPHTHALMIC (EYE) 2 TIMES DAILY
Qty: 10 ML | Refills: 4 | Status: SHIPPED | OUTPATIENT
Start: 2023-09-07 | End: 2023-10-10

## 2023-09-07 RX ORDER — MINERAL OIL, PETROLATUM 425; 573 MG/G; MG/G
OINTMENT OPHTHALMIC
Qty: 3.5 G | Refills: 11 | Status: SHIPPED | OUTPATIENT
Start: 2023-09-07 | End: 2024-03-25

## 2023-09-07 RX ORDER — CARBOXYMETHYLCELLULOSE SODIUM 10 MG/ML
1 GEL OPHTHALMIC
Qty: 120 EACH | Refills: 11 | Status: SHIPPED | OUTPATIENT
Start: 2023-09-07 | End: 2024-09-25

## 2023-09-07 ASSESSMENT — CONF VISUAL FIELD
OS_SUPERIOR_TEMPORAL_RESTRICTION: 0
OS_INFERIOR_TEMPORAL_RESTRICTION: 0
METHOD: COUNTING FINGERS
OD_NORMAL: 1
OD_INFERIOR_TEMPORAL_RESTRICTION: 0
OS_INFERIOR_NASAL_RESTRICTION: 0
OD_INFERIOR_NASAL_RESTRICTION: 0
OS_NORMAL: 1
OS_SUPERIOR_NASAL_RESTRICTION: 0
OD_SUPERIOR_TEMPORAL_RESTRICTION: 0
OD_SUPERIOR_NASAL_RESTRICTION: 0

## 2023-09-07 ASSESSMENT — SLIT LAMP EXAM - LIDS: COMMENTS: NORMAL

## 2023-09-07 ASSESSMENT — TONOMETRY
OD_IOP_MMHG: 13
IOP_METHOD: ICARE
OS_IOP_MMHG: 14

## 2023-09-07 ASSESSMENT — VISUAL ACUITY
OD_PH_SC+: -1
OS_SC: 20/20
OD_SC: 20/40
OD_SC+: -2
METHOD: SNELLEN - LINEAR
OS_SC+: -1
OD_PH_SC: 20/30

## 2023-09-07 NOTE — NURSING NOTE
Chief Complaints and History of Present Illnesses   Patient presents with    Follow Up     4 week follow up Floppy eyelid syndrome of right eye     Chief Complaint(s) and History of Present Illness(es)       Follow Up              Associated symptoms: Negative for eye pain    Treatments tried: eye drops and ointment    Pain scale: 0/10    Comments: 4 week follow up Floppy eyelid syndrome of right eye              Comments    Pt states he wore eye shield at night for 2 weeks after last visit.  No eye pain or discomfort today  Pt feels that vision fluctuates day to day.  Compliant with eyedrop schedule.     Adebayo Holley 9:44 AM September 7, 2023

## 2023-09-07 NOTE — PROGRESS NOTES
Chief complaint   Blurry vision right eye     HPI    Efrain Lock 48 year old male referred by Dr. Maldonado for dry eye both eyes and right eye corneal scar evaluation. Patient reports years of blurry vision in the right eye. Sometimes right eye is itchy, and sometimes the right eye is red, sensitive to light always needs sunglasses. He thinks these symptoms started after the eyelid surgery in 2020 (s/p wedge resection upper eyelid for floppy eyelid syndrome, and right lower eyelid ectropion with LTS) No eye pain. Left eye does not bother him. Dilated exam 2/20/23 was within normal limits.     Interval 9/7/23: Patient wore right eye shield during sleep because of floppy eyelids and sleeps on his stomach for two weeks. He started using CPAP and now sleeps on back, no longer rubbing his eye. Vision is slightly better and eye is comfortable. No pain. Eye is still itchy.       Chief Complaint(s) and History of Present Illness(es)       Corneal Evaluation    In both eyes.  This started 3 days ago.             Comments    Pt. States that he has had worsening dryness, itching and redness BE since having lid surgery 5 years ago. VA is blurry and cloudy at times. No pain BE. No flashes or floaters BE.   Lynette Valdes COT 12:44 PM August 17, 2023                      Drops Currently Taking       Maxitrol ointment at bedtime right eye   FML QID right eye   Preservative free artificial tears q1-2h while awake    Past ocular history   Prior eye surgery/laser/Trauma:   12/3/2020 (Andrzej)  1. Right upper eyelid full thickness wedge resection (greater than 25% of eyelid).   2. Right lower eyelid ectropion repair with lateral tarsal strip procedure.     Right eyelid nevus excision     CTL wearer:No  Glasses : reading glasses sometimes  Family Hx of eye disease: father has glaucoma    PMH     Past Medical History:   Diagnosis Date    Diabetes (H)     HTN (hypertension)     Microalbuminuria 10/30/2013    Sleep apnea      Tobacco abuse 07/12/2012       H     Past Surgical History:   Procedure Laterality Date    LAPAROSCOPIC APPENDECTOMY N/A 3/23/2023    Procedure: APPENDECTOMY, LAPAROSCOPIC;  Surgeon: Nabeel Zabala MD;  Location:  OR    REPAIR ECTROPION Right 12/3/2020    Procedure: Right lower eyelid ectropion repair;  Surgeon: Oanh Donnelly MD;  [place] [facility] [place]    REPAIR PTOSIS      SURGICAL HISTORY OF -       rt knee surgery    WEDGE RESECTION EYELID Right 12/3/2020    Procedure: Right upper eyelid pentagonal wedge;  Surgeon: Oanh Donnelly MD;  [place] [facility] [place]       Meds     Current Outpatient Medications   Medication    amLODIPine (NORVASC) 10 MG tablet    atorvastatin (LIPITOR) 20 MG tablet    cyclobenzaprine (FLEXERIL) 5 MG tablet    fluorometholone (FML LIQUIFILM) 0.1 % ophthalmic suspension    hydrocortisone 2.5 % ointment    losartan (COZAAR) 100 MG tablet    loteprednol (LOTEMAX) 0.5 % ophthalmic suspension    metFORMIN (GLUCOPHAGE) 500 MG tablet    Multiple Vitamins-Minerals (MULTIVITAMIN ADULT PO)    neomycin-polymixin-dexamethasone (MAXITROL) ophthalmic ointment    Semaglutide, 1 MG/DOSE, (OZEMPIC, 1 MG/DOSE,) 4 MG/3ML SOPN    simethicone (MYLICON) 80 MG chewable tablet     No current facility-administered medications for this visit.       Labs   None    Imaging   None      Assessment/Plan   # Neurotrophic keratopathy, right eye   # Dry Eye Syndrome  Corneal sensation greatly diminished right eye cotton tip test 8/17/23  Sleeps on his right side on his stomach and has very floppy eyelids - likely etiology   No history of brain surgery, no history of glaucoma drops, +DM  Previously had punctal plugs however they fell out.  - Continue PFAT q 1 hour-   - Change maxitrol ointment at bedtime to artificial tear ointment QHS  - Taper FML 3/2/1 bid right eye.  - Start eye goggles at night  - discussed need for tx of Floppy eyelid right eye.  - Epilated several upper eyelid trichiatic lashes today  9/7/23 could be contributing to ocular surface disruption    #Papillary conjunctivitis, right eye   Leukoplakia and flat papillomatous lesion vs scar? upper eyelid palpebral conjunctiva temporally.  Photo below  - see Dr SHARIF For eval.      #Floppy eye lid syndrome  #Entropion right eye  S/p 12/3/2020 (Andrzej):  1. Right upper eyelid full thickness wedge resection (greater than 25% of eyelid).   2. Right lower eyelid ectropion repair with lateral tarsal strip procedure.       Follow up: 4 weeks - call ssoner tete stravon worsen.  Oculoplastics right floppy eyelids evaluation and palpebral conjunctival lesion biopsy 1 st available.      Rosa Moncada MD  Cornea and External Disease Fellow  [facility]     Attending Physician Attestation:  Complete documentation of historical and exam elements from today's encounter can be found in the full encounter summary report (not reduplicated in this progress note).  I personally obtained the chief complaint(s) and history of present illness.  I confirmed and edited as necessary the review of systems, past medical/surgical history, family history, social history, and examination findings as documented by others; and I examined the patient myself.  I personally reviewed the relevant tests, images, and reports as documented above.  I formulated and edited as necessary the assessment and plan and discussed the findings and management plan with the patient and family. - Joby Rubalcava MD      I was present for the entire procedure(s). - Joby Rubalcava MD

## 2023-09-07 NOTE — PATIENT INSTRUCTIONS
Right eye drops and ointment:    - Fluoromethalone (steroid) 2 times a day  - Stop neomycin polymyxin B and dexamethesone ointment.  - start refresh Pm or Retain Ointment or Systane Night time ointment at bed time.  - use artificial tear drops 6 to 8 times a day.

## 2023-09-30 ENCOUNTER — HEALTH MAINTENANCE LETTER (OUTPATIENT)
Age: 48
End: 2023-09-30

## 2023-10-10 ENCOUNTER — OFFICE VISIT (OUTPATIENT)
Dept: OPHTHALMOLOGY | Facility: CLINIC | Age: 48
End: 2023-10-10
Attending: OPHTHALMOLOGY
Payer: COMMERCIAL

## 2023-10-10 DIAGNOSIS — H10.89 PAPILLARY CONJUNCTIVITIS: ICD-10-CM

## 2023-10-10 DIAGNOSIS — H17.9 CORNEAL SCAR: ICD-10-CM

## 2023-10-10 DIAGNOSIS — H52.4 PRESBYOPIA: ICD-10-CM

## 2023-10-10 DIAGNOSIS — H02.59 FLOPPY EYELID SYNDROME OF RIGHT EYE: ICD-10-CM

## 2023-10-10 DIAGNOSIS — H04.123 DRY EYE SYNDROME OF BOTH EYES: Primary | ICD-10-CM

## 2023-10-10 PROCEDURE — 99214 OFFICE O/P EST MOD 30 MIN: CPT | Mod: GC | Performed by: OPHTHALMOLOGY

## 2023-10-10 PROCEDURE — 99214 OFFICE O/P EST MOD 30 MIN: CPT | Performed by: OPHTHALMOLOGY

## 2023-10-10 RX ORDER — FLUOROMETHOLONE 0.1 %
1 SUSPENSION, DROPS(FINAL DOSAGE FORM)(ML) OPHTHALMIC (EYE) DAILY
Qty: 10 ML | Refills: 4 | Status: SHIPPED | OUTPATIENT
Start: 2023-10-10 | End: 2024-05-15

## 2023-10-10 ASSESSMENT — CONF VISUAL FIELD
OD_SUPERIOR_TEMPORAL_RESTRICTION: 0
OS_INFERIOR_TEMPORAL_RESTRICTION: 0
METHOD: COUNTING FINGERS
OD_SUPERIOR_NASAL_RESTRICTION: 0
OD_INFERIOR_TEMPORAL_RESTRICTION: 0
OD_INFERIOR_NASAL_RESTRICTION: 0
OS_INFERIOR_NASAL_RESTRICTION: 0
OS_SUPERIOR_NASAL_RESTRICTION: 0
OD_NORMAL: 1
OS_NORMAL: 1
OS_SUPERIOR_TEMPORAL_RESTRICTION: 0

## 2023-10-10 ASSESSMENT — VISUAL ACUITY
OD_SC+: -2
METHOD: SNELLEN - LINEAR
OD_PH_SC: 20/25
OS_SC: 20/25
OD_SC: 20/40
OD_PH_SC+: -2
OS_SC+: -2

## 2023-10-10 ASSESSMENT — SLIT LAMP EXAM - LIDS: COMMENTS: NORMAL

## 2023-10-10 ASSESSMENT — TONOMETRY
OD_IOP_MMHG: 8
OS_IOP_MMHG: 10
IOP_METHOD: ICARE

## 2023-10-10 NOTE — NURSING NOTE
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  Last seen September 7, 2023.      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  Last seen September 7, 2023.               Comments    Pt reports no noticeable changes in vision since last seen, BE  Light sensitivity comes and goes, not new per pt.   Compliant with drops and taking as prescribed; lgtts 7:30 am     KOLE Raygoza OA, October 10, 2023

## 2023-10-10 NOTE — PROGRESS NOTES
Chief complaint   Follow up    HPI    Efrain Lock 48 year old male referred by Dr. Maldonado for dry eye both eyes and right eye corneal scar evaluation. Patient reports years of blurry vision in the right eye. Sometimes right eye is itchy, and sometimes the right eye is red, sensitive to light always needs sunglasses. He thinks these symptoms started after the eyelid surgery in 2020 (s/p wedge resection upper eyelid for floppy eyelid syndrome, and right lower eyelid ectropion with LTS) No eye pain. Left eye does not bother him. Dilated exam 2/20/23 was within normal limits.     Interval 10/10/23: Patient here for follow up of neurotrophic cornea with dry eye. Was last seen 9/7/23. Was referred at last visit to plastics for floppy lid and palpebral lesion eval but has not yet made appt or been seen by our plastics team. Has not tried eye goggles at bedtime, not interested in these.    Drops Currently Taking     Artificial tear ointment at bedtime right eye   FML BID right eye   Preservative free artificial tears q1-2h while awake    Past ocular history   Prior eye surgery/laser/Trauma:   12/3/2020 (Andrzej)  1. Right upper eyelid full thickness wedge resection (greater than 25% of eyelid).   2. Right lower eyelid ectropion repair with lateral tarsal strip procedure.     Right eyelid nevus excision     CTL wearer:No  Glasses : reading glasses sometimes  Family Hx of eye disease: father has glaucoma    PMH     Past Medical History:   Diagnosis Date    Diabetes (H)     HTN (hypertension)     Microalbuminuria 10/30/2013    Sleep apnea     Tobacco abuse 07/12/2012       PSH     Past Surgical History:   Procedure Laterality Date    LAPAROSCOPIC APPENDECTOMY N/A 3/23/2023    Procedure: APPENDECTOMY, LAPAROSCOPIC;  Surgeon: Nabeel Zabala MD;  Location:  OR    REPAIR ECTROPION Right 12/3/2020    Procedure: Right lower eyelid ectropion repair;  Surgeon: Oanh Donnelly MD;  [place] [facility] [place]    REPAIR  PTOSIS      SURGICAL HISTORY OF -       rt knee surgery    WEDGE RESECTION EYELID Right 12/3/2020    Procedure: Right upper eyelid pentagonal wedge;  Surgeon: Oanh Donnelly MD;  [place] [facility] [place]       [facility]     Current Outpatient Medications   Medication    amLODIPine (NORVASC) 10 MG tablet    atorvastatin (LIPITOR) 20 MG tablet    carboxymethylcellulose PF (REFRESH LIQUIGEL) 1 % ophthalmic gel    cyclobenzaprine (FLEXERIL) 5 MG tablet    fluorometholone (FML LIQUIFILM) 0.1 % ophthalmic suspension    hydrocortisone 2.5 % ointment    losartan (COZAAR) 100 MG tablet    metFORMIN (GLUCOPHAGE) 500 MG tablet    Multiple Vitamins-Minerals (MULTIVITAMIN ADULT PO)    Semaglutide, 1 MG/DOSE, (OZEMPIC, 1 MG/DOSE,) 4 MG/3ML SOPN    simethicone (MYLICON) 80 MG chewable tablet    White Petrolatum-Mineral Oil (REFRESH P.M.) OINT    loteprednol (LOTEMAX) 0.5 % ophthalmic suspension     No current facility-administered medications for this visit.       Labs   None    Imaging   None      Assessment/Plan   # Neurotrophic keratopathy, right eye   # Dry Eye Syndrome  Corneal sensation greatly diminished right eye cotton tip test 8/17/23  Used to sleep on his right side on his stomach and has very floppy eyelids - likely etiology - No he says that he sleeps on back (see flopy eye lid note)  No history of brain surgery, no history of glaucoma drops, +DM  Previously had punctal plugs however they fell out.  - Epilated several upper eyelid trichiatic lashes 9/7/23 could be contributing to ocular surface disruption  - Continue PFAT q 1 hour-   - Continue artificial tear ointment QHS  - FML daily OD    #Papillary conjunctivitis, right eye   Leukoplakia and flat papillomatous lesion vs scar? upper eyelid palpebral conjunctiva temporally.  Photo below  - see Dr SHARIF For eval.      #Floppy eye lid syndrome each eye.  #Entropion right eye  S/p 12/3/2020 (Andrzej):  1. Right upper eyelid full thickness wedge resection (greater than 25% of  eyelid).   2. Right lower eyelid ectropion repair with lateral tarsal strip procedure.   - 10/10/23: pt did not see oculoplasticvs and is not wering eye shiels but is not rubbing eye(s) and sleeps only on his back (he says).      Follow up: 3 - 4 cornbea - call sooner with any problems.  RE - consult. Oculoplastics right floppy eyelids evaluation and palpebral conjunctival lesion biopsy 1 st available.      Inés Bentley MD  Resident Physician, PGY-3  Department of Ophthalmology      Attending Physician Attestation:  Complete documentation of historical and exam elements from today's encounter can be found in the full encounter summary report (not reduplicated in this progress note).  I personally obtained the chief complaint(s) and history of present illness.  I confirmed and edited as necessary the review of systems, past medical/surgical history, family history, social history, and examination findings as documented by others; and I examined the patient myself.  I personally reviewed the relevant tests, images, and reports as documented above.  I formulated and edited as necessary the assessment and plan and discussed the findings and management plan with the patient and family. - Joby Rubalcava MD

## 2023-10-20 DIAGNOSIS — E11.9 TYPE 2 DIABETES, HBA1C GOAL < 7% (H): ICD-10-CM

## 2023-10-20 NOTE — LETTER
November 2, 2023      Efrain MABEL Ashvin  [address] [address]          Dear Mr. Lock,    Our records indicate that it is time to schedule a visit with your primary care provider.  You are due to be seen for  a follow up on diabetes.  We have sent to the pharmacy a  1 time  refill of your medication until you can be seen by your provider.  You may call [phone] to schedule or via [email or web address] using the appointment tab.  If you are no longer a [facility] patient; please contact us and let us know that as well.  You will need to let the pharmacy know the name of your new provider so that they can send future refill requests to them.    Sincerely,    [facility] - [place]

## 2023-10-24 NOTE — TELEPHONE ENCOUNTER
Patient is overdue for recheck on diabetes.  Please have him schedule an appointment.  Refill ordered on the medication    Henry Cesar MD  [facility], [place] [facility]

## 2023-11-07 ENCOUNTER — OFFICE VISIT (OUTPATIENT)
Dept: OPHTHALMOLOGY | Facility: CLINIC | Age: 48
End: 2023-11-07
Payer: COMMERCIAL

## 2023-11-07 ENCOUNTER — TELEPHONE (OUTPATIENT)
Dept: OPHTHALMOLOGY | Facility: CLINIC | Age: 48
End: 2023-11-07

## 2023-11-07 DIAGNOSIS — H02.59 FLOPPY EYELID SYNDROME OF RIGHT EYE: ICD-10-CM

## 2023-11-07 DIAGNOSIS — H02.023 MECHANICAL ENTROPION OF RIGHT EYE: ICD-10-CM

## 2023-11-07 DIAGNOSIS — H02.132 SENILE ECTROPION OF RIGHT LOWER EYELID: ICD-10-CM

## 2023-11-07 DIAGNOSIS — H02.403 INVOLUTIONAL PTOSIS, ACQUIRED, BILATERAL: ICD-10-CM

## 2023-11-07 DIAGNOSIS — H04.123 DRY EYE SYNDROME OF BOTH EYES: Primary | ICD-10-CM

## 2023-11-07 DIAGNOSIS — H02.831 DERMATOCHALASIS OF BOTH UPPER EYELIDS: ICD-10-CM

## 2023-11-07 DIAGNOSIS — H02.834 DERMATOCHALASIS OF BOTH UPPER EYELIDS: ICD-10-CM

## 2023-11-07 PROCEDURE — 99214 OFFICE O/P EST MOD 30 MIN: CPT | Mod: GC | Performed by: OPHTHALMOLOGY

## 2023-11-07 PROCEDURE — 92081 LIMITED VISUAL FIELD XM: CPT | Mod: GC | Performed by: OPHTHALMOLOGY

## 2023-11-07 PROCEDURE — 92285 EXTERNAL OCULAR PHOTOGRAPHY: CPT | Mod: GC | Performed by: OPHTHALMOLOGY

## 2023-11-07 ASSESSMENT — SLIT LAMP EXAM - LIDS: COMMENTS: NORMAL

## 2023-11-07 ASSESSMENT — VISUAL ACUITY
OD_PH_SC+: -1
OD_PH_SC: 20/40
OS_SC: 20/20
OS_SC+: -1
METHOD: SNELLEN - LINEAR
OD_SC: 20/50

## 2023-11-07 ASSESSMENT — CONF VISUAL FIELD
OD_SUPERIOR_NASAL_RESTRICTION: 3
OD_SUPERIOR_TEMPORAL_RESTRICTION: 3
OS_INFERIOR_TEMPORAL_RESTRICTION: 0
OS_SUPERIOR_TEMPORAL_RESTRICTION: 3
OD_INFERIOR_NASAL_RESTRICTION: 0
OS_INFERIOR_NASAL_RESTRICTION: 0
OD_INFERIOR_TEMPORAL_RESTRICTION: 0
OS_SUPERIOR_NASAL_RESTRICTION: 0
METHOD: COUNTING FINGERS

## 2023-11-07 ASSESSMENT — TONOMETRY
OS_IOP_MMHG: 10
OD_IOP_MMHG: 8
IOP_METHOD: ICARE

## 2023-11-07 NOTE — NURSING NOTE
Chief Complaints and History of Present Illnesses   Patient presents with    Consult For     Here for a Consult by recommendation of Dr Rubalcava due to right floppy eyelid evaluation and possible palpebral conjunctival lesion biopsy     Chief Complaint(s) and History of Present Illness(es)       Consult For              Laterality: both eyes    Associated symptoms: dryness and tearing.  Negative for eye pain and discharge    Treatments tried: artificial tears    Pain scale: 0/10    Comments: Here for a Consult by recommendation of Dr Rubalcava due to right floppy eyelid evaluation and possible palpebral conjunctival lesion biopsy              Comments    Patient reports right eye is very dry and hopes that eye lid surgery would improve this for him. He states this has been a bother for the past 2 years. Vision is also blurry right eye as well. Tearing with right eye trys to lubricant but no help. Patient report he was unaware of the conjunctival lesion and did not remark about this. Not noticing symptoms with left eye.     NERY Randhawa COT 9:04 AM November 7, 2023

## 2023-11-07 NOTE — TELEPHONE ENCOUNTER
Date Scheduled: 1-4-24  Facility: Surgery Locations: [facility]  Surgeon: Dr. Donnelly   Post-op appointment scheduled: 1/16/24   scheduled?: No  Surgery packet/instructions confirmed received?  Yes-mailed  Pre op physical/PAC appointment:   Special Considerations:     Lizz Hernández  Surgery Scheduling Coordinator  Ph: [phone]

## 2023-11-07 NOTE — TELEPHONE ENCOUNTER
Spoke with patient regarding calling me back to schedule a procedure biopsy appt. Patient scheduled accordingly and patient is aware of time, date, and location.

## 2023-11-07 NOTE — PROGRESS NOTES
Chief Complaint(s) and History of Present Illness(es)     Consult For            Laterality: both eyes    Associated symptoms: dryness and tearing.  Negative for eye pain and   discharge    Treatments tried: artificial tears    Pain scale: 0/10    Comments: Here for a Consult by recommendation of Dr Rubalcava due to right   floppy eyelid evaluation and possible palpebral conjunctival lesion biopsy            Comments    Patient reports right eye is very dry and hopes that eye lid surgery would   improve this for him. He states this has been a bother for the past 2   years. Vision is also blurry right eye as well. Tearing with right eye   trys to lubricant but no help. Patient report he was unaware of the   conjunctival lesion and did not remark about this. Not noticing symptoms   with left eye.   Reports FML drops to be used daily right eye but lost this and has not   used for the past 3-4 weeks.     Allison Guerrero, COT COT 9:04 AM November 7, 2023        Patient also with  bilateral upper eyelid dermatochalasis, but not bothered by it. Patel VF today shows 10 degrees untaped and 50 degrees taped bilaterally. MRD1 of 1 mm bilaterally.     Lab Results   Component Value Date    A1C 6.5 01/04/2023    A1C 9.2 10/19/2022    A1C 7.3 11/19/2021    A1C 6.8 08/04/2021    A1C 6.0 08/17/2017    A1C 6.0 04/11/2017    A1C 5.9 10/22/2015     Ptosis visual field: superior visual filed limited to 10 degrees on the right and improves to 45 degrees with manual elevation and on the left it is limited to 10 degrees and improves to 50 degrees with manual elevation.   Assessment & Plan     Efrain Lock is a 48 year old male with the following diagnoses:   Encounter Diagnoses   Name Primary?    Dry eye syndrome of both eyes Yes    Floppy eyelid syndrome of right eye     Mechanical entropion of right eye     Dermatochalasis of both upper eyelids     Senile ectropion of right lower eyelid     Involutional ptosis, acquired, bilateral       History of R lower eyelid ectropion repair, right upper eyelid entropion repair with pentagonal wedge (10/27/2023)  Upper eyelid entropion   - Patient mostly concerned about dryness of the eyes, R > L   - Uses CPAP  - Has trialed moisture chambers in the past but bothered by it  - Significant PEE, R > L   - Interested in surgical repair of the entropion to help with dryness of the right eye   - Denies any blood thinner use, weight loss medications, heart or lung problems     Sabrina Cuello MD  PGY-2  Department of Ophthalmology  November 7, 2023 9:30 AM     History of Right upper lid wedge, and Right lower lid ectropion repair 12/2020    Floppy eyelid syndrome with significant lower eyelid ectropion and upper eyelid ptosis and entropion (lash ptosis) causing keratitis.   Has both upper eyelid ptosis as well, but not symptomatic left eye.     Discussed options.   Plan:   Right lower eyelid ectropion repair (likely full LTS, but can consider closed angle canthopexy). [id number]  Right upper eyelid ptosis repair (ellipse of skin, small levator adv, rotation sutures - levator, tarsal plate, skin edge to try to address entropion) [id number]       Attending Physician Attestation: Complete documentation of historical and exam elements from today's encounter can be found in the full encounter summary report (not reduplicated in this progress note). I personally obtained the chief complaint(s) and history of present illness. I confirmed and edited as necessary the review of systems, past medical/surgical history, family history, social history, and examination findings as documented by others; and I examined the patient myself. I personally reviewed the relevant tests, images, and reports as documented above. I formulated and edited as necessary the assessment and plan and discussed the findings and management plan with the patient.  -Oanh Donnelly MD    Today with Efrain Lock, I reviewed the indications, risks, benefits, and  alternatives of the proposed surgical procedure including, but not limited to, failure obtain the desired result  and need for additional surgery, bleeding, infection, loss of vision, loss of the eye, and the remote possibility of permanent damage to any organ system or death with the use of anesthesia.  I provided multiple opportunities for the questions, answered all questions to the best of my ability, and confirmed that my answers and my discussion were understood.   Oanh Donnelly MD

## 2023-11-07 NOTE — TELEPHONE ENCOUNTER
LVM for patient regarding scheduling patient for procedure appt for biopsy. Left my direct line for patient to call back and schedule.

## 2023-11-08 NOTE — NURSING NOTE
Chief Complaints and History of Present Illnesses   Patient presents with    Consult For     Here for a Consult by recommendation of Dr Rubalcava due to right floppy eyelid evaluation and possible palpebral conjunctival lesion biopsy     Chief Complaint(s) and History of Present Illness(es)       Consult For              Laterality: both eyes    Associated symptoms: dryness and tearing.  Negative for eye pain and discharge    Treatments tried: artificial tears    Pain scale: 0/10    Comments: Here for a Consult by recommendation of Dr Rubalcava due to right floppy eyelid evaluation and possible palpebral conjunctival lesion biopsy              Comments    Patient reports right eye is very dry and hopes that eye lid surgery would improve this for him. He states this has been a bother for the past 2 years. Vision is also blurry right eye as well. Tearing with right eye trys to lubricant but no help. Patient report he was unaware of the conjunctival lesion and did not remark about this. Not noticing symptoms with left eye.   Reports FML drops to be used daily right eye but lost this and has not used for the past 3-4 weeks.     Allison Guerrero, COT COT 9:04 AM November 7, 2023                               unable to assess immunization status...

## 2023-11-10 ENCOUNTER — TELEPHONE (OUTPATIENT)
Dept: OPHTHALMOLOGY | Facility: CLINIC | Age: 48
End: 2023-11-10
Payer: COMMERCIAL

## 2023-11-17 DIAGNOSIS — E11.9 NEW ONSET TYPE 2 DIABETES MELLITUS (H): ICD-10-CM

## 2023-11-17 DIAGNOSIS — I10 BENIGN ESSENTIAL HYPERTENSION: ICD-10-CM

## 2023-11-17 RX ORDER — LOSARTAN POTASSIUM 100 MG/1
100 TABLET ORAL DAILY
Qty: 90 TABLET | Refills: 0 | Status: SHIPPED | OUTPATIENT
Start: 2023-11-17 | End: 2023-12-26

## 2023-11-20 ENCOUNTER — OFFICE VISIT (OUTPATIENT)
Dept: FAMILY MEDICINE | Facility: CLINIC | Age: 48
End: 2023-11-20
Payer: COMMERCIAL

## 2023-11-20 DIAGNOSIS — B36.0 TINEA VERSICOLOR: Primary | ICD-10-CM

## 2023-11-20 DIAGNOSIS — L82.1 SEBORRHEIC KERATOSES: ICD-10-CM

## 2023-11-20 DIAGNOSIS — I10 BENIGN ESSENTIAL HYPERTENSION: ICD-10-CM

## 2023-11-20 PROCEDURE — 99214 OFFICE O/P EST MOD 30 MIN: CPT | Performed by: NURSE PRACTITIONER

## 2023-11-20 RX ORDER — KETOCONAZOLE 20 MG/ML
SHAMPOO TOPICAL
Qty: 120 ML | Refills: 11 | Status: SHIPPED | OUTPATIENT
Start: 2023-11-20 | End: 2024-05-15

## 2023-11-20 ASSESSMENT — PAIN SCALES - GENERAL: PAINLEVEL: NO PAIN (0)

## 2023-11-20 NOTE — PROGRESS NOTES
[facility] Dermatology Note  Encounter Date: Nov 20, 2023  Office Visit     Reviewed patients past medical history and pertinent chart review prior to patients visit today.     Dermatology Problem List:  1. AKs, scalp x4 s/p cryo 3/4/2022   2. Dermatitis, cheeks  - hydrocortisone  3. Tinea versicolor, ketoconazole 2% shampoo given 11/20/2023   4. Diffuse SK on trunk    ____________________________________________    Assessment & Plan:     # tinea versicolor  Discussed diagnosis and treatment options with patient. Prescribed ketoconazole 2% shampoo to use daily as a body wash to the face, neck, chest and back until rash is fully resolved. May use as maintenance to keep rash from recurring 2-3 times weekly if rash seems to come and go, or use with rash recurrence only. Shampoo may be drying, okay to moisturize after bathing or throughout the day for irritation. Sun exposure/tan skin will make rash more obvious as affected areas may stay hypopigmented. Advised not to wear shirts/bras more than once without washing while rash is present.     # Seborrheic Keratosis. Benign, no further treatment needed.     Lizz Willard, [facility]  Dermatology    _______________________________________    CC: Rash (C/O rash on back)    HPI:  Mr. Efrain Lock is a(n) 48 year old male who presents today as a return patient for brown spots on his back.  He says his wife has noticed these spots developing and is getting more of them.  He is not bothered by these and they are asymptomatic.  He would not like them treated if they are benign.  He also has a diffuse rash on his upper body.  He has had this before and it used to be more whitened but now it is more red.  It does not seem to be symptomatic.    Patient is otherwise feeling well, without additional skin concerns.      Physical Exam:  SKIN: Waist-up skin, which includes the head/face, neck, both arms, chest, back, abdomen, digits and/or nails was examined.  -waxy, stuck  on tan/brown papules and patches on the trunk   -Diffuse pink discrete finely scaly patches on the chest back upper arms and cheeks    - No other lesions of concern on areas examined.     Medications:  Current Outpatient Medications   Medication    amLODIPine (NORVASC) 10 MG tablet    atorvastatin (LIPITOR) 20 MG tablet    carboxymethylcellulose PF (REFRESH LIQUIGEL) 1 % ophthalmic gel    fluorometholone (FML LIQUIFILM) 0.1 % ophthalmic suspension    hydrocortisone 2.5 % ointment    losartan (COZAAR) 100 MG tablet    loteprednol (LOTEMAX) 0.5 % ophthalmic suspension    metFORMIN (GLUCOPHAGE) 500 MG tablet    Multiple Vitamins-Minerals (MULTIVITAMIN ADULT PO)    White Petrolatum-Mineral Oil (REFRESH P.M.) OINT    cyclobenzaprine (FLEXERIL) 5 MG tablet    Semaglutide, 1 MG/DOSE, (OZEMPIC, 1 MG/DOSE,) 4 MG/3ML SOPN    simethicone (MYLICON) 80 MG chewable tablet     No current facility-administered medications for this visit.      Past Medical History:   Patient Active Problem List   Diagnosis    Tobacco abuse    Essential hypertension, benign    Morbid obesity (H)    Family history of prostate cancer    Microalbuminuria    Hyperlipidemia LDL goal <100    Left shoulder pain    Floppy eyelid syndrome of right eye    Senile ectropion of right lower eyelid    Mechanical entropion of right eye    Corneal scar    New onset type 2 diabetes mellitus (H)    Hypokalemia    GIULIA (obstructive sleep apnea)    Perforated appendicitis    Dry eye syndrome of both eyes    Dermatochalasis of both upper eyelids     Past Medical History:   Diagnosis Date    Diabetes (H)     HTN (hypertension)     Microalbuminuria 10/30/2013    Sleep apnea     Tobacco abuse 07/12/2012       CC Referred Self, MD  No address on file on close of this encounter.

## 2023-11-20 NOTE — LETTER
11/20/2023         RE: Efrain Lock  [address]  [place] [address]        Dear Colleague,    Thank you for referring your patient, Efrain Lock, to the [facility]. Please see a copy of my visit note below.    [facility] Dermatology Note  Encounter Date: Nov 20, 2023  Office Visit     Reviewed patients past medical history and pertinent chart review prior to patients visit today.     Dermatology Problem List:  1. AKs, scalp x4 s/p cryo 3/4/2022   2. Dermatitis, cheeks  - hydrocortisone  3. Tinea versicolor, ketoconazole 2% shampoo given 11/20/2023   4. Diffuse SK on trunk    ____________________________________________    Assessment & Plan:     # tinea versicolor  Discussed diagnosis and treatment options with patient. Prescribed ketoconazole 2% shampoo to use daily as a body wash to the face, neck, chest and back until rash is fully resolved. May use as maintenance to keep rash from recurring 2-3 times weekly if rash seems to come and go, or use with rash recurrence only. Shampoo may be drying, okay to moisturize after bathing or throughout the day for irritation. Sun exposure/tan skin will make rash more obvious as affected areas may stay hypopigmented. Advised not to wear shirts/bras more than once without washing while rash is present.     # Seborrheic Keratosis. Benign, no further treatment needed.     Lizz Willard, [facility]  Dermatology    _______________________________________    CC: Rash (C/O rash on back)    HPI:  Mr. Efrain Lock is a(n) 48 year old male who presents today as a return patient for brown spots on his back.  He says his wife has noticed these spots developing and is getting more of them.  He is not bothered by these and they are asymptomatic.  He would not like them treated if they are benign.  He also has a diffuse rash on his upper body.  He has had this before and it used to be more whitened but now it is more red.  It  does not seem to be symptomatic.    Patient is otherwise feeling well, without additional skin concerns.      Physical Exam:  SKIN: Waist-up skin, which includes the head/face, neck, both arms, chest, back, abdomen, digits and/or nails was examined.  -waxy, stuck on tan/brown papules and patches on the trunk   -Diffuse pink discrete finely scaly patches on the chest back upper arms and cheeks    - No other lesions of concern on areas examined.     Medications:  Current Outpatient Medications   Medication     amLODIPine (NORVASC) 10 MG tablet     atorvastatin (LIPITOR) 20 MG tablet     carboxymethylcellulose PF (REFRESH LIQUIGEL) 1 % ophthalmic gel     fluorometholone (FML LIQUIFILM) 0.1 % ophthalmic suspension     hydrocortisone 2.5 % ointment     losartan (COZAAR) 100 MG tablet     loteprednol (LOTEMAX) 0.5 % ophthalmic suspension     metFORMIN (GLUCOPHAGE) 500 MG tablet     Multiple Vitamins-Minerals (MULTIVITAMIN ADULT PO)     White Petrolatum-Mineral Oil (REFRESH P.M.) OINT     cyclobenzaprine (FLEXERIL) 5 MG tablet     Semaglutide, 1 MG/DOSE, (OZEMPIC, 1 MG/DOSE,) 4 MG/3ML SOPN     simethicone (MYLICON) 80 MG chewable tablet     No current facility-administered medications for this visit.      Past Medical History:   Patient Active Problem List   Diagnosis     Tobacco abuse     Essential hypertension, benign     Morbid obesity (H)     Family history of prostate cancer     Microalbuminuria     Hyperlipidemia LDL goal <100     Left shoulder pain     Floppy eyelid syndrome of right eye     Senile ectropion of right lower eyelid     Mechanical entropion of right eye     Corneal scar     New onset type 2 diabetes mellitus (H)     Hypokalemia     GIULIA (obstructive sleep apnea)     Perforated appendicitis     Dry eye syndrome of both eyes     Dermatochalasis of both upper eyelids     Past Medical History:   Diagnosis Date     Diabetes (H)      HTN (hypertension)      Microalbuminuria 10/30/2013     Sleep apnea       Tobacco abuse 07/12/2012       CC Referred Self, MD  No address on file on close of this encounter.       Again, thank you for allowing me to participate in the care of your patient.        Sincerely,        MIKEL Fuentes CNP

## 2023-11-21 RX ORDER — AMLODIPINE BESYLATE 10 MG/1
10 TABLET ORAL DAILY
Qty: 90 TABLET | Refills: 0 | Status: SHIPPED | OUTPATIENT
Start: 2023-11-21 | End: 2023-12-26

## 2023-11-21 NOTE — TELEPHONE ENCOUNTER
Pt schedule appt with Dr Cesar 12/26/2023  Enrique Guerrero, Medical Assistant  [facility]

## 2023-12-26 ENCOUNTER — OFFICE VISIT (OUTPATIENT)
Dept: FAMILY MEDICINE | Facility: CLINIC | Age: 48
End: 2023-12-26
Payer: COMMERCIAL

## 2023-12-26 VITALS
DIASTOLIC BLOOD PRESSURE: 74 MMHG | HEART RATE: 80 BPM | WEIGHT: 237 LBS | BODY MASS INDEX: 39.49 KG/M2 | HEIGHT: 65 IN | OXYGEN SATURATION: 98 % | TEMPERATURE: 97.4 F | SYSTOLIC BLOOD PRESSURE: 123 MMHG | RESPIRATION RATE: 18 BRPM

## 2023-12-26 DIAGNOSIS — Z12.5 SCREENING FOR PROSTATE CANCER: ICD-10-CM

## 2023-12-26 DIAGNOSIS — E11.9 TYPE 2 DIABETES, HBA1C GOAL < 7% (H): ICD-10-CM

## 2023-12-26 DIAGNOSIS — Z01.818 PREOP GENERAL PHYSICAL EXAM: Primary | ICD-10-CM

## 2023-12-26 DIAGNOSIS — E78.5 HYPERLIPIDEMIA LDL GOAL <100: ICD-10-CM

## 2023-12-26 DIAGNOSIS — I10 BENIGN ESSENTIAL HYPERTENSION: ICD-10-CM

## 2023-12-26 DIAGNOSIS — Z12.11 SCREEN FOR COLON CANCER: ICD-10-CM

## 2023-12-26 DIAGNOSIS — H02.401 PTOSIS OF RIGHT EYELID: ICD-10-CM

## 2023-12-26 LAB
ALBUMIN SERPL BCG-MCNC: 4.4 G/DL (ref 3.5–5.2)
ALP SERPL-CCNC: 138 U/L (ref 40–150)
ALT SERPL W P-5'-P-CCNC: 36 U/L (ref 0–70)
ANION GAP SERPL CALCULATED.3IONS-SCNC: 12 MMOL/L (ref 7–15)
AST SERPL W P-5'-P-CCNC: 29 U/L (ref 0–45)
BILIRUB SERPL-MCNC: 0.4 MG/DL
BUN SERPL-MCNC: 15.8 MG/DL (ref 6–20)
CALCIUM SERPL-MCNC: 8.8 MG/DL (ref 8.6–10)
CHLORIDE SERPL-SCNC: 105 MMOL/L (ref 98–107)
CHOLEST SERPL-MCNC: 210 MG/DL
CREAT SERPL-MCNC: 1.19 MG/DL (ref 0.67–1.17)
CREAT UR-MCNC: 118 MG/DL
DEPRECATED HCO3 PLAS-SCNC: 23 MMOL/L (ref 22–29)
EGFRCR SERPLBLD CKD-EPI 2021: 74 ML/MIN/1.73M2
ERYTHROCYTE [DISTWIDTH] IN BLOOD BY AUTOMATED COUNT: 12.1 % (ref 10–15)
FASTING STATUS PATIENT QL REPORTED: YES
GLUCOSE SERPL-MCNC: 122 MG/DL (ref 70–99)
HBA1C MFR BLD: 7.2 % (ref 0–5.6)
HCT VFR BLD AUTO: 44.1 % (ref 40–53)
HDLC SERPL-MCNC: 38 MG/DL
HGB BLD-MCNC: 14.9 G/DL (ref 13.3–17.7)
LDLC SERPL CALC-MCNC: 109 MG/DL
MCH RBC QN AUTO: 29.4 PG (ref 26.5–33)
MCHC RBC AUTO-ENTMCNC: 33.8 G/DL (ref 31.5–36.5)
MCV RBC AUTO: 87 FL (ref 78–100)
MICROALBUMIN UR-MCNC: 741 MG/L
MICROALBUMIN/CREAT UR: 627.97 MG/G CR (ref 0–17)
NONHDLC SERPL-MCNC: 172 MG/DL
PLATELET # BLD AUTO: 275 10E3/UL (ref 150–450)
POTASSIUM SERPL-SCNC: 3.4 MMOL/L (ref 3.4–5.3)
PROT SERPL-MCNC: 7.4 G/DL (ref 6.4–8.3)
PSA SERPL DL<=0.01 NG/ML-MCNC: 1.64 NG/ML (ref 0–3.5)
RBC # BLD AUTO: 5.06 10E6/UL (ref 4.4–5.9)
SODIUM SERPL-SCNC: 140 MMOL/L (ref 135–145)
TRIGL SERPL-MCNC: 314 MG/DL
WBC # BLD AUTO: 5.9 10E3/UL (ref 4–11)

## 2023-12-26 PROCEDURE — 85027 COMPLETE CBC AUTOMATED: CPT | Performed by: FAMILY MEDICINE

## 2023-12-26 PROCEDURE — 82570 ASSAY OF URINE CREATININE: CPT | Performed by: FAMILY MEDICINE

## 2023-12-26 PROCEDURE — 36415 COLL VENOUS BLD VENIPUNCTURE: CPT | Performed by: FAMILY MEDICINE

## 2023-12-26 PROCEDURE — G0103 PSA SCREENING: HCPCS | Performed by: FAMILY MEDICINE

## 2023-12-26 PROCEDURE — 80053 COMPREHEN METABOLIC PANEL: CPT | Performed by: FAMILY MEDICINE

## 2023-12-26 PROCEDURE — 99214 OFFICE O/P EST MOD 30 MIN: CPT | Performed by: FAMILY MEDICINE

## 2023-12-26 PROCEDURE — 82043 UR ALBUMIN QUANTITATIVE: CPT | Performed by: FAMILY MEDICINE

## 2023-12-26 PROCEDURE — 80061 LIPID PANEL: CPT | Performed by: FAMILY MEDICINE

## 2023-12-26 PROCEDURE — 83036 HEMOGLOBIN GLYCOSYLATED A1C: CPT | Performed by: FAMILY MEDICINE

## 2023-12-26 RX ORDER — ATORVASTATIN CALCIUM 20 MG/1
20 TABLET, FILM COATED ORAL DAILY
Qty: 90 TABLET | Refills: 0 | Status: SHIPPED | OUTPATIENT
Start: 2023-12-26 | End: 2024-03-25

## 2023-12-26 RX ORDER — AMLODIPINE BESYLATE 10 MG/1
10 TABLET ORAL DAILY
Qty: 90 TABLET | Refills: 0 | Status: SHIPPED | OUTPATIENT
Start: 2023-12-26 | End: 2024-03-25

## 2023-12-26 RX ORDER — LOSARTAN POTASSIUM 100 MG/1
100 TABLET ORAL DAILY
Qty: 90 TABLET | Refills: 0 | Status: SHIPPED | OUTPATIENT
Start: 2023-12-26 | End: 2024-04-17

## 2023-12-26 RX ORDER — METFORMIN HCL 500 MG
1000 TABLET, EXTENDED RELEASE 24 HR ORAL
Qty: 180 TABLET | Refills: 3 | Status: SHIPPED | OUTPATIENT
Start: 2023-12-26 | End: 2024-03-25

## 2023-12-26 ASSESSMENT — PAIN SCALES - GENERAL: PAINLEVEL: NO PAIN (0)

## 2023-12-26 NOTE — PROGRESS NOTES
[address] [address]  Phone: [phone]  Primary Provider: Henry Washington  Pre-op Performing Provider: HENRY WASHINGTON      PREOPERATIVE EVALUATION:  Today's date: 12/26/2023    Efrain is a 48 year old, presenting for the following:  Pre-Op Exam        Surgical Information:  Surgery/Procedure: Right Lower Eyelid Ectropion repair/ Right upper eyelid ptosis repair  [place] [facility] [place]geon: YRN MURRAY  Surgery Date:1/4/2024  Time of Surgery: 9:20  Where patient plans to recover: At home with family  Fax number for surgical facility: Note does not need to be faxed, will be available electronically in Epic.    Assessment & Plan     The proposed surgical procedure is considered INTERMEDIATE risk.    Preop general physical exam    - CBC with platelets; Future  - CBC with platelets    Ptosis of right eyelid      Screen for colon cancer    - Fecal colorectal cancer screen (FIT); Future  - Fecal colorectal cancer screen (FIT)    Benign essential hypertension  Well-managed.  - amLODIPine (NORVASC) 10 MG tablet; Take 1 tablet (10 mg) by mouth daily  - losartan (COZAAR) 100 MG tablet; Take 1 tablet (100 mg) by mouth daily  - Comprehensive metabolic panel; Future  - Comprehensive metabolic panel    Hyperlipidemia LDL goal <100  Continue use of atorvastatin 20 mg daily.  LDL is 109.  Would like to check that again in 3 months once his diabetic control improves.  For now recommending to be compliant with medication use  - Lipid panel reflex to direct LDL Non-fasting; Future  - atorvastatin (LIPITOR) 20 MG tablet; Take 1 tablet (20 mg) by mouth daily  - Comprehensive metabolic panel; Future  - Lipid panel reflex to direct LDL Non-fasting  - Comprehensive metabolic panel    Type 2 diabetes, HbA1c goal < 7% (H)  Diabetic control has worsened as compared to before.  Hemoglobin A1c 7.2 now as compared to 6.5 earlier this year.  Patient is being  noncompliant to medication use.  Recommending to switch from regular metformin twice a day to metformin extended release 1000 mg once a day..  Follow-up in 3 months for recheck  - Albumin Random Urine Quantitative with Creat Ratio; Future  - Hemoglobin A1c; Future  - metFORMIN (GLUCOPHAGE XR) 500 MG 24 hr tablet; Take 2 tablets (1,000 mg) by mouth daily (with dinner)  - Albumin Random Urine Quantitative with Creat Ratio  - Hemoglobin A1c    Screening for prostate cancer    - Prostate Specific Antigen Screen; Future  - Prostate Specific Antigen Screen          RECOMMENDATION:  APPROVAL GIVEN to proceed with proposed procedure, without further diagnostic evaluation.            Subjective       HPI related to upcoming procedure:         12/26/2023     8:36 AM   Preop Questions   1. Have you ever had a heart attack or stroke? No   2. Have you ever had surgery on your heart or blood vessels, such as a stent placement, a coronary artery bypass, or surgery on an artery in your head, neck, heart, or legs? No   3. Do you have chest pain with activity? No   4. Do you have a history of  heart failure? No   5. Do you currently have a cold, bronchitis or symptoms of other infection? No   6. Do you have a cough, shortness of breath, or wheezing? No   7. Do you or anyone in your family have previous history of blood clots? No   8. Do you or does anyone in your family have a serious bleeding problem such as prolonged bleeding following surgeries or cuts? No   9. Have you ever had problems with anemia or been told to take iron pills? No   10. Have you had any abnormal blood loss such as black, tarry or bloody stools? No   11. Have you ever had a blood transfusion? No   12. Are you willing to have a blood transfusion if it is medically needed before, during, or after your surgery? Yes   13. Have you or any of your relatives ever had problems with anesthesia? No   14. Do you have sleep apnea, excessive snoring or daytime drowsiness?  YES -    14a. Do you have a CPAP machine? Yes   15. Do you have any artifical heart valves or other implanted medical devices like a pacemaker, defibrillator, or continuous glucose monitor? No   16. Do you have artificial joints? No   17. Are you allergic to latex? No       Patient history of type 2 diabetes.  He is currently taking metformin 500 mg twice daily.  He tells that he often forgets to take evening dose.  He stopped using Ozempic as her daughter was worried that he may be just using it for weight loss management.    Patient has dyslipidemia for which he is taking a statin.          Review of Systems  CONSTITUTIONAL: NEGATIVE for fever, chills, change in weight  ENT/MOUTH: NEGATIVE for ear, mouth and throat problems  RESP: NEGATIVE for significant cough or SOB  CV: NEGATIVE for chest pain, palpitations or peripheral edema    Patient Active Problem List    Diagnosis Date Noted    Dry eye syndrome of both eyes 11/07/2023     Priority: Medium    Dermatochalasis of both upper eyelids 11/07/2023     Priority: Medium    Perforated appendicitis 03/23/2023     Priority: Medium    GIULIA (obstructive sleep apnea) 09/14/2021     Priority: Medium     Severe GIULIA      New onset type 2 diabetes mellitus (H) 08/23/2021     Priority: Medium    Hypokalemia 08/23/2021     Priority: Medium    Floppy eyelid syndrome of right eye 10/27/2020     Priority: Medium     Added automatically from request for surgery [id number]      Senile ectropion of right lower eyelid 10/27/2020     Priority: Medium     Added automatically from request for surgery [id number]      Mechanical entropion of right eye 10/27/2020     Priority: Medium     Added automatically from request for surgery [id number]      Corneal scar 10/27/2020     Priority: Medium     Added automatically from request for surgery [id number]      Left shoulder pain 06/26/2018     Priority: Medium    Morbid obesity (H) 06/25/2018     Priority: Medium    Family history of prostate cancer  06/25/2018     Priority: Medium    Microalbuminuria 06/25/2018     Priority: Medium    Hyperlipidemia LDL goal <100 06/25/2018     Priority: Medium    Tobacco abuse 07/12/2012     Priority: Medium    Essential hypertension, benign 07/12/2012     Priority: Medium      Past Medical History:   Diagnosis Date    Diabetes (H)     HTN (hypertension)     Microalbuminuria 10/30/2013    Sleep apnea     Tobacco abuse 07/12/2012     Past Surgical History:   Procedure Laterality Date    LAPAROSCOPIC APPENDECTOMY N/A 3/23/2023    Procedure: APPENDECTOMY, LAPAROSCOPIC;  Surgeon: Nabeel Zabala MD;  Location:  OR    REPAIR ECTROPION Right 12/3/2020    Procedure: Right lower eyelid ectropion repair;  Surgeon: Oanh Donnelly MD;  [place] [facility] [place]    [place]PAIR PTOSIS      SURGICAL HISTORY OF -       rt knee surgery    WEDGE RESECTION EYELID Right 12/3/2020    Procedure: Right upper eyelid pentagonal wedge;  Surgeon: Oanh Donnelly MD;  [place] [facility] [place]     Current Outpatient Medications   Medication Sig Dispense Refill    amLODIPine (NORVASC) 10 MG tablet Take 1 tablet (10 mg) by mouth daily 90 tablet 0    atorvastatin (LIPITOR) 20 MG tablet Take 1 tablet (20 mg) by mouth daily 90 tablet 0    carboxymethylcellulose PF (REFRESH LIQUIGEL) 1 % ophthalmic gel Place 1 drop into the right eye 8 times daily 120 each 11    fluorometholone (FML LIQUIFILM) 0.1 % ophthalmic suspension Place 1 drop into the right eye daily 10 mL 4    hydrocortisone 2.5 % ointment APPLY TOPICALLY TO THE AFFECTED AREA TWICE DAILY 28.35 g 0    ketoconazole (NIZORAL) 2 % external shampoo Use every 1-2 days when flared. Leave in few minutes before rinsing. Use twice weekly to prevent flares. 120 mL 11    losartan (COZAAR) 100 MG tablet Take 1 tablet (100 mg) by mouth daily 90 tablet 0    loteprednol (LOTEMAX) 0.5 % ophthalmic suspension Place 1-2 drops into the right eye 4 times daily 10 mL 3    metFORMIN (GLUCOPHAGE XR) 500 MG 24 hr tablet Take 2  "tablets (1,000 mg) by mouth daily (with dinner) 180 tablet 3    metFORMIN (GLUCOPHAGE) 500 MG tablet TAKE 1 TABLET(500 MG) BY MOUTH TWICE DAILY WITH MEALS 180 tablet 0    Multiple Vitamins-Minerals (MULTIVITAMIN ADULT PO) Take 1 tablet by mouth daily      White Petrolatum-Mineral Oil (REFRESH P.M.) OINT Apply 1/2 inch strip of ointment in Right eye at bedtime. 1 tube with 12 refills. 3.5 g 11       Allergies   Allergen Reactions    Lisinopril Cough        Social History     Tobacco Use    Smoking status: Every Day     Packs/day: .3     Types: Cigarettes    Smokeless tobacco: Never   Substance Use Topics    Alcohol use: No     Alcohol/week: 0.0 standard drinks of alcohol       History   Drug Use No         Objective     /74   Pulse 80   Temp 97.4  F (36.3  C) (Temporal)   Resp 18   Ht 1.651 m (5' 5\")   Wt 107.5 kg (237 lb)   SpO2 98%   BMI 39.44 kg/m      Physical Exam  GENERAL APPEARANCE: healthy, alert and no distress  HENT: ear canals and TM's normal and nose and mouth without ulcers or lesions  RESP: lungs clear to auscultation - no rales, rhonchi or wheezes  CV: regular rate and rhythm, normal S1 S2, no S3 or S4 and no murmur, click or rub   ABDOMEN: soft, nontender, no HSM or masses and bowel sounds normal  NEURO: Normal strength and tone, sensory exam grossly normal, mentation intact and speech normal    Recent Labs   Lab Test 04/01/23  1222 03/31/23  0528 03/30/23  0729 03/23/23  1319 01/04/23  1102 10/19/22  0945   HGB 13.7 13.2* 14.0   < >  --   --    * 457* 447   < >  --   --    NA  --  137 138   < > 141 139   POTASSIUM 3.6 3.6 3.7   < > 3.5 3.5   CR  --  1.19* 1.22*   < > 1.29* 1.18   A1C  --   --   --   --  6.5* 9.2*    < > = values in this interval not displayed.        Diagnostics:  Results for orders placed or performed in visit on 12/26/23   Lipid panel reflex to direct LDL Non-fasting     Status: Abnormal   Result Value Ref Range    Cholesterol 210 (H) <200 mg/dL    Triglycerides " 314 (H) <150 mg/dL    Direct Measure HDL 38 (L) >=40 mg/dL    LDL Cholesterol Calculated 109 (H) <=100 mg/dL    Non HDL Cholesterol 172 (H) <130 mg/dL    Patient Fasting > 8hrs? Yes     Narrative    Cholesterol  Desirable:  <200 mg/dL    Triglycerides  Normal:  Less than 150 mg/dL  Borderline High:  150-199 mg/dL  High:  200-499 mg/dL  Very High:  Greater than or equal to 500 mg/dL    Direct Measure HDL  Female:  Greater than or equal to 50 mg/dL   Male:  Greater than or equal to 40 mg/dL    LDL Cholesterol  Desirable:  <100mg/dL  Above Desirable:  100-129 mg/dL   Borderline High:  130-159 mg/dL   High:  160-189 mg/dL   Very High:  >= 190 mg/dL    Non HDL Cholesterol  Desirable:  130 mg/dL  Above Desirable:  130-159 mg/dL  Borderline High:  160-189 mg/dL  High:  190-219 mg/dL  Very High:  Greater than or equal to 220 mg/dL   Hemoglobin A1c     Status: Abnormal   Result Value Ref Range    Hemoglobin A1C 7.2 (H) 0.0 - 5.6 %   Comprehensive metabolic panel     Status: Abnormal   Result Value Ref Range    Sodium 140 135 - 145 mmol/L    Potassium 3.4 3.4 - 5.3 mmol/L    Carbon Dioxide (CO2) 23 22 - 29 mmol/L    Anion Gap 12 7 - 15 mmol/L    Urea Nitrogen 15.8 6.0 - 20.0 mg/dL    Creatinine 1.19 (H) 0.67 - 1.17 mg/dL    GFR Estimate 74 >60 mL/min/1.73m2    Calcium 8.8 8.6 - 10.0 mg/dL    Chloride 105 98 - 107 mmol/L    Glucose 122 (H) 70 - 99 mg/dL    Alkaline Phosphatase 138 40 - 150 U/L    AST 29 0 - 45 U/L    ALT 36 0 - 70 U/L    Protein Total 7.4 6.4 - 8.3 g/dL    Albumin 4.4 3.5 - 5.2 g/dL    Bilirubin Total 0.4 <=1.2 mg/dL   Prostate Specific Antigen Screen     Status: Normal   Result Value Ref Range    Prostate Specific Antigen Screen 1.64 0.00 - 3.50 ng/mL    Narrative    This result is obtained using the Roche Elecsys total PSA method on the neyda [id number] immunoassay analyzer. Results obtained with different assay methods or kits cannot be used interchangeably.   CBC with platelets     Status: Normal   Result Value  Ref Range    WBC Count 5.9 4.0 - 11.0 10e3/uL    RBC Count 5.06 4.40 - 5.90 10e6/uL    Hemoglobin 14.9 13.3 - 17.7 g/dL    Hematocrit 44.1 40.0 - 53.0 %    MCV 87 78 - 100 fL    MCH 29.4 26.5 - 33.0 pg    MCHC 33.8 31.5 - 36.5 g/dL    RDW 12.1 10.0 - 15.0 %    Platelet Count 275 150 - 450 10e3/uL            Revised Cardiac Risk Index (RCRI):  The patient has the following serious cardiovascular risks for perioperative complications:   - No serious cardiac risks = 0 points     RCRI Interpretation: 0 points: Class I (very low risk - 0.4% complication rate)         Signed Electronically by: Henry Cesar MD  Copy of this evaluation report is provided to requesting physician.

## 2024-01-02 ENCOUNTER — ANESTHESIA EVENT (OUTPATIENT)
Dept: SURGERY | Facility: AMBULATORY SURGERY CENTER | Age: 49
End: 2024-01-02
Payer: COMMERCIAL

## 2024-01-04 ENCOUNTER — ANESTHESIA (OUTPATIENT)
Dept: SURGERY | Facility: AMBULATORY SURGERY CENTER | Age: 49
End: 2024-01-04
Payer: COMMERCIAL

## 2024-01-04 ENCOUNTER — HOSPITAL ENCOUNTER (OUTPATIENT)
Facility: AMBULATORY SURGERY CENTER | Age: 49
Discharge: HOME | End: 2024-01-04
Attending: OPHTHALMOLOGY
Payer: COMMERCIAL

## 2024-01-04 VITALS
DIASTOLIC BLOOD PRESSURE: 69 MMHG | BODY MASS INDEX: 39.49 KG/M2 | RESPIRATION RATE: 16 BRPM | HEART RATE: 83 BPM | OXYGEN SATURATION: 98 % | SYSTOLIC BLOOD PRESSURE: 121 MMHG | WEIGHT: 237 LBS | HEIGHT: 65 IN | TEMPERATURE: 97.4 F

## 2024-01-04 DIAGNOSIS — H02.831 DERMATOCHALASIS OF BOTH UPPER EYELIDS: Primary | ICD-10-CM

## 2024-01-04 DIAGNOSIS — H02.834 DERMATOCHALASIS OF BOTH UPPER EYELIDS: Primary | ICD-10-CM

## 2024-01-04 LAB
GLUCOSE BLDC GLUCOMTR-MCNC: 123 MG/DL (ref 70–99)
GLUCOSE BLDC GLUCOMTR-MCNC: 125 MG/DL (ref 70–99)

## 2024-01-04 PROCEDURE — 67961 REVISION OF EYELID: CPT | Mod: E3

## 2024-01-04 PROCEDURE — 67904 REPAIR EYELID DEFECT: CPT | Mod: RT

## 2024-01-04 PROCEDURE — 67966 REVISION OF EYELID: CPT | Mod: E3 | Performed by: OPHTHALMOLOGY

## 2024-01-04 PROCEDURE — 82962 GLUCOSE BLOOD TEST: CPT | Performed by: PATHOLOGY

## 2024-01-04 PROCEDURE — 67904 REPAIR EYELID DEFECT: CPT | Mod: RT | Performed by: OPHTHALMOLOGY

## 2024-01-04 PROCEDURE — 67917 REPAIR EYELID DEFECT: CPT | Mod: 59 | Performed by: OPHTHALMOLOGY

## 2024-01-04 PROCEDURE — 67917 REPAIR EYELID DEFECT: CPT | Mod: E4

## 2024-01-04 RX ORDER — NEOMYCIN POLYMYXIN B SULFATES AND DEXAMETHASONE 3.5; 10000; 1 MG/ML; [USP'U]/ML; MG/ML
SUSPENSION/ DROPS OPHTHALMIC
Qty: 5 ML | Refills: 0 | Status: SHIPPED | OUTPATIENT
Start: 2024-01-04 | End: 2024-01-18

## 2024-01-04 RX ORDER — OXYCODONE HYDROCHLORIDE 5 MG/1
10 TABLET ORAL
Status: DISCONTINUED | OUTPATIENT
Start: 2024-01-04 | End: 2024-01-05 | Stop reason: HOSPADM

## 2024-01-04 RX ORDER — ONDANSETRON 2 MG/ML
4 INJECTION INTRAMUSCULAR; INTRAVENOUS EVERY 30 MIN PRN
Status: DISCONTINUED | OUTPATIENT
Start: 2024-01-04 | End: 2024-01-05 | Stop reason: HOSPADM

## 2024-01-04 RX ORDER — OXYCODONE HYDROCHLORIDE 5 MG/1
5 TABLET ORAL
Status: DISCONTINUED | OUTPATIENT
Start: 2024-01-04 | End: 2024-01-05 | Stop reason: HOSPADM

## 2024-01-04 RX ORDER — SODIUM CHLORIDE, SODIUM LACTATE, POTASSIUM CHLORIDE, CALCIUM CHLORIDE 600; 310; 30; 20 MG/100ML; MG/100ML; MG/100ML; MG/100ML
INJECTION, SOLUTION INTRAVENOUS CONTINUOUS
Status: DISCONTINUED | OUTPATIENT
Start: 2024-01-04 | End: 2024-01-05 | Stop reason: HOSPADM

## 2024-01-04 RX ORDER — TETRACAINE HYDROCHLORIDE 5 MG/ML
SOLUTION OPHTHALMIC PRN
Status: DISCONTINUED | OUTPATIENT
Start: 2024-01-04 | End: 2024-01-04 | Stop reason: HOSPADM

## 2024-01-04 RX ORDER — ONDANSETRON 4 MG/1
4 TABLET, ORALLY DISINTEGRATING ORAL EVERY 30 MIN PRN
Status: DISCONTINUED | OUTPATIENT
Start: 2024-01-04 | End: 2024-01-05 | Stop reason: HOSPADM

## 2024-01-04 RX ORDER — LIDOCAINE 40 MG/G
CREAM TOPICAL
Status: DISCONTINUED | OUTPATIENT
Start: 2024-01-04 | End: 2024-01-05 | Stop reason: HOSPADM

## 2024-01-04 RX ORDER — SODIUM CHLORIDE, SODIUM LACTATE, POTASSIUM CHLORIDE, CALCIUM CHLORIDE 600; 310; 30; 20 MG/100ML; MG/100ML; MG/100ML; MG/100ML
INJECTION, SOLUTION INTRAVENOUS CONTINUOUS PRN
Status: DISCONTINUED | OUTPATIENT
Start: 2024-01-04 | End: 2024-01-04

## 2024-01-04 RX ORDER — ACETAMINOPHEN 325 MG/1
975 TABLET ORAL ONCE
Status: COMPLETED | OUTPATIENT
Start: 2024-01-04 | End: 2024-01-04

## 2024-01-04 RX ORDER — ERYTHROMYCIN 5 MG/G
OINTMENT OPHTHALMIC PRN
Status: DISCONTINUED | OUTPATIENT
Start: 2024-01-04 | End: 2024-01-04 | Stop reason: HOSPADM

## 2024-01-04 RX ORDER — LIDOCAINE HYDROCHLORIDE 20 MG/ML
INJECTION, SOLUTION INFILTRATION; PERINEURAL PRN
Status: DISCONTINUED | OUTPATIENT
Start: 2024-01-04 | End: 2024-01-04

## 2024-01-04 RX ORDER — PROPOFOL 10 MG/ML
INJECTION, EMULSION INTRAVENOUS PRN
Status: DISCONTINUED | OUTPATIENT
Start: 2024-01-04 | End: 2024-01-04

## 2024-01-04 RX ORDER — ERYTHROMYCIN 5 MG/G
OINTMENT OPHTHALMIC
Qty: 3.5 G | Refills: 0 | Status: SHIPPED | OUTPATIENT
Start: 2024-01-04 | End: 2024-03-25

## 2024-01-04 RX ADMIN — SODIUM CHLORIDE, SODIUM LACTATE, POTASSIUM CHLORIDE, CALCIUM CHLORIDE: 600; 310; 30; 20 INJECTION, SOLUTION INTRAVENOUS at 09:25

## 2024-01-04 RX ADMIN — SODIUM CHLORIDE, SODIUM LACTATE, POTASSIUM CHLORIDE, CALCIUM CHLORIDE: 600; 310; 30; 20 INJECTION, SOLUTION INTRAVENOUS at 08:31

## 2024-01-04 RX ADMIN — LIDOCAINE HYDROCHLORIDE 80 MG: 20 INJECTION, SOLUTION INFILTRATION; PERINEURAL at 09:32

## 2024-01-04 RX ADMIN — PROPOFOL 100 MG: 10 INJECTION, EMULSION INTRAVENOUS at 09:32

## 2024-01-04 ASSESSMENT — COPD QUESTIONNAIRES: COPD: 0

## 2024-01-04 ASSESSMENT — LIFESTYLE VARIABLES: TOBACCO_USE: 1

## 2024-01-04 NOTE — ANESTHESIA POSTPROCEDURE EVALUATION
Patient: Efrain Lock    Procedure: Procedure(s):  Right lower eyelid ectropion repair  Right upper eyelid ptosis repair, Right Upper lid pentagonal wedge ressection       Anesthesia Type:  MAC    Note:  Disposition: Outpatient   Postop Pain Control: Uneventful            Sign Out: Well controlled pain   PONV: No   Neuro/Psych: Uneventful            Sign Out: Acceptable/Baseline neuro status   Airway/Respiratory: Uneventful            Sign Out: Acceptable/Baseline resp. status   CV/Hemodynamics: Uneventful            Sign Out: Acceptable CV status; No obvious hypovolemia; No obvious fluid overload   Other NRE: NONE   DID A NON-ROUTINE EVENT OCCUR?            Last vitals:  Vitals Value Taken Time   /69 01/04/24 1045   Temp 36.3  C (97.4  F) 01/04/24 1045   Pulse 83 01/04/24 1045   Resp 16 01/04/24 1045   SpO2 98 % 01/04/24 1045       Electronically Signed By: Yovani Vides MD  January 4, 2024  1:10 PM

## 2024-01-04 NOTE — BRIEF OP NOTE
[facility]    Brief Operative Note    Pre-operative diagnosis: Dry eye syndrome of both eyes [H04.123]  Floppy eyelid syndrome of right eye [H02.59]  Dermatochalasis of both upper eyelids [H02.831, H02.834]  Senile ectropion of right lower eyelid [H02.132]  Post-operative diagnosis Same as pre-operative diagnosis    Procedure: Right lower eyelid ectropion repair, Right - Eye  Right upper eyelid ptosis repair, Right Upper lid pentagonal wedge ressection, Right - Eye    Surgeon: Surgeon(s) and Role:     * Oanh Donnelly MD - Primary     * Sabrina Cuello MD - Resident - Assisting     * Abel Mckenzie MD - Resident - Observing  Anesthesia: MAC with Local   Estimated Blood Loss: Minimal    Drains: None  Specimens: * No specimens in log *  Findings:   As expected .  Complications: None.  Implants: * No implants in log *

## 2024-01-04 NOTE — DISCHARGE INSTRUCTIONS
Post-operative Instructions  Ophthalmic Plastic and Reconstructive Surgery    Oanh Donnelly M.D.     All instructions apply to the operated eye(s) or eyelid(s).    Wound care and personal care  ? Apply ice compresses and gentle pressure 15 minutes on, 15 minutes off, for 2 days. If you are sleeping, you don't need to wake up to ice. As long as there is no further bleeding, after two days, switch to warm water compresses for five minutes, four times a day until seen by your physician.   ? You may shower or wash your hair the day after surgery. Do not go swimming for at least 2 weeks to prevent contamination of your wounds.  ? You may go for walks and light activity is ok, but no heavy (over 15 pounds) lifting, bending or excessive straining for one week.   ? Do not apply make-up to the eyes or eyelids for 2 weeks after surgery.  ? Expect some swelling, bruising, black eye (even into the lower eyelids and cheeks). Also expect serum caking, crusting and discharge from the eye and/or incisions. A small amount of surface bleeding, and depending on the type of surgery, bleeding from the inside of the eyelid, is normal for the first 48 hours.  ? Avoid straining, bending at the waist, or lifting more than 15 pounds for 1 week. Sleeping with your head elevated, such as in a recliner, for the first several days can help swelling resolve more quickly.   ? Do continue to ambulate (walk) as you normally would - being sedentary after surgery can cause blood clots.   ? Your eye(s) and eyelid(s) may be painful and tender. This is normal after surgery.      Contact information and follow-up  ? Return to the Eye Clinic for a follow-up appointment with your physician as scheduled. If no appointment has been scheduled:   - [phone] for an appointment with Dr. Donnelly within 2 to 3 weeks from your date of surgery.     ? For severe pain, bleeding, or loss of vision, call the [facility] at [phone].    After hours or on weekends and holidays, call [phone] and ask to speak with the ophthalmologist on call.    An on call person can be reached after hours for concerns. The on call doctor should not call in medication refill requests after hours or on weekends, so please plan accordingly. An effort has been made to provide adequate pain medications following every surgery, and refills will not be provided in most instances.     Medications  ? Restart all regular home medications and eye drops. If you take Plavix or Aspirin on a regular basis, wait for 72 hours after your surgery before restarting these in order to decrease the risk of bleeding complications.  ? Avoid aspirin and aspirin-like medications (Motrin, Aleve, Ibuprofen, [place] etc) for 72 hours to reduce the risk of bleeding. You may take Tylenol (acetaminophen) for pain.  ? In addition to your home medications, take the following post-operative medications as prescribed by your physician.    ? Apply antibiotic ointment to all sutures three times a day, and into the operated eye(s) at night.   Once you run out, you can apply Vaseline or Aquaphor (over the counter) to the incisions. Don't put the Vaseline or Aquaphor into your eyes.   ? Instill prescribed eye drops 3 times a day for 10 days.   ? If you have ocular irritation, you can use over the counter artificial tears such as Refresh, Systane, or Blink. Do not use Visine, Clear Eyes, or any other drop that gets the red out.           [facility]  Home Care Following Anesthesia  For 24 hours after surgery:  Get plenty of rest.  A responsible adult must stay with you for at least 24 hours after you leave the surgery center.  Do not drive or use heavy equipment.  If you have weakness or tingling, don't drive or use heavy equipment until this feeling goes away.   Do not drink alcohol.   Avoid strenuous or risky activities.  Ask for help when climbing  stairs.  You may feel lightheaded.  IF so, sit for a few minutes before standing.  Have someone help you get up.   If you have nausea (feel sick to your stomach): Drink only clear liquids such as apple juice, ginger ale, broth or 7-Up.  Rest may also help.  Be sure to drink enough fluids.  Move to a regular diet as you feel able.   You may have a slight fever.  Call the doctor if your fever is over 100 F (37.7 C) (taken under the tongue) or lasts longer than 24 hours.  You may have a dry mouth, a sore throat, muscle aches or trouble sleeping. These should go away after 24 hours.  Do not make important or legal decisions.   It is recommended to avoid smoking.               Tips for taking pain medications  To get the best pain relief possible, remember these points:  Take pain medications as directed, before pain becomes severe.  Pain medication can upset your stomach: taking it with food may help.  Constipation is a common side effect of pain medication. Drink plenty of  fluids.  Eat foods high in fiber. Take a stool softener if recommended by your doctor or pharmacist.  Do not drink alcohol, drive or operate machinery while taking pain medications.  Ask about other ways to control pain, such as with heat, ice or relaxation.    Tylenol/Acetaminophen Consumption    If you feel your pain relief is insufficient, you may take Tylenol/Acetaminophen in addition to your narcotic pain medication.   Be careful not to exceed 4,000 mg of Tylenol/Acetaminophen in a 24 hour period from all sources.  If you are taking extra strength Tylenol/acetaminophen (500 mg), the maximum dose is 8 tablets in 24 hours.  If you are taking regular strength acetaminophen (325 mg), the maximum dose is 12 tablets in 24 hours.    Call a doctor for any of the following:  Signs of infection (fever, growing tenderness at the surgery site, a large amount of drainage or bleeding, severe pain, foul-smelling drainage, redness, swelling).  It has been over  8 to 10 hours since surgery and you are still not able to urinate (pass water).  Headache for over 24 hours.  Numbness, tingling or weakness the day after surgery (if you had spinal anesthesia).  Signs of Covid-19 infection (temperature over 100 degrees, shortness of breath, cough, loss of taste/smell, generalized body aches, persistent headache, chills, sore throat, nausea/vomiting/diarrhea)  Your doctor is:       Dr. Oanh Donnelly, Ophthalmology: [phone]               Or dial [phone] and ask for the resident on call for:  Ophthalmology  For emergency care, call the:  [facility]:  [phone] (TTY for hearing impaired: [phone])

## 2024-01-04 NOTE — ANESTHESIA CARE TRANSFER NOTE
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    Anesthesia Type:   MAC     Note:      Level of Consciousness: awake  Oxygen Supplementation: room air    Independent Airway: airway patency satisfactory and stable        Patient transferred to: Phase II    Handoff Report: Identifed the Patient, Identified the Reponsible Provider, Reviewed the pertinent medical history, Discussed the surgical course, Reviewed Intra-OP anesthesia mangement and issues during anesthesia, Set expectations for post-procedure period and Allowed opportunity for questions and acknowledgement of understanding      Vitals:  Vitals Value Taken Time   BP     Temp 36.4  C (97.6  F) 01/04/24 1017   Pulse 74 01/04/24 1017   Resp 16 01/04/24 1017   SpO2 97 % 01/04/24 1017       Electronically Signed By: MIKEL Avila CRNA  January 4, 2024  10:19 AM

## 2024-01-04 NOTE — ANESTHESIA PREPROCEDURE EVALUATION
Anesthesia Pre-Procedure Evaluation    Patient: Efrain Lock   MRN: [id number] : 1975        Procedure : Procedure(s):  Right lower eyelid ectropion repair  Right upper eyelid ptosis repair          Past Medical History:   Diagnosis Date    Diabetes (H)     HTN (hypertension)     Microalbuminuria 10/30/2013    Sleep apnea     Tobacco abuse 2012      Past Surgical History:   Procedure Laterality Date    LAPAROSCOPIC APPENDECTOMY N/A 3/23/2023    Procedure: APPENDECTOMY, LAPAROSCOPIC;  Surgeon: Nabeel Zabala MD;  Location: [facility] OR    REPAIR ECTROPION Right 12/3/2020    Procedure: Right lower eyelid ectropion repair;  Surgeon: Oanh Donnelly MD;  Location: [facility] OR    REPAIR PTOSIS      SURGICAL HISTORY OF -       rt knee surgery    WEDGE RESECTION EYELID Right 12/3/2020    Procedure: Right upper eyelid pentagonal wedge;  Surgeon: Oanh Donnelly MD;  Location: [facility] OR      Allergies   Allergen Reactions    Lisinopril Cough      Social History     Tobacco Use    Smoking status: Every Day     Packs/day: .3     Types: Cigarettes    Smokeless tobacco: Never   Substance Use Topics    Alcohol use: No     Alcohol/week: 0.0 standard drinks of alcohol      Wt Readings from Last 1 Encounters:   24 107.5 kg (237 lb)        Anesthesia Evaluation   Pt has had prior anesthetic. Type: General.    No history of anesthetic complications       ROS/MED HX  ENT/Pulmonary:     (+) sleep apnea,               tobacco use, Current use,                    (-) asthma, COPD and vocal abnormalities   Neurologic:  - neg neurologic ROS     Cardiovascular:     (+)  hypertension- -   -  - -                                   (-) CAD   METS/Exercise Tolerance: >4 METS    Hematologic:  - neg hematologic  ROS     Musculoskeletal:       GI/Hepatic:    (-) GERD and liver disease   Renal/Genitourinary:    (-) renal disease   Endo:     (+)  type II DM,             Obesity,    (-) Type I DM   Psychiatric/Substance Use:      "  Infectious Disease:       Malignancy:       Other:            Physical Exam    Airway        Mallampati: II       Respiratory Devices and Support         Dental       (+) Minor Abnormalities - some fillings, tiny chips      Cardiovascular          Rhythm and rate: regular     Pulmonary           breath sounds clear to auscultation           OUTSIDE LABS:  CBC:   Lab Results   Component Value Date    WBC 5.9 12/26/2023    WBC 8.9 04/01/2023    HGB 14.9 12/26/2023    HGB 13.7 04/01/2023    HCT 44.1 12/26/2023    HCT 41.6 04/01/2023     12/26/2023     (H) 04/01/2023     BMP:   Lab Results   Component Value Date     12/26/2023     03/31/2023    POTASSIUM 3.4 12/26/2023    POTASSIUM 3.6 04/01/2023    CHLORIDE 105 12/26/2023    CHLORIDE 105 03/31/2023    CO2 23 12/26/2023    CO2 20 (L) 03/31/2023    BUN 15.8 12/26/2023    BUN 10.9 03/31/2023    CR 1.19 (H) 12/26/2023    CR 1.19 (H) 03/31/2023     (H) 12/26/2023    GLC 84 04/01/2023     COAGS: No results found for: \"PTT\", \"INR\", \"FIBR\"  POC: No results found for: \"BGM\", \"HCG\", \"HCGS\"  HEPATIC:   Lab Results   Component Value Date    ALBUMIN 4.4 12/26/2023    PROTTOTAL 7.4 12/26/2023    ALT 36 12/26/2023    AST 29 12/26/2023    ALKPHOS 138 12/26/2023    BILITOTAL 0.4 12/26/2023     OTHER:   Lab Results   Component Value Date    A1C 7.2 (H) 12/26/2023    SUMAYA 8.8 12/26/2023    LIPASE 37 03/23/2023    TSH 1.86 02/28/2020       Anesthesia Plan    ASA Status:  3    NPO Status:  NPO Appropriate    Anesthesia Type: MAC.     - Reason for MAC: immobility needed, straight local not clinically adequate              Consents    Anesthesia Plan(s) and associated risks, benefits, and realistic alternatives discussed. Questions answered and patient/representative(s) expressed understanding.     - Discussed:     - Discussed with:  Patient            Postoperative Care            Comments:               Yovani Vides MD    I have reviewed the " "pertinent notes and labs in the chart from the past 30 days and (re)examined the patient.  Any updates or changes from those notes are reflected in this note.              # DMII: A1C = 7.2 % (Ref range: 0.0 - 5.6 %) within past 6 months  # Obesity: Estimated body mass index is 39.44 kg/m  as calculated from the following:    Height as of this encounter: 1.651 m (5' 5\").    Weight as of this encounter: 107.5 kg (237 lb).      "

## 2024-01-04 NOTE — OP NOTE
Oculoplastic Surgery Operative Note    PREOPERATIVE DIAGNOSIS:    1.  Severe floppy eyelid syndrome right side  2.  Right lower eyelid ectropion  3.  Right upper eyelid blepharoptosis and entropion     POSTOPERATIVE DIAGNOSIS:    1.  Severe floppy eyelid syndrome right side  2.  Right lower eyelid ectropion  3.  Right upper eyelid blepharoptosis and entropion    PROCEDURE:   1.  Right lower eyelid ectropion repair  2.  Right upper eyelid blepharoptosis repair with levator advancement  3.  Right upper eyelid skin tag and a wedge about 30% of the right upper eyelid    ANESTHESIA: Monitored anesthesia care with local infiltration of 2% Lidocaine with epinephrine and 0.5% Marcaine in 50:50 mixture    SURGEON:  Oanh Donnelly MD    ASSISTANT: Sabrina Cuello MD     ESTIMATED BLOOD LOSS:  3mL    SPECIMEN: * No specimens in log *    INDICATIONS:  Efrain Lock presented with recurrent severe right floppy eyelid syndrome associated with sleep apnea.  This resulted in lower eyelid ectropion and upper eyelid blepharoptosis and entropion causing irritation to the eye and keratoconjunctivitis. Risks, benefits, and alternatives to the proposed procedure were discussed, and he elected to proceed.    PROCEDURE: Efrain Lock was brought to the operating room and placed supine on the operating table. Under monitored anesthesia care the upper eyelid excess skin was marked with marking pen and local anesthetic as above was infiltrated to the upper and lower eyelids on the right side.  He was prepped and draped in typical sterile fashion pocket plastic surgery.  Attention was directed to the right upper eyelid.  A 15 blade was used to incise the skin and skin and scar tissue was excised with cautery.  Orbicularis and orbital septum was opened levator aponeurosis was identified.  The leading edge of levator was excised with Jessica scissors.  6-0 Vicryl sutures were used to advance the levator aponeurosis and this was also advanced  to the inferior skin edge to provide eversion of the lash margin.  3 of the sutures were placed.  This was noted to cause loss of apposition of the lid to the globe due to his severe floppy eyelid syndrome thus decision was made to perform a tag and a wedge.  He did have a prior contact in [facility] as well.  Straight iris scissor was used to incise along the lateral upper eyelid vertically in a full-thickness fashion then the lid was overlapped and about a third of the lid was marked out for excision and a second vertical incision was made and these were connected.  The lid margin was reapproximated with 6-0 Vicryl suture.  Lash line was reapproximated 6-0 Vicryl suture.  Tarsal plate was reapproximated with lamellar bites with 5-0 Vicryl suture.  Attention was then directed to the right lower eyelid where an incision was made lateral to the lateral commissure with a 15 blade.  The lateral canthal tendon which is scar tissue as it is previously operated on was dissected out and imbricated with 5-0 Vicryl suture and this was carried to the lateral orbital rim periosteum with a 5-0 Vicryl suture.  The skin was closed with 6-0 plain gut suture. Efrain MABEL Mendozastaceyclark tolerated the procedure well and left the operating room in stable condition.       Oanh Donnelly MD   This report was dictated using Dragon voice recognition software.

## 2024-01-17 ENCOUNTER — TELEPHONE (OUTPATIENT)
Dept: NURSING | Facility: CLINIC | Age: 49
End: 2024-01-17
Payer: COMMERCIAL

## 2024-01-18 ENCOUNTER — OFFICE VISIT (OUTPATIENT)
Dept: OPHTHALMOLOGY | Facility: CLINIC | Age: 49
End: 2024-01-18
Payer: COMMERCIAL

## 2024-01-18 DIAGNOSIS — Z98.890 POSTOPERATIVE EYE STATE: ICD-10-CM

## 2024-01-18 DIAGNOSIS — H02.831 DERMATOCHALASIS OF BOTH UPPER EYELIDS: ICD-10-CM

## 2024-01-18 DIAGNOSIS — H02.132 SENILE ECTROPION OF RIGHT LOWER EYELID: Primary | ICD-10-CM

## 2024-01-18 DIAGNOSIS — H02.834 DERMATOCHALASIS OF BOTH UPPER EYELIDS: ICD-10-CM

## 2024-01-18 DIAGNOSIS — H02.59 FLOPPY EYELID SYNDROME OF RIGHT EYE: ICD-10-CM

## 2024-01-18 PROCEDURE — 99024 POSTOP FOLLOW-UP VISIT: CPT | Mod: GC | Performed by: OPHTHALMOLOGY

## 2024-01-18 ASSESSMENT — VISUAL ACUITY
OD_PH_SC+: +
OD_PH_SC: 20/25
OS_SC: 20/20
METHOD: SNELLEN - LINEAR
OD_SC: 20/30
OS_SC+: -2

## 2024-01-18 ASSESSMENT — CONF VISUAL FIELD
OD_NORMAL: 1
OD_SUPERIOR_NASAL_RESTRICTION: 0
OS_SUPERIOR_NASAL_RESTRICTION: 0
OS_INFERIOR_NASAL_RESTRICTION: 0
OS_NORMAL: 1
OS_SUPERIOR_TEMPORAL_RESTRICTION: 0
OD_SUPERIOR_TEMPORAL_RESTRICTION: 0
OD_INFERIOR_NASAL_RESTRICTION: 0
OS_INFERIOR_TEMPORAL_RESTRICTION: 0
OD_INFERIOR_TEMPORAL_RESTRICTION: 0

## 2024-01-18 ASSESSMENT — SLIT LAMP EXAM - LIDS: COMMENTS: NORMAL

## 2024-01-18 ASSESSMENT — TONOMETRY
OD_IOP_MMHG: 13
IOP_METHOD: ICARE
OS_IOP_MMHG: 17

## 2024-01-18 NOTE — PROGRESS NOTES
Chief Complaint(s) and History of Present Illness(es)     Follow Up For Surgery Of Eye            Laterality: right eye    Associated symptoms: Negative for redness, jaw claudication, photophobia   and foreign body sensation    Pain scale: 0/10          Comments    S/p regarding ectropion repair, skin tag removal and a wedge   01/04/2024.Patient states things are feeling fine and is compliant with   usage of ointment.       Becca IndyDANO January 18, 2024 9:23 AM                      Assessment & Plan     Efrain Lock is a 48 year old male with the following diagnoses:     ICD-10-CM    1. Senile ectropion of right lower eyelid  H02.132       2. Floppy eyelid syndrome of right eye  H02.59       3. Dermatochalasis of both upper eyelids  H02.831     H02.834       4. Postoperative eye state  Z98.890         S/p right eye ectropion repair, skin tag removal and a wedge   01/04/2024. Doing well, incisions healing, edema improving. Some sutures have come loose at home.     PLAN:  - Ok to use erythromycin ointment or Aquaphor on incisions.   - Return precautions  - Follow-up POM2, sooner if issues.           Patient disposition:   No follow-ups on file.       Abel Mckenzie MD  Ophthalmology Resident, PGY-4         Attending Physician Attestation: Complete documentation of historical and exam elements from today's encounter can be found in the full encounter summary report (not reduplicated in this progress note). I personally obtained the chief complaint(s) and history of present illness. I confirmed and edited as necessary the review of systems, past medical/surgical history, family history, social history, and examination findings as documented by others; and I examined the patient myself. I personally reviewed the relevant tests, images, and reports as documented above. I formulated and edited as necessary the assessment and plan and discussed the findings and management plan with the patient.  -Oanh  MD Andrzej

## 2024-01-18 NOTE — PATIENT INSTRUCTIONS
"- Apply warm compresses for 1 minute two to three times a day until your bruising has resolved. You can continue this for one more month.   - Apply erythromycin ophthalmic ointment to your incision three times a day and apply a 1/2\" strip into the right eye at bedtime. Once you run out you can apply Aquaphor or Vaseline to the incision at bedtime until it is smooth.  Do not get that into your eye.   - If you have symptoms of eye irritation, it is good to use over the counter artificial tears. Good brands include Refresh, Blink, and Systane. Do NOT get drops that are for \"red eyes.\"   - It is normal for the incision to appear raised, red, itch and have small lumps. You can gently massage any small bumps along the incision line. These can take up to six months to resolve.   "

## 2024-01-29 ENCOUNTER — OFFICE VISIT (OUTPATIENT)
Dept: SLEEP MEDICINE | Facility: CLINIC | Age: 49
End: 2024-01-29
Payer: COMMERCIAL

## 2024-01-29 VITALS
OXYGEN SATURATION: 98 % | HEIGHT: 65 IN | HEART RATE: 96 BPM | SYSTOLIC BLOOD PRESSURE: 150 MMHG | WEIGHT: 236.8 LBS | BODY MASS INDEX: 39.45 KG/M2 | DIASTOLIC BLOOD PRESSURE: 83 MMHG

## 2024-01-29 DIAGNOSIS — G47.33 OSA (OBSTRUCTIVE SLEEP APNEA): Primary | ICD-10-CM

## 2024-01-29 PROCEDURE — 99203 OFFICE O/P NEW LOW 30 MIN: CPT | Performed by: PSYCHIATRY & NEUROLOGY

## 2024-01-29 ASSESSMENT — SLEEP AND FATIGUE QUESTIONNAIRES
HOW LIKELY ARE YOU TO NOD OFF OR FALL ASLEEP WHILE SITTING AND TALKING TO SOMEONE: WOULD NEVER DOZE
HOW LIKELY ARE YOU TO NOD OFF OR FALL ASLEEP WHILE LYING DOWN TO REST IN THE AFTERNOON WHEN CIRCUMSTANCES PERMIT: SLIGHT CHANCE OF DOZING
HOW LIKELY ARE YOU TO NOD OFF OR FALL ASLEEP IN A CAR, WHILE STOPPED FOR A FEW MINUTES IN TRAFFIC: WOULD NEVER DOZE
HOW LIKELY ARE YOU TO NOD OFF OR FALL ASLEEP WHILE SITTING INACTIVE IN A PUBLIC PLACE: WOULD NEVER DOZE
HOW LIKELY ARE YOU TO NOD OFF OR FALL ASLEEP WHILE SITTING AND READING: WOULD NEVER DOZE
HOW LIKELY ARE YOU TO NOD OFF OR FALL ASLEEP WHILE WATCHING TV: SLIGHT CHANCE OF DOZING
HOW LIKELY ARE YOU TO NOD OFF OR FALL ASLEEP WHEN YOU ARE A PASSENGER IN A CAR FOR AN HOUR WITHOUT A BREAK: SLIGHT CHANCE OF DOZING
HOW LIKELY ARE YOU TO NOD OFF OR FALL ASLEEP WHILE SITTING QUIETLY AFTER LUNCH WITHOUT ALCOHOL: WOULD NEVER DOZE

## 2024-01-29 NOTE — NURSING NOTE
"Chief Complaint   Patient presents with    Sleep Problem     Needs order for new CPAP supplies       Initial BP (!) 150/83   Pulse 96   Ht 1.651 m (5' 5\")   Wt 107.4 kg (236 lb 12.8 oz)   SpO2 98%   BMI 39.41 kg/m   Estimated body mass index is 39.41 kg/m  as calculated from the following:    Height as of this encounter: 1.651 m (5' 5\").    Weight as of this encounter: 107.4 kg (236 lb 12.8 oz).    Medication Reconciliation: complete  ESS 3  Neck circumference: 47 centimeters.  Nathaniel Bee MA       "

## 2024-01-29 NOTE — PROGRESS NOTES
"Patient returns to discuss CPAP usage and to get new supplies as patient's previous supplies are malfunctioning.    Patient was diagnosed with sleep disordered breathing in particular with an obstructive component in 2021 with an AHI of 62.1.  The patient has been treated with autotitrating CPAP (pressures 5 to 15 cmH20).  The patient indicates treatment is going well (if he has supplies).   Indicates that they feel more awake and alert when they use it.  Endorsed feeling better when he uses CPAP. Sometimes drooling with the mask is a problem.    However, per the download patient is using the device > 4 hours  3 % of nights due to mask malfunctioning. He indicates this is secondary to malfunctioning supplies and if they are addressed he will be much more adherent.    Discussed the improvement in daytime alertness with PAP therapy as well as the cardiovascular benefits.  Patient is highly motivated to use therapy.      Patient has a history of currently treated HTN with losartan, amlodipine.  Vitals today showed:  BP  111/76   Pulse 94   Ht 1.651 m (5' 5\")   Wt 107.4 kg (236 lb 12.8 oz)   SpO2 98%   BMI 39.41 kg/m      Orders placed for new supplies and patient will follow up in one year or earlier PRN.      All questions have been answered.  Its a pleasure being asked to participate in the patients care.  Patient has been advised on the importance of never driving if tired or sleepy.     Stephania Kevin MD  PGY-4    Outpatient Sleep Medicine Staff  I have seen and evaluated the patient and discussed with Dr Kevin on 1-29-24.  I supervised Dr Kevin during the visit and agree with the documentation.      In addition to face to face time I have also reviewed the patients chart, coordinated care, assisted in placing orders and documentation.  Total time (Face-to-Face, care coordination, orders, documentation) spent on 1-29-24 was 25 minutes.     Nabeel Lucero MD    "

## 2024-02-03 ENCOUNTER — TRANSFERRED RECORDS (OUTPATIENT)
Dept: MULTI SPECIALTY CLINIC | Facility: CLINIC | Age: 49
End: 2024-02-03

## 2024-02-03 LAB — RETINOPATHY: NORMAL

## 2024-02-06 ENCOUNTER — OFFICE VISIT (OUTPATIENT)
Dept: OPHTHALMOLOGY | Facility: CLINIC | Age: 49
End: 2024-02-06
Attending: OPHTHALMOLOGY
Payer: COMMERCIAL

## 2024-02-06 DIAGNOSIS — Z98.890 POSTOPERATIVE EYE STATE: ICD-10-CM

## 2024-02-06 DIAGNOSIS — H17.9 CORNEAL SCAR: Primary | ICD-10-CM

## 2024-02-06 DIAGNOSIS — H04.123 DRY EYE SYNDROME OF BOTH EYES: ICD-10-CM

## 2024-02-06 DIAGNOSIS — H02.59 FLOPPY EYELID SYNDROME OF RIGHT EYE: ICD-10-CM

## 2024-02-06 PROCEDURE — 99214 OFFICE O/P EST MOD 30 MIN: CPT | Performed by: OPHTHALMOLOGY

## 2024-02-06 PROCEDURE — 99214 OFFICE O/P EST MOD 30 MIN: CPT | Mod: 24 | Performed by: OPHTHALMOLOGY

## 2024-02-06 ASSESSMENT — VISUAL ACUITY
OS_SC: 20/25
OS_SC+: -1
METHOD: SNELLEN - LINEAR
OD_SC: 20/60
OD_PH_SC: 20/30

## 2024-02-06 ASSESSMENT — TONOMETRY
OS_IOP_MMHG: 15
OD_IOP_MMHG: 11
IOP_METHOD: ICARE

## 2024-02-06 ASSESSMENT — SLIT LAMP EXAM - LIDS: COMMENTS: NORMAL

## 2024-02-06 NOTE — NURSING NOTE
Chief Complaints and History of Present Illnesses   Patient presents with    Follow Up     Dry eye syndrome of both eyes     Chief Complaint(s) and History of Present Illness(es)       Follow Up              Comments: Dry eye syndrome of both eyes              Comments    Pt states the vision fluctuates both eye   Some dryness, using AT's 5-6 X/day   No tearing or discharge  No eye pain     Jazmín Correia COT 9:47 AM February 6, 2024                      .

## 2024-02-06 NOTE — PROGRESS NOTES
Chief complaint   Follow up    HPI    Efrain Lock 48 year old male referred by Dr. Maldonado for dry eye both eyes and right eye corneal scar evaluation. Patient reports years of blurry vision in the right eye. Sometimes right eye is itchy, and sometimes the right eye is red, sensitive to light always needs sunglasses. He thinks these symptoms started after the eyelid surgery in 2020 (s/p wedge resection upper eyelid for floppy eyelid syndrome, and right lower eyelid ectropion with LTS) No eye pain. Left eye does not bother him. Dilated exam 2/20/23 was within normal limits.     Interval 10/10/23: Patient here for follow up of neurotrophic cornea with dry eye. Was last seen 9/7/23. Was referred at last visit to plastics for floppy lid and palpebral lesion eval but has not yet made appt or been seen by our plastics team. Has not tried eye goggles at bedtime, not interested in these.    Drops Currently Taking     Artificial tear ointment at bedtime right eye   FML BID right eye   Preservative free artificial tears q1-2h while awake    Past ocular history   Prior eye surgery/laser/Trauma:   12/3/2020 (Andrzej)  1. Right upper eyelid full thickness wedge resection (greater than 25% of eyelid).   2. Right lower eyelid ectropion repair with lateral tarsal strip procedure.     Right eyelid nevus excision     CTL wearer:No  Glasses : reading glasses sometimes  Family Hx of eye disease: father has glaucoma    PMH     Past Medical History:   Diagnosis Date    Diabetes (H)     HTN (hypertension)     Microalbuminuria 10/30/2013    Sleep apnea     Tobacco abuse 07/12/2012       PSH     Past Surgical History:   Procedure Laterality Date    LAPAROSCOPIC APPENDECTOMY N/A 3/23/2023    Procedure: APPENDECTOMY, LAPAROSCOPIC;  Surgeon: Nabeel Zabala MD;  Location:  OR    REPAIR ECTROPION Right 12/3/2020    Procedure: Right lower eyelid ectropion repair;  Surgeon: Oahn Donnelly MD;  [place] [facility] [place]    REPAIR  ECTROPION BILATERAL Right 1/4/2024    Procedure: Right lower eyelid ectropion repair;  Surgeon: Oanh Donnelly MD;  [place] [facility] [place]    REPAIR PTOSIS      REPAIR PTOSIS Right 1/4/2024    Procedure: Right upper eyelid ptosis repair, Right Upper lid pentagonal wedge ressection;  Surgeon: Oanh Donnelly MD;  [place] [facility] [place]    SURGICAL HISTORY OF -       rt knee surgery    WEDGE RESECTION EYELID Right 12/3/2020    Procedure: Right upper eyelid pentagonal wedge;  Surgeon: Oanh Donnelly MD;  [place] [facility] [place]       Meds     Current Outpatient Medications   Medication    amLODIPine (NORVASC) 10 MG tablet    atorvastatin (LIPITOR) 20 MG tablet    carboxymethylcellulose PF (REFRESH LIQUIGEL) 1 % ophthalmic gel    erythromycin (ROMYCIN) 5 MG/GM ophthalmic ointment    fluorometholone (FML LIQUIFILM) 0.1 % ophthalmic suspension    hydrocortisone 2.5 % ointment    ketoconazole (NIZORAL) 2 % external shampoo    losartan (COZAAR) 100 MG tablet    loteprednol (LOTEMAX) 0.5 % ophthalmic suspension    metFORMIN (GLUCOPHAGE XR) 500 MG 24 hr tablet    metFORMIN (GLUCOPHAGE) 500 MG tablet    Multiple Vitamins-Minerals (MULTIVITAMIN ADULT PO)    White Petrolatum-Mineral Oil (REFRESH P.M.) OINT     No current facility-administered medications for this visit.       Labs   None    Imaging   None      Assessment/Plan   # Neurotrophic keratopathy, right eye   # Dry Eye Syndrome  Corneal sensation greatly diminished right eye cotton tip test 8/17/23  Used to sleep on his right side on his stomach and has very floppy eyelids - likely etiology - No he says that he sleeps on back (see flopy eye lid note)  No history of brain surgery, no history of glaucoma drops, +DM  Previously had punctal plugs however they fell out.  - Epilated several upper eyelid trichiatic lashes 9/7/23 could be contributing to ocular surface disruption  - Continue PFAT q 1 hour-   - Continue artificial tear ointment QHS  - FML daily OD    #Papillary  conjunctivitis, right eye   Leukoplakia and flat papillomatous lesion vs scar? upper eyelid palpebral conjunctiva temporally.  Photo below  - see Dr SHARIF For eval.      #Floppy eye lid syndrome each eye.  #Entropion right eye  S/p 12/3/2020 (Andrzej): Previous:  1. Right upper eyelid full thickness wedge resection (greater than 25% of eyelid).   2. Right lower eyelid ectropion repair with lateral tarsal strip procedure.   - 10/10/23: pt did not see oculoplasticvs and is not wering eye shiels but is not rubbing eye(s) and sleeps only on his back (he says).    2/6/24: Use preservative free gtts - pt was using preserved gel gtt. Suggested celluvisc 6 - 8 times a day. May use kelli or gel at night.      Follow up: 6 mths cornea - call sooner with any problems.      Joby Rubalcava MD      Attending Physician Attestation:  Complete documentation of historical and exam elements from today's encounter can be found in the full encounter summary report (not reduplicated in this progress note).  I personally obtained the chief complaint(s) and history of present illness.  I confirmed and edited as necessary the review of systems, past medical/surgical history, family history, social history, and examination findings as documented by others; and I examined the patient myself.  I personally reviewed the relevant tests, images, and reports as documented above.  I formulated and edited as necessary the assessment and plan and discussed the findings and management plan with the patient and family. - Joby Rubalcava MD

## 2024-03-13 ENCOUNTER — TELEPHONE (OUTPATIENT)
Dept: OPHTHALMOLOGY | Facility: CLINIC | Age: 49
End: 2024-03-13
Payer: COMMERCIAL

## 2024-03-25 ENCOUNTER — OFFICE VISIT (OUTPATIENT)
Dept: FAMILY MEDICINE | Facility: CLINIC | Age: 49
End: 2024-03-25
Payer: COMMERCIAL

## 2024-03-25 VITALS
TEMPERATURE: 98.3 F | BODY MASS INDEX: 40.15 KG/M2 | OXYGEN SATURATION: 98 % | WEIGHT: 241 LBS | RESPIRATION RATE: 21 BRPM | HEIGHT: 65 IN | SYSTOLIC BLOOD PRESSURE: 118 MMHG | DIASTOLIC BLOOD PRESSURE: 68 MMHG | HEART RATE: 86 BPM

## 2024-03-25 DIAGNOSIS — Z00.00 ANNUAL PHYSICAL EXAM: Primary | ICD-10-CM

## 2024-03-25 DIAGNOSIS — L30.9 DERMATITIS: ICD-10-CM

## 2024-03-25 DIAGNOSIS — E66.01 MORBID OBESITY (H): ICD-10-CM

## 2024-03-25 DIAGNOSIS — I10 BENIGN ESSENTIAL HYPERTENSION: ICD-10-CM

## 2024-03-25 DIAGNOSIS — E11.9 TYPE 2 DIABETES, HBA1C GOAL < 7% (H): ICD-10-CM

## 2024-03-25 DIAGNOSIS — E78.5 HYPERLIPIDEMIA LDL GOAL <100: ICD-10-CM

## 2024-03-25 LAB
ANION GAP SERPL CALCULATED.3IONS-SCNC: 14 MMOL/L (ref 7–15)
BUN SERPL-MCNC: 12.9 MG/DL (ref 6–20)
CALCIUM SERPL-MCNC: 8.9 MG/DL (ref 8.6–10)
CHLORIDE SERPL-SCNC: 104 MMOL/L (ref 98–107)
CHOLEST SERPL-MCNC: 161 MG/DL
CREAT SERPL-MCNC: 1.25 MG/DL (ref 0.67–1.17)
DEPRECATED HCO3 PLAS-SCNC: 23 MMOL/L (ref 22–29)
EGFRCR SERPLBLD CKD-EPI 2021: 70 ML/MIN/1.73M2
FASTING STATUS PATIENT QL REPORTED: YES
GLUCOSE SERPL-MCNC: 143 MG/DL (ref 70–99)
HBA1C MFR BLD: 8.3 % (ref 0–5.6)
HDLC SERPL-MCNC: 39 MG/DL
LDLC SERPL CALC-MCNC: 60 MG/DL
NONHDLC SERPL-MCNC: 122 MG/DL
POTASSIUM SERPL-SCNC: 3.4 MMOL/L (ref 3.4–5.3)
SODIUM SERPL-SCNC: 141 MMOL/L (ref 135–145)
TRIGL SERPL-MCNC: 310 MG/DL

## 2024-03-25 PROCEDURE — 91320 SARSCV2 VAC 30MCG TRS-SUC IM: CPT | Performed by: FAMILY MEDICINE

## 2024-03-25 PROCEDURE — 90480 ADMN SARSCOV2 VAC 1/ONLY CMP: CPT | Performed by: FAMILY MEDICINE

## 2024-03-25 PROCEDURE — 90750 HZV VACC RECOMBINANT IM: CPT | Performed by: FAMILY MEDICINE

## 2024-03-25 PROCEDURE — 36415 COLL VENOUS BLD VENIPUNCTURE: CPT | Performed by: FAMILY MEDICINE

## 2024-03-25 PROCEDURE — 90471 IMMUNIZATION ADMIN: CPT | Performed by: FAMILY MEDICINE

## 2024-03-25 PROCEDURE — 80048 BASIC METABOLIC PNL TOTAL CA: CPT | Performed by: FAMILY MEDICINE

## 2024-03-25 PROCEDURE — 83036 HEMOGLOBIN GLYCOSYLATED A1C: CPT | Performed by: FAMILY MEDICINE

## 2024-03-25 PROCEDURE — 80061 LIPID PANEL: CPT | Performed by: FAMILY MEDICINE

## 2024-03-25 PROCEDURE — 99214 OFFICE O/P EST MOD 30 MIN: CPT | Mod: 25 | Performed by: FAMILY MEDICINE

## 2024-03-25 PROCEDURE — 99396 PREV VISIT EST AGE 40-64: CPT | Mod: 25 | Performed by: FAMILY MEDICINE

## 2024-03-25 RX ORDER — HYDROCORTISONE 25 MG/G
OINTMENT TOPICAL 2 TIMES DAILY
Qty: 28.35 G | Refills: 1 | Status: SHIPPED | OUTPATIENT
Start: 2024-03-25

## 2024-03-25 RX ORDER — BLOOD-GLUCOSE METER
EACH MISCELLANEOUS
Qty: 1 KIT | Refills: 0 | Status: SHIPPED | OUTPATIENT
Start: 2024-03-25

## 2024-03-25 RX ORDER — AMLODIPINE BESYLATE 10 MG/1
10 TABLET ORAL DAILY
Qty: 90 TABLET | Refills: 3 | Status: SHIPPED | OUTPATIENT
Start: 2024-03-25

## 2024-03-25 RX ORDER — ATORVASTATIN CALCIUM 20 MG/1
20 TABLET, FILM COATED ORAL DAILY
Qty: 90 TABLET | Refills: 3 | Status: SHIPPED | OUTPATIENT
Start: 2024-03-25

## 2024-03-25 RX ORDER — METFORMIN HCL 500 MG
1000 TABLET, EXTENDED RELEASE 24 HR ORAL 2 TIMES DAILY WITH MEALS
Qty: 180 TABLET | Refills: 3 | Status: SHIPPED | OUTPATIENT
Start: 2024-03-25 | End: 2024-03-25

## 2024-03-25 RX ORDER — METFORMIN HCL 500 MG
500 TABLET, EXTENDED RELEASE 24 HR ORAL 2 TIMES DAILY WITH MEALS
Qty: 180 TABLET | Refills: 3 | Status: SHIPPED | OUTPATIENT
Start: 2024-03-25 | End: 2024-09-26

## 2024-03-25 RX ORDER — BLOOD SUGAR DIAGNOSTIC
STRIP MISCELLANEOUS
Qty: 100 STRIP | Refills: 3 | Status: SHIPPED | OUTPATIENT
Start: 2024-03-25

## 2024-03-25 RX ORDER — GLUCOSAMINE HCL/CHONDROITIN SU 500-400 MG
CAPSULE ORAL
Qty: 100 EACH | Refills: 3 | Status: SHIPPED | OUTPATIENT
Start: 2024-03-25

## 2024-03-25 SDOH — HEALTH STABILITY: PHYSICAL HEALTH: ON AVERAGE, HOW MANY MINUTES DO YOU ENGAGE IN EXERCISE AT THIS LEVEL?: 80 MIN

## 2024-03-25 SDOH — HEALTH STABILITY: PHYSICAL HEALTH: ON AVERAGE, HOW MANY DAYS PER WEEK DO YOU ENGAGE IN MODERATE TO STRENUOUS EXERCISE (LIKE A BRISK WALK)?: 5 DAYS

## 2024-03-25 ASSESSMENT — SOCIAL DETERMINANTS OF HEALTH (SDOH): HOW OFTEN DO YOU GET TOGETHER WITH FRIENDS OR RELATIVES?: ONCE A WEEK

## 2024-03-25 ASSESSMENT — PAIN SCALES - GENERAL: PAINLEVEL: NO PAIN (0)

## 2024-03-25 NOTE — COMMUNITY RESOURCES LIST (ENGLISH)
March 25, 2024           YOUR PERSONALIZED LIST OF SERVICES & PROGRAMS           & SHELTER    Housing      Better [employer] - A Better Society  [address] [place] [address] (Distance: 1.5 miles)  Website: http://abettersHaven [facility]al Hospital of Philadelphiaety.org  Language: English      Free - Client Services  [address] [place] [address] (Distance: 20.6 miles)  Phone: (654) 911-4319  Website: https://epicurio/  Language: English  Fee: Free  Transportation Options: Free transportation      [facility] Link - Housing Stabilization Services  Phone: (871) 587-7828  Website: https://Orange Leap/Housing-Stabilization.html  Language: English  Hours: Mon 9:00 AM - 5:00 PM Tue 9:00 AM - 5:00 PM Wed 9:00 AM - 5:00 PM Thu 9:00 AM - 5:00 PM Fri 9:00 AM - 5:00 PM  Fee: Insurance  Accessibility: Deaf or hard of hearing, Translation services    Case Management      Living - Housing Stabilization Services  [address] [place] [address] (Distance: 14.1 miles)  Phone: (865) 537-6076  Website: https://www.MasteryConnect  Language: Turkmen, English, Nicaraguan  Fee: Insurance, Self pay      [employer] - Housing Stabilization Services  Phone: (984) 797-3576  Website: https://homebasemn.com/  Language: English  Hours: Mon 8:00 AM - 4:00 PM Tue 8:00 AM - 4:00 PM Wed 8:00 AM - 4:00 PM Thu 8:00 AM - 4:00 PM Fri 8:00 AM - 4:00 PM  Fee: Free  Accessibility: Blind accommodation, Deaf or hard of hearing  Transportation Options: Free transportation      Today [facility] Housing Stabilization Services (HSS)  Phone: (274) 280-1073  Website: https://www.[facility] Scorecard.net/resources  Language: English, Croatian  Hours: Mon 8:00 AM - 4:00 PM Tue 8:00 AM - 4:00 PM Wed 8:00 AM - 4:00 PM Thu 8:00 AM - 4:00 PM Fri 8:00 AM - 4:00 PM  Fee: Free, Insurance  Accessibility: Ada accessible, Blind accommodation, Deaf or hard of hearing, Translation services    Drop-In Services      [facility] Library - [place] or  [facility]  [address] [place] [address] (Distance: 2.4 miles)  Language: English  Fee: Free      Better Society - A Better Society  [address]  [place] [address] (Distance: 1.5 miles)  Website: http://abettersociety.org  Language: English      LOVE - LAUNDRY LOVE  Website: http://www.laundrylove.org               IMPORTANT NUMBERS & WEBSITES        Emergency Services  911  .   United Way  211 http://211unitedway.org  .   Poison Control  (441) 941-9403 http://[place]poison.org http://wis[place]poison.org  .     Suicide and Crisis Lifeline  988 http://988Tucoolaline.org  .   Childhelp [place] Child Abuse Hotline  564.405.8741 http://Childhelphotline.org   .   [place] Sexual Assault Hotline  (419) 643-9735 (HOPE) http://Tr[employer] Corporationn.org   .     [place] Runaway Safeline  (945) 693-3205 (RUNAWAY) http://Quantified CommunicationsruRetrace.org  .   Pregnancy & Postpartum Support  Call/text 360-088-9177  [place]: http://ppsupportmn.org  [place]: http://Wanova.com/wi  .   Substance Abuse National Helpline ([facility])  573-718-HELP (5494) http://Findtreatment.gov   .                DISCLAIMER: [employer] does not endorse any service providers mentioned in this resource list. [employer] does not guarantee that the services mentioned in this resource list will be available to you or will improve your health or wellness.    [facility]

## 2024-03-25 NOTE — PROGRESS NOTES
Preventive Care Visit  [facility] DAKOTA Cesar MD, Family Medicine  Mar 25, 2024      Assessment & Plan     Annual physical exam      Benign essential hypertension  Well controlled  - amLODIPine (NORVASC) 10 MG tablet; Take 1 tablet (10 mg) by mouth daily  - Basic metabolic panel; Future  - Basic metabolic panel    Hyperlipidemia LDL goal <100  Patient reports that he has been consistently taking atorvastatin now.  Repeat labs ordered.  Previously LDL was above 100  - atorvastatin (LIPITOR) 20 MG tablet; Take 1 tablet (20 mg) by mouth daily  - Lipid panel reflex to direct LDL Fasting; Future  - Lipid panel reflex to direct LDL Fasting    Type 2 diabetes, HbA1c goal < 7% (H)  Diabetic control has gotten worse.  Continue the use of metformin  mg 1 tablet in the morning and 1 tablet in the evening.  Adding on Ozempic at the starting dose.  Patient has been on the medication in the past and did benefit from that.  He is not sure why he stopped the medication and per my chart review I am not sure either what happened but he did improve his diabetic score and lost weight with the medication therefore we will restart that again.  Plan to follow-up in 3 months again for recheck  - HEMOGLOBIN A1C; Future  - blood glucose monitoring (ONE TOUCH ULTRA 2) meter device kit; Use to test blood sugar 1-2 times daily.  - blood glucose (ONETOUCH ULTRA) test strip; Use to test blood sugar 1-2 times daily.  - blood glucose calibration (ONETOUCH ULTRA IN VITRO LIQUID) solution; Use to calibrate blood glucose monitor as directed.  - alcohol swab prep pads; Use to swab area of injection/gerald as directed.  - blood glucose (NO BRAND SPECIFIED) lancets standard; Use to test blood sugar 1-2 times daily or as directed.  - Basic metabolic panel; Future  - metFORMIN (GLUCOPHAGE XR) 500 MG 24 hr tablet; Take 1 tablet (500 mg) by mouth 2 times daily (with meals)  - HEMOGLOBIN A1C  - Basic metabolic panel  -  "semaglutide (OZEMPIC) 2 MG/3ML pen; Inject 0.25 mg Subcutaneous every 7 days    Dermatitis    - hydrocortisone 2.5 % ointment; Apply topically 2 times daily       Morbid obesity (H)  Starting patient on Ozempic for weight loss management.  Encouraged to eat a healthier diet and exercise regularly          Nicotine/Tobacco Cessation  He reports that he has been smoking cigarettes. He has been smoking an average of 0.3 packs per day. He has never used smokeless tobacco.  Nicotine/Tobacco Cessation Plan  Information offered: Patient not interested at this time      BMI  Estimated body mass index is 40.15 kg/m  as calculated from the following:    Height as of this encounter: 1.65 m (5' 4.96\").    Weight as of this encounter: 109.3 kg (241 lb).   Weight management plan: Discussed healthy diet and exercise guidelines    Counseling  Appropriate preventive services were discussed with this patient, including applicable screening as appropriate for fall prevention, nutrition, physical activity, Tobacco-use cessation, weight loss and cognition.  Checklist reviewing preventive services available has been given to the patient.  Reviewed patient's diet, addressing concerns and/or questions.           Lilibeth Zuniga is a 48 year old, presenting for the following:  Physical        3/25/2024    10:05 AM   Additional Questions   Roomed by Nicole MCCLAIN        Via the Health Maintenance questionnaire, the patient has reported the following services have been completed -Eye Exam, this information has been sent to the abstraction team.  Health Care Directive  Patient does not have a Health Care Directive or Living Will:     HPI        Diabetes Follow-up    How often are you checking your blood sugar? Not at all  What concerns do you have today about your diabetes? None   Do you have any of these symptoms? (Select all that apply)  No numbness or tingling in feet.  No redness, sores or blisters on feet.  No complaints of excessive " thirst.  No reports of blurry vision.  No significant changes to weight.      BP Readings from Last 2 Encounters:   03/25/24 118/68   01/29/24 (!) 150/83     Hemoglobin A1C (%)   Date Value   03/25/2024 8.3 (H)   12/26/2023 7.2 (H)   08/17/2017 6.0   04/11/2017 6.0     LDL Cholesterol Calculated (mg/dL)   Date Value   12/26/2023 109 (H)   01/04/2023 44   02/26/2021 115 (H)   02/28/2020 126 (H)     LDL Cholesterol Direct (mg/dL)   Date Value   08/04/2021 126 (H)             Hyperlipidemia Follow-Up    Are you regularly taking any medication or supplement to lower your cholesterol?   Yes- statin  Are you having muscle aches or other side effects that you think could be caused by your cholesterol lowering medication?  No      3/25/2024   General Health   How would you rate your overall physical health? Good   Feel stress (tense, anxious, or unable to sleep) Not at all         3/25/2024   Nutrition   Three or more servings of calcium each day? Yes   Diet: Regular (no restrictions)   How many servings of fruit and vegetables per day? (!) 0-1   How many sweetened beverages each day? (!) 2         3/25/2024   Exercise   Days per week of moderate/strenous exercise 5 days   Average minutes spent exercising at this level 80 min         3/25/2024   Social Factors   Frequency of gathering with friends or relatives Once a week   Worry food won't last until get money to buy more No   Food not last or not have enough money for food? No   Do you have housing?  No   Are you worried about losing your housing? No   Lack of transportation? No   Unable to get utilities (heat,electricity)? No   Want help with housing or utility concern? No   (!) HOUSING CONCERN PRESENT       No data to display                   3/25/2024   Dental   Dentist two times every year? Yes         3/25/2024   TB Screening   Were you born outside of the [place]? Yes               3/25/2024   Substance Use   Alcohol more than 3/day or more than 7/wk No   Do you use any  other substances recreationally? No     Social History     Tobacco Use    Smoking status: Every Day     Packs/day: .3     Types: Cigarettes    Smokeless tobacco: Never   Vaping Use    Vaping Use: Never used   Substance Use Topics    Alcohol use: No     Alcohol/week: 0.0 standard drinks of alcohol    Drug use: No           3/25/2024   STI Screening   New sexual partner(s) since last STI/HIV test? No   ASCVD Risk   The 10-year ASCVD risk score (Valdo DK, et al., 2019) is: 20.1%    Values used to calculate the score:      Age: 48 years      Sex: Male      Is Non- : No      Diabetic: Yes      Tobacco smoker: Yes      Systolic Blood Pressure: 118 mmHg      Is BP treated: Yes      HDL Cholesterol: 38 mg/dL      Total Cholesterol: 210 mg/dL            3/25/2024   Contraception/Family Planning   Questions about contraception or family planning No        Reviewed and updated as needed this visit by Provider                    Patient Active Problem List   Diagnosis    Tobacco abuse    Essential hypertension, benign    Morbid obesity (H)    Family history of prostate cancer    Microalbuminuria    Hyperlipidemia LDL goal <100    Left shoulder pain    Floppy eyelid syndrome of right eye    Senile ectropion of right lower eyelid    Mechanical entropion of right eye    Corneal scar    New onset type 2 diabetes mellitus (H)    Hypokalemia    GIULIA (obstructive sleep apnea)    Perforated appendicitis    Dry eye syndrome of both eyes    Dermatochalasis of both upper eyelids     Past Surgical History:   Procedure Laterality Date    LAPAROSCOPIC APPENDECTOMY N/A 3/23/2023    Procedure: APPENDECTOMY, LAPAROSCOPIC;  Surgeon: Nabeel Zabala MD;  Location: [facility] OR    REPAIR ECTROPION Right 12/3/2020    Procedure: Right lower eyelid ectropion repair;  Surgeon: Oanh Donnelly MD;  Location[place] [facility] [place]    REPAIR ECTROPION BILATERAL Right 1/4/2024    Procedure: Right lower eyelid ectropion repair;   Surgeon: Oanh Donnelly MD;  Location: [facility] [place] [place]    REPAIR PTOSIS      REPAIR PTOSIS Right 1/4/2024    Procedure: Right upper eyelid ptosis repair, Right Upper lid pentagonal wedge ressection;  Surgeon: Oanh Donnelly MD;  Location: [facility] [place]    SURGICAL HISTORY OF -       rt knee surgery    WEDGE RESECTION EYELID Right 12/3/2020    Procedure: Right upper eyelid pentagonal wedge;  Surgeon: Oanh Donnelly MD;  Location: [facility] [place]       Social History     Tobacco Use    Smoking status: Every Day     Packs/day: .3     Types: Cigarettes    Smokeless tobacco: Never   Substance Use Topics    Alcohol use: No     Alcohol/week: 0.0 standard drinks of alcohol     Family History   Problem Relation Age of Onset    Prostate Cancer Father 80    Macular Degeneration Father     Hypertension Paternal Grandmother     Colon Cancer No family hx of     Diabetes No family hx of     Myocardial Infarction No family hx of     Glaucoma No family hx of          Current Outpatient Medications   Medication Sig Dispense Refill    alcohol swab prep pads Use to swab area of injection/gerald as directed. 100 each 3    amLODIPine (NORVASC) 10 MG tablet Take 1 tablet (10 mg) by mouth daily 90 tablet 3    atorvastatin (LIPITOR) 20 MG tablet Take 1 tablet (20 mg) by mouth daily 90 tablet 3    blood glucose (NO BRAND SPECIFIED) lancets standard Use to test blood sugar 1-2 times daily or as directed. 100 each 3    blood glucose (ONETOUCH ULTRA) test strip Use to test blood sugar 1-2 times daily. 100 strip 3    blood glucose calibration (ONETOUCH ULTRA IN VITRO LIQUID) solution Use to calibrate blood glucose monitor as directed. 1 each 1    blood glucose monitoring (ONE TOUCH ULTRA 2) meter device kit Use to test blood sugar 1-2 times daily. 1 kit 0    fluorometholone (FML LIQUIFILM) 0.1 % ophthalmic suspension Place 1 drop into the right eye daily 10 mL 4    hydrocortisone 2.5 % ointment Apply topically 2 times daily 28.35 g 1    ketoconazole  "(NIZORAL) 2 % external shampoo Use every 1-2 days when flared. Leave in few minutes before rinsing. Use twice weekly to prevent flares. 120 mL 11    metFORMIN (GLUCOPHAGE XR) 500 MG 24 hr tablet Take 1 tablet (500 mg) by mouth 2 times daily (with meals) 180 tablet 3    semaglutide (OZEMPIC) 2 MG/3ML pen Inject 0.25 mg Subcutaneous every 7 days 3 mL 2    carboxymethylcellulose PF (REFRESH LIQUIGEL) 1 % ophthalmic gel Place 1 drop into the right eye 8 times daily 120 each 11    losartan (COZAAR) 100 MG tablet Take 1 tablet (100 mg) by mouth daily 90 tablet 0    loteprednol (LOTEMAX) 0.5 % ophthalmic suspension Place 1-2 drops into the right eye 4 times daily 10 mL 3    Multiple Vitamins-Minerals (MULTIVITAMIN ADULT PO) Take 1 tablet by mouth daily       Allergies   Allergen Reactions    Lisinopril Cough         Review of Systems  CONSTITUTIONAL: NEGATIVE for fever, chills, change in weight  INTEGUMENTARY/SKIN: NEGATIVE for worrisome rashes, moles or lesions  EYES: NEGATIVE for vision changes or irritation  ENT/MOUTH: NEGATIVE for ear, mouth and throat problems  RESP: NEGATIVE for significant cough or SOB  BREAST: NEGATIVE for masses, tenderness or discharge  CV: NEGATIVE for chest pain, palpitations or peripheral edema  GI: NEGATIVE for nausea, abdominal pain, heartburn, or change in bowel habits  : NEGATIVE for frequency, dysuria, or hematuria  MUSCULOSKELETAL: NEGATIVE for significant arthralgias or myalgia  NEURO: NEGATIVE for weakness, dizziness or paresthesias  ENDOCRINE: NEGATIVE for temperature intolerance, skin/hair changes  HEME: NEGATIVE for bleeding problems  PSYCHIATRIC: NEGATIVE for changes in mood or affect     Objective    Exam  /68 (BP Location: Left arm, Patient Position: Sitting, Cuff Size: Adult Large)   Pulse 86   Temp 98.3  F (36.8  C) (Tympanic)   Resp 21   Ht 1.65 m (5' 4.96\")   Wt 109.3 kg (241 lb)   SpO2 98%   BMI 40.15 kg/m     Estimated body mass index is 40.15 kg/m  as " "calculated from the following:    Height as of this encounter: 1.65 m (5' 4.96\").    Weight as of this encounter: 109.3 kg (241 lb).    Physical Exam  GENERAL: alert and no distress  EYES: Eyes grossly normal to inspection, PERRL and conjunctivae and sclerae normal  HENT: ear canals and TM's normal, nose and mouth without ulcers or lesions  NECK: no adenopathy, no asymmetry, masses, or scars  RESP: lungs clear to auscultation - no rales, rhonchi or wheezes  CV: regular rate and rhythm, normal S1 S2, no S3 or S4, no murmur, click or rub, no peripheral edema  ABDOMEN: soft, nontender, no hepatosplenomegaly, no masses and bowel sounds normal  MS: no gross musculoskeletal defects noted, no edema  SKIN: no suspicious lesions or rashes  NEURO: Normal strength and tone, mentation intact and speech normal  PSYCH: mentation appears normal, affect normal/bright    Results for orders placed or performed in visit on 03/25/24   HEMOGLOBIN A1C     Status: Abnormal   Result Value Ref Range    Hemoglobin A1C 8.3 (H) 0.0 - 5.6 %    Narrative    Results consistent with previous.           Signed Electronically by: Henry Cesar MD    "

## 2024-04-17 DIAGNOSIS — I10 BENIGN ESSENTIAL HYPERTENSION: ICD-10-CM

## 2024-04-17 RX ORDER — LOSARTAN POTASSIUM 100 MG/1
100 TABLET ORAL DAILY
Qty: 90 TABLET | Refills: 2 | Status: SHIPPED | OUTPATIENT
Start: 2024-04-17 | End: 2024-09-25

## 2024-04-17 NOTE — TELEPHONE ENCOUNTER
Prescription approved per [facility] Refill Protocol.  Marcia Kirk, RN  [facility] Triage Nurse

## 2024-05-14 ENCOUNTER — NURSE TRIAGE (OUTPATIENT)
Dept: FAMILY MEDICINE | Facility: CLINIC | Age: 49
End: 2024-05-14

## 2024-05-14 NOTE — TELEPHONE ENCOUNTER
Nurse Triage SBAR    Is this a 2nd Level Triage? NO    Situation:   Patient transferred via [facility] with intermittent rash over the past 1.5 months    Background:   Received zoster injection at PCP [facility] on 3/25/24    Assessment:   Patient reports experiencing:  Fever x2 days, now resolved  Red rash appearing like hives to face and abdomen, itchy  Rash changes locations each day  Received antihistamine and steroid at urgent care in beginning of April - rash did not improve   Began taking zyrtec over the counter at lunchtime  Rash resolves by 7-8pm  But reappears by the next morning  Denies difficulty breathing at this time    Protocol Recommended Disposition:   See in Office Today    Recommendation:   Advised to be seen today  Patient declined stating he is on his way to work   Patient requesting OV tomorrow morning if available  Please call patient back to assist with scheduling    Routed to team    Does the patient meet one of the following criteria for ADS visit consideration? 16+ years old, with an [facility] PCP     TIP  Providers, please consider if this condition is appropriate for management at one of our Acute and Diagnostic Services sites.     If patient is a good candidate, please use dotphrase <dot>triageresponse and select Refer to ADS to document.    Thanks,  Kylah DYER RN    Reason for Disposition   MODERATE-SEVERE hives persist (i.e., hives interfere with normal activities or work) and taking antihistamine (e.g., Benadryl, Claritin) > 24 hours    Additional Information   Negative: Difficulty breathing or wheezing now   Negative: Rapid onset of swollen tongue   Negative: Rapid onset of hoarseness or cough   Negative: Very weak (can't stand)   Negative: Difficult to awaken or acting confused (e.g., disoriented, slurred speech)   Negative: Life-threatening reaction (anaphylaxis) in the past to similar substance (e.g., food, insect bite/sting, chemical, etc.) and < 2 hours since exposure   Negative: Sounds like  a life-threatening emergency to the triager   Negative: Bee, wasp, or yellow jacket sting within last 24 hours   Negative: Taking a new medicine now or within last 3 days  (Exception: Antihistamine, decongestant or other OTC cough/cold medicines.)   Negative: Doesn't match the SYMPTOMS of hives   Negative: Swollen tongue   Negative: Widespread hives, itching, or facial swelling and onset < 2 hours of exposure to high-risk allergen (e.g., 1st dose of antibiotic, nuts, sting)   Negative: Patient sounds very sick or weak to the triager    Protocols used: Hives-A-OH

## 2024-05-14 NOTE — TELEPHONE ENCOUNTER
I can see him as an add-on at 10 AM on 5/15/24  Henry Cesar MD  [facility], [place]

## 2024-05-15 ENCOUNTER — OFFICE VISIT (OUTPATIENT)
Dept: FAMILY MEDICINE | Facility: CLINIC | Age: 49
End: 2024-05-15
Payer: COMMERCIAL

## 2024-05-15 VITALS
WEIGHT: 232.6 LBS | HEART RATE: 87 BPM | RESPIRATION RATE: 17 BRPM | HEIGHT: 65 IN | DIASTOLIC BLOOD PRESSURE: 79 MMHG | SYSTOLIC BLOOD PRESSURE: 120 MMHG | OXYGEN SATURATION: 99 % | BODY MASS INDEX: 38.75 KG/M2 | TEMPERATURE: 97.1 F

## 2024-05-15 DIAGNOSIS — E66.01 MORBID OBESITY (H): ICD-10-CM

## 2024-05-15 DIAGNOSIS — L50.9 HIVES: Primary | ICD-10-CM

## 2024-05-15 DIAGNOSIS — E11.9 TYPE 2 DIABETES, HBA1C GOAL < 7% (H): ICD-10-CM

## 2024-05-15 PROCEDURE — 99214 OFFICE O/P EST MOD 30 MIN: CPT | Performed by: FAMILY MEDICINE

## 2024-05-15 PROCEDURE — G2211 COMPLEX E/M VISIT ADD ON: HCPCS | Performed by: FAMILY MEDICINE

## 2024-05-15 RX ORDER — CALAMINE
LOTION (ML) TOPICAL
Qty: 180 ML | Refills: 1 | Status: SHIPPED | OUTPATIENT
Start: 2024-05-15 | End: 2024-09-25

## 2024-05-15 RX ORDER — LANCETS 33 GAUGE
EACH MISCELLANEOUS
COMMUNITY
Start: 2024-03-25

## 2024-05-15 RX ORDER — PREDNISONE 20 MG/1
TABLET ORAL
Qty: 18 TABLET | Refills: 0 | Status: SHIPPED | OUTPATIENT
Start: 2024-05-15 | End: 2024-09-25

## 2024-05-15 RX ORDER — FAMOTIDINE 20 MG/1
20 TABLET, FILM COATED ORAL 2 TIMES DAILY
Qty: 180 TABLET | Refills: 0 | Status: SHIPPED | OUTPATIENT
Start: 2024-05-15 | End: 2024-09-25

## 2024-05-15 RX ORDER — CALAMINE
LOTION (ML) TOPICAL
Qty: 180 ML | Refills: 1 | Status: SHIPPED | OUTPATIENT
Start: 2024-05-15 | End: 2024-05-15

## 2024-05-15 RX ORDER — CETIRIZINE HYDROCHLORIDE 10 MG/1
10 TABLET ORAL 2 TIMES DAILY
Qty: 180 TABLET | Refills: 0 | COMMUNITY
Start: 2024-05-15

## 2024-05-15 ASSESSMENT — PAIN SCALES - GENERAL: PAINLEVEL: NO PAIN (0)

## 2024-05-15 NOTE — PROGRESS NOTES
Assessment & Plan     Hives  Questionable etiology.  Recommending to start using Zyrtec twice a day, Pepcid twice a day and use prednisone tapering dose.  Use calamine lotion for itch.  If symptoms or not settling down, follow-up with allergist  - cetirizine (ZYRTEC) 10 MG tablet; Take 1 tablet (10 mg) by mouth 2 times daily  - famotidine (PEPCID) 20 MG tablet; Take 1 tablet (20 mg) by mouth 2 times daily  - predniSONE (DELTASONE) 20 MG tablet; Take 2 tabs by mouth daily x 5 days, then 1 tabs daily x 5 days, then 1/2 tab daily x 6 days, then stop  - Calamine external lotion; Use to the areas with hives and itching prn  - Adult Allergy/Asthma  Referral; Future    Type 2 diabetes, HbA1c goal < 7% (H)  Increasing dose of Ozempic from  0.25 to 0.5 mg  weekly.  - semaglutide (OZEMPIC) 2 MG/3ML pen; Inject 0.5 mg Subcutaneous every 7 days    Morbid obesity (H)    - semaglutide (OZEMPIC) 2 MG/3ML pen; Inject 0.5 mg Subcutaneous every 7 days    The longitudinal plan of care for the diagnosis(es)/condition(s) as documented were addressed during this visit. Due to the added complexity in care, I will continue to support Efrain in the subsequent management and with ongoing continuity of care.              Lilibeth Zuniga is a 48 year old, presenting for the following health issues:  Derm Problem (Rash on both arms noticed it after getting shingles vaccine at the end of March. Hives / rash move around to different parts of his body.  Was on face and legs now primarily on both arms today and legs )        5/15/2024     9:59 AM   Additional Questions   Roomed by Oanh BENTLEY     History of Present Illness       Diabetes:   He presents for follow up of diabetes.  He is checking home blood glucose one time daily.   He checks blood glucose before meals.  Blood glucose is sometimes over 200 and never under 70.  When his blood glucose is low, the patient is asymptomatic for confusion, blurred vision, lethargy and reports not  "feeling dizzy, shaky, or weak.   He has no concerns regarding his diabetes at this time.   He is not experiencing numbness or burning in feet, excessive thirst, blurry vision, weight changes or redness, sores or blisters on feet.           He eats 0-1 servings of fruits and vegetables daily.He consumes 1 sweetened beverage(s) daily.He exercises with enough effort to increase his heart rate 60 or more minutes per day.  He exercises with enough effort to increase his heart rate 4 days per week.   He is taking medications regularly.                   Objective    /79 (Patient Position: Sitting)   Pulse 87   Temp 97.1  F (36.2  C) (Temporal)   Resp 17   Ht 1.65 m (5' 4.96\")   Wt 105.5 kg (232 lb 9.6 oz)   SpO2 99%   BMI 38.75 kg/m    Body mass index is 38.75 kg/m .  Physical Exam   GENERAL: alert and no distress  RESP: lungs clear to auscultation - no rales, rhonchi or wheezes  CV: regular rate and rhythm, normal S1 S2, no S3 or S4, no murmur, click or rub, no peripheral edema  SKIN: Diffuse erythematous hives noted on the body          Signed Electronically by: Henry Cesar MD    "

## 2024-06-05 ENCOUNTER — TELEPHONE (OUTPATIENT)
Dept: OPHTHALMOLOGY | Facility: CLINIC | Age: 49
End: 2024-06-05
Payer: COMMERCIAL

## 2024-07-06 ENCOUNTER — HEALTH MAINTENANCE LETTER (OUTPATIENT)
Age: 49
End: 2024-07-06

## 2024-08-12 ENCOUNTER — OFFICE VISIT (OUTPATIENT)
Dept: OPHTHALMOLOGY | Facility: CLINIC | Age: 49
End: 2024-08-12
Attending: OPHTHALMOLOGY
Payer: COMMERCIAL

## 2024-08-12 DIAGNOSIS — H04.123 DRY EYE SYNDROME OF BOTH EYES: Primary | ICD-10-CM

## 2024-08-12 DIAGNOSIS — H02.59 FLOPPY EYELID SYNDROME OF RIGHT EYE: ICD-10-CM

## 2024-08-12 DIAGNOSIS — H16.231 NEUROTROPHIC KERATOCONJUNCTIVITIS OF RIGHT EYE: ICD-10-CM

## 2024-08-12 DIAGNOSIS — H17.9 RIGHT CORNEAL SCAR: ICD-10-CM

## 2024-08-12 DIAGNOSIS — H02.051 TRICHIASIS OF RIGHT UPPER EYELID: ICD-10-CM

## 2024-08-12 PROCEDURE — 99214 OFFICE O/P EST MOD 30 MIN: CPT | Mod: 25 | Performed by: OPHTHALMOLOGY

## 2024-08-12 PROCEDURE — 67820 REVISE EYELASHES: CPT | Performed by: OPHTHALMOLOGY

## 2024-08-12 PROCEDURE — 99211 OFF/OP EST MAY X REQ PHY/QHP: CPT | Performed by: OPHTHALMOLOGY

## 2024-08-12 RX ORDER — CYCLOSPORINE 0.5 MG/ML
1 EMULSION OPHTHALMIC 2 TIMES DAILY
Qty: 60 EACH | Refills: 11 | Status: SHIPPED | OUTPATIENT
Start: 2024-08-12 | End: 2024-09-25

## 2024-08-12 ASSESSMENT — VISUAL ACUITY
METHOD: NUMBERS - LINEAR
OS_SC: 20/25
OD_SC: 20/50
OS_SC+: -2
OD_PH_SC: 20/25
OD_PH_SC+: -3

## 2024-08-12 ASSESSMENT — TONOMETRY
OS_IOP_MMHG: 12
IOP_METHOD: ICARE
OD_IOP_MMHG: 10

## 2024-08-12 ASSESSMENT — SLIT LAMP EXAM - LIDS: COMMENTS: NORMAL

## 2024-08-12 ASSESSMENT — CONF VISUAL FIELD
OS_SUPERIOR_TEMPORAL_RESTRICTION: 0
OS_SUPERIOR_NASAL_RESTRICTION: 0
OD_SUPERIOR_TEMPORAL_RESTRICTION: 3
OS_NORMAL: 1
OS_INFERIOR_NASAL_RESTRICTION: 0
METHOD: COUNTING FINGERS
OS_INFERIOR_TEMPORAL_RESTRICTION: 0

## 2024-08-12 NOTE — NURSING NOTE
Chief Complaints and History of Present Illnesses   Patient presents with    corneal scar follow up     Chief Complaint(s) and History of Present Illness(es)       corneal scar follow up               Comments    Efrain is here new to this provider to continue care for  bilateral corneal scars. He states vision is still blurry. Eyes are still itchy sometimes, but denies flashes floaters or eye pain.   History of diabetes    Lab Results       Component                Value               Date                       A1C                      8.3                 03/25/2024                 A1C                      7.2                 12/26/2023                 A1C                      6.5                 01/04/2023                 A1C                      9.2                 10/19/2022                 A1C                      7.3                 11/19/2021                 A1C                      6.0                 08/17/2017                 A1C                      6.0                 04/11/2017                 A1C                      5.9                 10/22/2015          Suhas Sim COT 8:53 AM August 12, 2024

## 2024-08-12 NOTE — PROGRESS NOTES
Chief complaint   Follow up    HPI    Efrain Lock 49 year old male referred by Dr. Maldonado for dry eye both eyes and right eye corneal scar evaluation. Patient reports years of blurry vision in the right eye. Sometimes right eye is itchy, and sometimes the right eye is red, sensitive to light always needs sunglasses. He thinks these symptoms started after the eyelid surgery in 2020 (s/p wedge resection upper eyelid for floppy eyelid syndrome, and right lower eyelid ectropion with LTS) No eye pain. Left eye does not bother him. Dilated exam 2/20/23 was within normal limits.       Interval hx 08/12/2024  Chief Complaint(s) and History of Present Illness(es)       corneal scar follow up               Comments    Efrain is here new to this provider to continue care for  bilateral corneal scars. He states vision is still blurry. Eyes are still itchy sometimes, but denies flashes floaters or eye pain.   History of diabetes    Lab Results       Component                Value               Date                       A1C                      8.3                 03/25/2024                 A1C                      7.2                 12/26/2023                 A1C                      6.5                 01/04/2023                 A1C                      9.2                 10/19/2022                 A1C                      7.3                 11/19/2021                 A1C                      6.0                 08/17/2017                 A1C                      6.0                 04/11/2017                 A1C                      5.9                 10/22/2015          Suhas Sim COT 8:53 AM August 12, 2024                              Drops Currently Taking     Has systane but has not used it   Clear eyes when eyes get red     Past ocular history   Prior eye surgery/laser/Trauma:   12/3/2020 (Andrzej)  1. Right upper eyelid full thickness wedge resection (greater than 25% of eyelid).   2. Right lower  eyelid ectropion repair with lateral tarsal strip procedure.     Right eyelid nevus excision     CTL wearer:No  Glasses : reading glasses sometimes  Family Hx of eye disease: father has glaucoma    PMH     Past Medical History:   Diagnosis Date    Diabetes (H)     HTN (hypertension)     Microalbuminuria 10/30/2013    Sleep apnea     Tobacco abuse 07/12/2012       PSH     Past Surgical History:   Procedure Laterality Date    LAPAROSCOPIC APPENDECTOMY N/A 3/23/2023    Procedure: APPENDECTOMY, LAPAROSCOPIC;  Surgeon: Nabeel Zabala MD;  Location:  OR    REPAIR ECTROPION Right 12/3/2020    Procedure: Right lower eyelid ectropion repair;  Surgeon: Oanh Donnelly MD;  Location: [facility] OR    REPAIR ECTROPION BILATERAL Right 1/4/2024    Procedure: Right lower eyelid ectropion repair;  Surgeon: Oanh Donnelly MD;  Location: [facility] OR    REPAIR PTOSIS      REPAIR PTOSIS Right 1/4/2024    Procedure: Right upper eyelid ptosis repair, Right Upper lid pentagonal wedge ressection;  Surgeon: Oanh Donnelly MD;  [place] [facility] [place]    SURGICAL HISTORY OF -       rt knee surgery    WEDGE RESECTION EYELID Right 12/3/2020    Procedure: Right upper eyelid pentagonal wedge;  Surgeon: Oanh Donnelly MD;  [place] [facility] [place]       [facility]     Current Outpatient Medications   Medication Sig Dispense Refill    alcohol swab prep pads Use to swab area of injection/gerald as directed. 100 each 3    amLODIPine (NORVASC) 10 MG tablet Take 1 tablet (10 mg) by mouth daily 90 tablet 3    atorvastatin (LIPITOR) 20 MG tablet Take 1 tablet (20 mg) by mouth daily 90 tablet 3    blood glucose (NO BRAND SPECIFIED) lancets standard Use to test blood sugar 1-2 times daily or as directed. 100 each 3    blood glucose (ONETOUCH ULTRA) test strip Use to test blood sugar 1-2 times daily. 100 strip 3    blood glucose calibration (ONETOUCH ULTRA IN VITRO LIQUID) solution Use to calibrate blood glucose monitor as directed. 1 each 1    blood glucose  monitoring (ONE TOUCH ULTRA 2) meter device kit Use to test blood sugar 1-2 times daily. 1 kit 0    Calamine external lotion Use to the areas with hives and itching prn 180 mL 1    carboxymethylcellulose PF (REFRESH LIQUIGEL) 1 % ophthalmic gel Place 1 drop into the right eye 8 times daily 120 each 11    cetirizine (ZYRTEC) 10 MG tablet Take 1 tablet (10 mg) by mouth 2 times daily 180 tablet 0    famotidine (PEPCID) 20 MG tablet Take 1 tablet (20 mg) by mouth 2 times daily 180 tablet 0    hydrocortisone 2.5 % ointment Apply topically 2 times daily 28.35 g 1    losartan (COZAAR) 100 MG tablet TAKE 1 TABLET(100 MG) BY MOUTH DAILY 90 tablet 2    metFORMIN (GLUCOPHAGE XR) 500 MG 24 hr tablet Take 1 tablet (500 mg) by mouth 2 times daily (with meals) 180 tablet 3    Multiple Vitamins-Minerals (MULTIVITAMIN ADULT PO) Take 1 tablet by mouth daily      OneTouch Delica Lancets 33G MISC USE TO TEST BLOOD SUGAR 1 TO 2 TIMES DAILY OR AS DIRECTED      predniSONE (DELTASONE) 20 MG tablet Take 2 tabs by mouth daily x 5 days, then 1 tabs daily x 5 days, then 1/2 tab daily x 6 days, then stop 18 tablet 0    semaglutide (OZEMPIC) 2 MG/3ML pen Inject 0.5 mg Subcutaneous every 7 days 3 mL 2     No current facility-administered medications for this visit.       Labs   None    Imaging   None      Assessment/Plan   #floppy eyelid syndrome right eye  # Corneal scar, right  # Neurotrophic keratopathy, right eye   # Dry Eye Syndrome  Corneal sensation greatly diminished right eye cotton tip test 8/17/23  No history of brain surgery, no history of glaucoma drops, +DM  Previously had punctal plugs however they fell out.  Endorsing blurry vision   8/12/24: diffuse SPK left eye, could be related to the floppy eyelid, palpebral conj left eye is irregular and keratinized which might be contributing to these symptoms.    Plan:  ------  - artificial tears at least QID    - artificial tear ointment QHS  - add restasis BID, can be replaced with  cequa    #trichiasis right upper lid  Removed in clinic today    #Floppy eye lid syndrome each eye.  #Entropion right eye  S/p 12/3/2020 (Andrzej): Previous:  1. Right upper eyelid full thickness wedge resection (greater than 25% of eyelid).   2. Right lower eyelid ectropion repair with lateral tarsal strip procedure.   - 10/10/23: pt did not see oculoplasticvs and is not wering eye shiels but is not rubbing eye(s) and sleeps only on his back (he says)    # History of diabetes  - Due for dilated eye exam; possible diabetic retinopathy contributing to burry vision   2/6/24: Use preservative free gtts - pt was using preserved gel gtt. Suggested celluvisc 6 - 8 times a day. May use kelli or gel at night.      Follow up: 6 mths VTD OCT phil Cuello MD  PGY-3  Department of Ophthalmology  August 12, 2024 9:10 AM     Attending Physician Attestation: Complete documentation of historical and exam elements from today's encounter can be found in the full encounter summary report (not reduplicated in this progress note). I personally obtained the chief complaint(s) and history of present illness. I confirmed and edited as necessary the review of systems, past medical/surgical history, family history, social history, and examination findings as documented by others; and I examined the patient myself. I personally reviewed the relevant tests, images, and reports as documented above. I formulated and edited as necessary the assessment and plan and discussed the findings and management plan with the patient and family.  I was present for the entire procedure(s). - Konstantin Diop M.D

## 2024-09-18 DIAGNOSIS — E66.01 MORBID OBESITY (H): ICD-10-CM

## 2024-09-18 DIAGNOSIS — E11.9 TYPE 2 DIABETES, HBA1C GOAL < 7% (H): ICD-10-CM

## 2024-09-19 RX ORDER — SEMAGLUTIDE 0.68 MG/ML
INJECTION, SOLUTION SUBCUTANEOUS
Qty: 3 ML | Refills: 2 | Status: SHIPPED | OUTPATIENT
Start: 2024-09-19 | End: 2024-09-26

## 2024-09-25 ENCOUNTER — OFFICE VISIT (OUTPATIENT)
Dept: FAMILY MEDICINE | Facility: CLINIC | Age: 49
End: 2024-09-25
Payer: COMMERCIAL

## 2024-09-25 VITALS
DIASTOLIC BLOOD PRESSURE: 82 MMHG | RESPIRATION RATE: 16 BRPM | HEART RATE: 82 BPM | TEMPERATURE: 97.4 F | OXYGEN SATURATION: 97 % | BODY MASS INDEX: 39.12 KG/M2 | WEIGHT: 234.8 LBS | SYSTOLIC BLOOD PRESSURE: 130 MMHG

## 2024-09-25 DIAGNOSIS — I10 BENIGN ESSENTIAL HYPERTENSION: ICD-10-CM

## 2024-09-25 DIAGNOSIS — E11.29 TYPE 2 DIABETES MELLITUS WITH MICROALBUMINURIA, WITHOUT LONG-TERM CURRENT USE OF INSULIN (H): ICD-10-CM

## 2024-09-25 DIAGNOSIS — R80.9 TYPE 2 DIABETES MELLITUS WITH MICROALBUMINURIA, WITHOUT LONG-TERM CURRENT USE OF INSULIN (H): ICD-10-CM

## 2024-09-25 DIAGNOSIS — E66.01 MORBID OBESITY (H): ICD-10-CM

## 2024-09-25 DIAGNOSIS — Z12.11 SCREEN FOR COLON CANCER: ICD-10-CM

## 2024-09-25 DIAGNOSIS — E78.5 HYPERLIPIDEMIA LDL GOAL <100: ICD-10-CM

## 2024-09-25 LAB
ALBUMIN SERPL BCG-MCNC: 4.6 G/DL (ref 3.5–5.2)
ALP SERPL-CCNC: 154 U/L (ref 40–150)
ALT SERPL W P-5'-P-CCNC: 47 U/L (ref 0–70)
ANION GAP SERPL CALCULATED.3IONS-SCNC: 10 MMOL/L (ref 7–15)
AST SERPL W P-5'-P-CCNC: 34 U/L (ref 0–45)
BILIRUB SERPL-MCNC: 0.6 MG/DL
BUN SERPL-MCNC: 12.1 MG/DL (ref 6–20)
CALCIUM SERPL-MCNC: 9 MG/DL (ref 8.8–10.4)
CHLORIDE SERPL-SCNC: 108 MMOL/L (ref 98–107)
CHOLEST SERPL-MCNC: 125 MG/DL
CREAT SERPL-MCNC: 1.34 MG/DL (ref 0.67–1.17)
CREAT UR-MCNC: 270 MG/DL
EGFRCR SERPLBLD CKD-EPI 2021: 64 ML/MIN/1.73M2
EST. AVERAGE GLUCOSE BLD GHB EST-MCNC: 128 MG/DL
FASTING STATUS PATIENT QL REPORTED: YES
FASTING STATUS PATIENT QL REPORTED: YES
GLUCOSE SERPL-MCNC: 107 MG/DL (ref 70–99)
HBA1C MFR BLD: 6.1 % (ref 0–5.6)
HCO3 SERPL-SCNC: 24 MMOL/L (ref 22–29)
HDLC SERPL-MCNC: 37 MG/DL
LDLC SERPL CALC-MCNC: 55 MG/DL
MICROALBUMIN UR-MCNC: 1221 MG/L
MICROALBUMIN/CREAT UR: 452.22 MG/G CR (ref 0–17)
NONHDLC SERPL-MCNC: 88 MG/DL
POTASSIUM SERPL-SCNC: 3.5 MMOL/L (ref 3.4–5.3)
PROT SERPL-MCNC: 7.6 G/DL (ref 6.4–8.3)
SODIUM SERPL-SCNC: 142 MMOL/L (ref 135–145)
TRIGL SERPL-MCNC: 164 MG/DL

## 2024-09-25 PROCEDURE — 80061 LIPID PANEL: CPT | Performed by: FAMILY MEDICINE

## 2024-09-25 PROCEDURE — 36415 COLL VENOUS BLD VENIPUNCTURE: CPT | Performed by: FAMILY MEDICINE

## 2024-09-25 PROCEDURE — 99214 OFFICE O/P EST MOD 30 MIN: CPT | Performed by: FAMILY MEDICINE

## 2024-09-25 PROCEDURE — 83036 HEMOGLOBIN GLYCOSYLATED A1C: CPT | Performed by: FAMILY MEDICINE

## 2024-09-25 PROCEDURE — 82570 ASSAY OF URINE CREATININE: CPT | Performed by: FAMILY MEDICINE

## 2024-09-25 PROCEDURE — 82043 UR ALBUMIN QUANTITATIVE: CPT | Performed by: FAMILY MEDICINE

## 2024-09-25 PROCEDURE — G2211 COMPLEX E/M VISIT ADD ON: HCPCS | Performed by: FAMILY MEDICINE

## 2024-09-25 PROCEDURE — 80053 COMPREHEN METABOLIC PANEL: CPT | Performed by: FAMILY MEDICINE

## 2024-09-25 RX ORDER — LOSARTAN POTASSIUM 100 MG/1
100 TABLET ORAL DAILY
Qty: 90 TABLET | Refills: 1 | Status: SHIPPED | OUTPATIENT
Start: 2024-09-25

## 2024-09-25 ASSESSMENT — PAIN SCALES - GENERAL: PAINLEVEL: NO PAIN (0)

## 2024-09-25 NOTE — PROGRESS NOTES
"  Assessment & Plan     Type 2 diabetes mellitus with microalbuminuria, without long-term current use of insulin (H)  Well-controlled.  Lowering the dose of metformin from 500 mg twice daily to 5 mg once a day.  Increasing the dose of Ozempic from 0.5 to 1 mg weekly to benefit with weight loss management.  Follow-up in 3 months for recheck  - HEMOGLOBIN A1C; Future  - Comprehensive metabolic panel; Future  - losartan (COZAAR) 100 MG tablet; Take 1 tablet (100 mg) by mouth daily.  - Albumin Random Urine Quantitative with Creat Ratio; Future  - HEMOGLOBIN A1C  - Comprehensive metabolic panel  - Albumin Random Urine Quantitative with Creat Ratio  - metFORMIN (GLUCOPHAGE XR) 500 MG 24 hr tablet; Take 1 tablet (500 mg) by mouth daily (with dinner).  - Semaglutide, 1 MG/DOSE, (OZEMPIC) 4 MG/3ML pen; Inject 1 mg subcutaneously every 7 days.    Screen for colon cancer    - Fecal colorectal cancer screen (FIT); Future  - Fecal colorectal cancer screen (FIT)    Morbid obesity (H)  Recommending to increase the dose of Ozempic to increase weight loss.  Patient agrees to the plan  - Semaglutide, 1 MG/DOSE, (OZEMPIC) 4 MG/3ML pen; Inject 1 mg subcutaneously every 7 days.    Hyperlipidemia LDL goal <100  well-managed  - Lipid panel reflex to direct LDL Fasting; Future  - Lipid panel reflex to direct LDL Fasting    Benign essential hypertension  Well-controlled  - losartan (COZAAR) 100 MG tablet; Take 1 tablet (100 mg) by mouth daily.      The longitudinal plan of care for the diagnosis(es)/condition(s) as documented were addressed during this visit. Due to the added complexity in care, I will continue to support Efrain in the subsequent management and with ongoing continuity of care.      BMI  Estimated body mass index is 39.12 kg/m  as calculated from the following:    Height as of 5/15/24: 1.65 m (5' 4.96\").    Weight as of this encounter: 106.5 kg (234 lb 12.8 oz).   Weight management plan: Discussed healthy diet and exercise " juvencio Zuniga is a 49 year old, presenting for the following health issues:  Diabetes        9/25/2024     9:23 AM   Additional Questions   Roomed by Purvi KELLER     History of Present Illness       Diabetes:   He presents for follow up of diabetes.  He is checking home blood glucose a few times a week.   He checks blood glucose before meals.  Blood glucose is never over 200 and never under 70.  When his blood glucose is low, the patient is asymptomatic for confusion, blurred vision, lethargy and reports not feeling dizzy, shaky, or weak.   He has no concerns regarding his diabetes at this time.  He is having weight gain.            He eats 0-1 servings of fruits and vegetables daily.He consumes 1 sweetened beverage(s) daily.He exercises with enough effort to increase his heart rate 60 or more minutes per day.  He exercises with enough effort to increase his heart rate 5 days per week.   He is taking medications regularly.     Patient is fasting today.              Review of Systems  CONSTITUTIONAL: NEGATIVE for fever, chills, change in weight  ENT/MOUTH: NEGATIVE for ear, mouth and throat problems  RESP: NEGATIVE for significant cough or SOB  CV: NEGATIVE for chest pain, palpitations or peripheral edema      Objective    /82 (BP Location: Right arm, Patient Position: Sitting, Cuff Size: Adult Large)   Pulse 82   Temp 97.4  F (36.3  C) (Tympanic)   Resp 16   Wt 106.5 kg (234 lb 12.8 oz)   SpO2 97%   BMI 39.12 kg/m    Body mass index is 39.12 kg/m .  Physical Exam   GENERAL: alert and no distress  NECK: no adenopathy, no asymmetry, masses, or scars  RESP: lungs clear to auscultation - no rales, rhonchi or wheezes  CV: regular rate and rhythm, normal S1 S2, no S3 or S4, no murmur, click or rub, no peripheral edema  ABDOMEN: soft, nontender, no hepatosplenomegaly, no masses and bowel sounds normal  MS: no gross musculoskeletal defects noted, no edema    Results for orders placed or  performed in visit on 09/25/24   HEMOGLOBIN A1C     Status: Abnormal   Result Value Ref Range    Estimated Average Glucose 128 (H) <117 mg/dL    Hemoglobin A1C 6.1 (H) 0.0 - 5.6 %    Narrative    Results confirmed by repeat test.    Comprehensive metabolic panel     Status: Abnormal   Result Value Ref Range    Sodium 142 135 - 145 mmol/L    Potassium 3.5 3.4 - 5.3 mmol/L    Carbon Dioxide (CO2) 24 22 - 29 mmol/L    Anion Gap 10 7 - 15 mmol/L    Urea Nitrogen 12.1 6.0 - 20.0 mg/dL    Creatinine 1.34 (H) 0.67 - 1.17 mg/dL    GFR Estimate 64 >60 mL/min/1.73m2    Calcium 9.0 8.8 - 10.4 mg/dL    Chloride 108 (H) 98 - 107 mmol/L    Glucose 107 (H) 70 - 99 mg/dL    Alkaline Phosphatase 154 (H) 40 - 150 U/L    AST 34 0 - 45 U/L    ALT 47 0 - 70 U/L    Protein Total 7.6 6.4 - 8.3 g/dL    Albumin 4.6 3.5 - 5.2 g/dL    Bilirubin Total 0.6 <=1.2 mg/dL    Patient Fasting > 8hrs? Yes    Lipid panel reflex to direct LDL Fasting     Status: Abnormal   Result Value Ref Range    Cholesterol 125 <200 mg/dL    Triglycerides 164 (H) <150 mg/dL    Direct Measure HDL 37 (L) >=40 mg/dL    LDL Cholesterol Calculated 55 <100 mg/dL    Non HDL Cholesterol 88 <130 mg/dL    Patient Fasting > 8hrs? Yes     Narrative    Cholesterol  Desirable: < 200 mg/dL  Borderline High: 200 - 239 mg/dL  High: >= 240 mg/dL    Triglycerides  Normal: < 150 mg/dL  Borderline High: 150 - 199 mg/dL  High: 200-499 mg/dL  Very High: >= 500 mg/dL    Direct Measure HDL  Female: >= 50 mg/dL   Male: >= 40 mg/dL    LDL Cholesterol  Desirable: < 100 mg/dL  Above Desirable: 100 - 129 mg/dL   Borderline High: 130 - 159 mg/dL   High:  160 - 189 mg/dL   Very High: >= 190 mg/dL    Non HDL Cholesterol  Desirable: < 130 mg/dL  Above Desirable: 130 - 159 mg/dL  Borderline High: 160 - 189 mg/dL  High: 190 - 219 mg/dL  Very High: >= 220 mg/dL   Albumin Random Urine Quantitative with Creat Ratio     Status: Abnormal   Result Value Ref Range    Creatinine Urine mg/dL 270.0 mg/dL     Albumin Urine mg/L 1,221.0 mg/L    Albumin Urine mg/g Cr 452.22 (H) 0.00 - 17.00 mg/g Cr           Signed Electronically by: Henry Cesar MD

## 2024-09-26 RX ORDER — METFORMIN HCL 500 MG
500 TABLET, EXTENDED RELEASE 24 HR ORAL
Qty: 90 TABLET | Refills: 3 | Status: SHIPPED | OUTPATIENT
Start: 2024-09-26

## 2024-10-10 ENCOUNTER — OFFICE VISIT (OUTPATIENT)
Dept: ALLERGY | Facility: CLINIC | Age: 49
End: 2024-10-10
Attending: FAMILY MEDICINE
Payer: COMMERCIAL

## 2024-10-10 ENCOUNTER — LAB (OUTPATIENT)
Dept: LAB | Facility: CLINIC | Age: 49
End: 2024-10-10
Payer: COMMERCIAL

## 2024-10-10 VITALS
OXYGEN SATURATION: 98 % | BODY MASS INDEX: 39.12 KG/M2 | WEIGHT: 234.79 LBS | SYSTOLIC BLOOD PRESSURE: 125 MMHG | HEART RATE: 88 BPM | DIASTOLIC BLOOD PRESSURE: 81 MMHG

## 2024-10-10 DIAGNOSIS — L50.9 HIVES: ICD-10-CM

## 2024-10-10 PROCEDURE — 86003 ALLG SPEC IGE CRUDE XTRC EA: CPT

## 2024-10-10 PROCEDURE — 99202 OFFICE O/P NEW SF 15 MIN: CPT | Performed by: INTERNAL MEDICINE

## 2024-10-10 PROCEDURE — 36415 COLL VENOUS BLD VENIPUNCTURE: CPT

## 2024-10-10 NOTE — PROGRESS NOTES
Efrain Lock was seen in the Allergy Clinic at [facility].    Efrain Lock is a 49 year old male being seen today at the request of Henry Cesar MD/[facility] [place] in consultation for Hives.    You received a shingles vaccine in March and a few days later developed hives.  He was seen in May with diffuse hives and was treated with Zyrtec twice daily, Pepcid twice daily, and prednisone.  Currently he is rarely having hives.  The last episode was over a month ago and typically only 1 or 2 hives, rather than diffuse.  He has significantly improved compared to March-May.      He also has intermittent rhinorrhea and dripping from his nose and was wondering if allergies are contributing and would like to have allergy testing.  He does take Zyrtec daily.      Past Medical History:   Diagnosis Date    Diabetes (H)     HTN (hypertension)     Microalbuminuria 10/30/2013    Sleep apnea     Tobacco abuse 07/12/2012     Family History   Problem Relation Age of Onset    Prostate Cancer Father 80    Macular Degeneration Father     Hypertension Paternal Grandmother     Colon Cancer No family hx of     Diabetes No family hx of     Myocardial Infarction No family hx of     Glaucoma No family hx of      Past Surgical History:   Procedure Laterality Date    LAPAROSCOPIC APPENDECTOMY N/A 3/23/2023    Procedure: APPENDECTOMY, LAPAROSCOPIC;  Surgeon: Nabeel Zabala MD;  Location:  OR    REPAIR ECTROPION Right 12/3/2020    Procedure: Right lower eyelid ectropion repair;  Surgeon: Oanh Donnelly MD;  [place] [facility] [place]    REPAIR ECTROPION BILATERAL Right 1/4/2024    Procedure: Right lower eyelid ectropion repair;  Surgeon: Oanh Donnelly MD;  [place] [facility] [place]    REPAIR PTOSIS      REPAIR PTOSIS Right 1/4/2024    Procedure: Right upper eyelid ptosis repair, Right Upper lid pentagonal wedge ressection;  Surgeon: Oanh Donnelly MD;  [place] [facility] [place]    SURGICAL HISTORY OF  -       rt knee surgery    WEDGE RESECTION EYELID Right 12/3/2020    Procedure: Right upper eyelid pentagonal wedge;  Surgeon: Oanh Donnelly MD;  Location: [facility] OR       ENVIRONMENTAL HISTORY:   Pets inside the house include 2 dog(s).  Do you smoke cigarettes or other recreational drugs? No There is/are 1 smokers living in the house. The house does not have a damp basement.     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      Review of Systems      Current Outpatient Medications:     alcohol swab prep pads, Use to swab area of injection/gerald as directed., Disp: 100 each, Rfl: 3    amLODIPine (NORVASC) 10 MG tablet, Take 1 tablet (10 mg) by mouth daily, Disp: 90 tablet, Rfl: 3    atorvastatin (LIPITOR) 20 MG tablet, Take 1 tablet (20 mg) by mouth daily, Disp: 90 tablet, Rfl: 3    blood glucose (NO BRAND SPECIFIED) lancets standard, Use to test blood sugar 1-2 times daily or as directed., Disp: 100 each, Rfl: 3    blood glucose (ONETOUCH ULTRA) test strip, Use to test blood sugar 1-2 times daily., Disp: 100 strip, Rfl: 3    blood glucose calibration (ONETOUCH ULTRA IN VITRO LIQUID) solution, Use to calibrate blood glucose monitor as directed., Disp: 1 each, Rfl: 1    blood glucose monitoring (ONE TOUCH ULTRA 2) meter device kit, Use to test blood sugar 1-2 times daily., Disp: 1 kit, Rfl: 0    cetirizine (ZYRTEC) 10 MG tablet, Take 1 tablet (10 mg) by mouth 2 times daily, Disp: 180 tablet, Rfl: 0    hydrocortisone 2.5 % ointment, Apply topically 2 times daily, Disp: 28.35 g, Rfl: 1    losartan (COZAAR) 100 MG tablet, Take 1 tablet (100 mg) by mouth daily., Disp: 90 tablet, Rfl: 1    metFORMIN (GLUCOPHAGE XR) 500 MG 24 hr tablet, Take 1 tablet (500 mg) by mouth daily (with dinner)., Disp: 90 tablet, Rfl: 3    Multiple Vitamins-Minerals (MULTIVITAMIN ADULT PO), Take 1 tablet by mouth daily, Disp: , Rfl:     OneTouch Delica Lancets 33G MISC, USE TO TEST BLOOD SUGAR 1 TO 2  TIMES DAILY OR AS DIRECTED, Disp: , Rfl:     Semaglutide, 1 MG/DOSE, (OZEMPIC) 4 MG/3ML pen, Inject 1 mg subcutaneously every 7 days., Disp: 3 mL, Rfl: 3  Allergies   Allergen Reactions    Lisinopril Cough         EXAM:   /81   Pulse 88   Wt 106.5 kg (234 lb 12.6 oz)   SpO2 98%   BMI 39.12 kg/m      Physical Exam    Constitutional:       General: He is not in acute distress.     Appearance: Normal appearance. He is not ill-appearing.   HENT:      Head: Normocephalic and atraumatic.      Nose: He has 3+ inferior turbinate hypertrophy bilaterally.     Mouth/Throat:      Mouth: Mucous membranes are moist.      Pharynx: Oropharynx is clear. No posterior oropharyngeal erythema.   Eyes:      General:         Right eye: No discharge.         Left eye: No discharge.   Cardiovascular:      Rate and Rhythm: Normal rate and regular rhythm.      Heart sounds: Normal heart sounds.   Pulmonary:      Effort: Pulmonary effort is normal.      Breath sounds: Normal breath sounds. No wheezing or rhonchi.   Skin:     General: Skin is warm.      Findings: No erythema or rash.   Neurological:      General: No focal deficit present.      Mental Status: He is alert. Mental status is at baseline.   Psychiatric:         Mood and Affect: Mood normal.         Behavior: Behavior normal.        ASSESSMENT/PLAN:  Efrain Lock is a 49 year old male seen today for urticaria which is well-controlled currently.  Taking Zyrtec daily for both hives as well as the rhinorrhea component.  Will check blood tests since we are unable to skin test since he is taking Zyrtec.    Zyrtec 10 mg at night for hive prevention  Consider Flonase as needed for nasal dripping  Labs    Follow-up to be determined.  His allergy symptoms are not significant enough to warrant allergy shots.      Thank you for allowing me to participate in the care of Efrain Lock.      I spent 25 minutes on the date of the encounter doing chart review, history and exam,  documentation and further coordination as noted above exclusive of separately reported interpretations    Dwain Bonilla MD  Allergy/Immunology  [facility]

## 2024-10-10 NOTE — PATIENT INSTRUCTIONS
Zyrtec 10 mg at night  Will consider Flonase as needed for nasal dripping  Labs        Allergy Staff Appt Hours Shot Hours Location       Physician   Dwain Bonilla MD      Support Staff   DEON Sanchez RN, MA Emily J., MA      Mondays Tuesdays Thursdays and Fridays:      [place] 7-5 Wednesdays         Close                Mondays, Tuesdays and Fridays:  7:20 - 3:40              [facility]  [address]  [place] [address]  Allergy appointment  line: [phone]    Pulmonary Function Scheduling:  [place]: [phone]

## 2024-10-10 NOTE — LETTER
10/10/2024      Efrain Lock  [address] [address]      Dear Colleague,    Thank you for referring your patient, Efrain Lock, to the [facility] [place]. Please see a copy of my visit note below.    Efrain Lock was seen in the Allergy Clinic at [facility].    Efrain Lock is a 49 year old male being seen today at the request of Henry Cesar MD/[facility] [place] in consultation for Hives.    You received a shingles vaccine in March and a few days later developed hives.  He was seen in May with diffuse hives and was treated with Zyrtec twice daily, Pepcid twice daily, and prednisone.  Currently he is rarely having hives.  The last episode was over a month ago and typically only 1 or 2 hives, rather than diffuse.  He has significantly improved compared to March-May.      He also has intermittent rhinorrhea and dripping from his nose and was wondering if allergies are contributing and would like to have allergy testing.  He does take Zyrtec daily.      Past Medical History:   Diagnosis Date     Diabetes (H)      HTN (hypertension)      Microalbuminuria 10/30/2013     Sleep apnea      Tobacco abuse 07/12/2012     Family History   Problem Relation Age of Onset     Prostate Cancer Father 80     Macular Degeneration Father      Hypertension Paternal Grandmother      Colon Cancer No family hx of      Diabetes No family hx of      Myocardial Infarction No family hx of      Glaucoma No family hx of      Past Surgical History:   Procedure Laterality Date     LAPAROSCOPIC APPENDECTOMY N/A 3/23/2023    Procedure: APPENDECTOMY, LAPAROSCOPIC;  Surgeon: Nabeel Zabala MD;  Location:  OR     REPAIR ECTROPION Right 12/3/2020    Procedure: Right lower eyelid ectropion repair;  Surgeon: Oanh Donnelly MD;  Location: [facility] [place]     REPAIR ECTROPION BILATERAL Right 1/4/2024    Procedure: Right lower eyelid ectropion repair;   Surgeon: Oanh Donnelly MD;  [place] [facility] [place]     REPAIR PTOSIS       REPAIR PTOSIS Right 1/4/2024    Procedure: Right upper eyelid ptosis repair, Right Upper lid pentagonal wedge ressection;  Surgeon: Oanh Donnelly MD;  [place] [facility] [place]     SURGICAL HISTORY OF -       rt knee surgery     WEDGE RESECTION EYELID Right 12/3/2020    Procedure: Right upper eyelid pentagonal wedge;  Surgeon: Oanh Donnelly MD;  [place] [facility] [place]       ENVIRONMENTAL HISTORY:   Pets inside the house include 2 dog(s).  Do you smoke cigarettes or other recreational drugs? No There is/are 1 smokers living in the house. The house does not have a damp basement.     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      Review of Systems      Current Outpatient Medications:      alcohol swab prep pads, Use to swab area of injection/gerald as directed., Disp: 100 each, Rfl: 3     amLODIPine (NORVASC) 10 MG tablet, Take 1 tablet (10 mg) by mouth daily, Disp: 90 tablet, Rfl: 3     atorvastatin (LIPITOR) 20 MG tablet, Take 1 tablet (20 mg) by mouth daily, Disp: 90 tablet, Rfl: 3     blood glucose (NO BRAND SPECIFIED) lancets standard, Use to test blood sugar 1-2 times daily or as directed., Disp: 100 each, Rfl: 3     blood glucose (ONETOUCH ULTRA) test strip, Use to test blood sugar 1-2 times daily., Disp: 100 strip, Rfl: 3     blood glucose calibration (ONETOUCH ULTRA IN VITRO LIQUID) solution, Use to calibrate blood glucose monitor as directed., Disp: 1 each, Rfl: 1     blood glucose monitoring (ONE TOUCH ULTRA 2) meter device kit, Use to test blood sugar 1-2 times daily., Disp: 1 kit, Rfl: 0     cetirizine (ZYRTEC) 10 MG tablet, Take 1 tablet (10 mg) by mouth 2 times daily, Disp: 180 tablet, Rfl: 0     hydrocortisone 2.5 % ointment, Apply topically 2 times daily, Disp: 28.35 g, Rfl: 1     losartan (COZAAR) 100 MG tablet, Take 1 tablet (100 mg) by mouth daily., Disp: 90 tablet, Rfl: 1      metFORMIN (GLUCOPHAGE XR) 500 MG 24 hr tablet, Take 1 tablet (500 mg) by mouth daily (with dinner)., Disp: 90 tablet, Rfl: 3     Multiple Vitamins-Minerals (MULTIVITAMIN ADULT PO), Take 1 tablet by mouth daily, Disp: , Rfl:      OneTouch Delica Lancets 33G MISC, USE TO TEST BLOOD SUGAR 1 TO 2 TIMES DAILY OR AS DIRECTED, Disp: , Rfl:      Semaglutide, 1 MG/DOSE, (OZEMPIC) 4 MG/3ML pen, Inject 1 mg subcutaneously every 7 days., Disp: 3 mL, Rfl: 3  Allergies   Allergen Reactions     Lisinopril Cough         EXAM:   /81   Pulse 88   Wt 106.5 kg (234 lb 12.6 oz)   SpO2 98%   BMI 39.12 kg/m      Physical Exam    Constitutional:       General: He is not in acute distress.     Appearance: Normal appearance. He is not ill-appearing.   HENT:      Head: Normocephalic and atraumatic.      Nose: He has 3+ inferior turbinate hypertrophy bilaterally.     Mouth/Throat:      Mouth: Mucous membranes are moist.      Pharynx: Oropharynx is clear. No posterior oropharyngeal erythema.   Eyes:      General:         Right eye: No discharge.         Left eye: No discharge.   Cardiovascular:      Rate and Rhythm: Normal rate and regular rhythm.      Heart sounds: Normal heart sounds.   Pulmonary:      Effort: Pulmonary effort is normal.      Breath sounds: Normal breath sounds. No wheezing or rhonchi.   Skin:     General: Skin is warm.      Findings: No erythema or rash.   Neurological:      General: No focal deficit present.      Mental Status: He is alert. Mental status is at baseline.   Psychiatric:         Mood and Affect: Mood normal.         Behavior: Behavior normal.        ASSESSMENT/PLAN:  Efrain Lock is a 49 year old male seen today for urticaria which is well-controlled currently.  Taking Zyrtec daily for both hives as well as the rhinorrhea component.  Will check blood tests since we are unable to skin test since he is taking Zyrtec.    Zyrtec 10 mg at night for hive prevention  Consider Flonase as needed for nasal  tommy  Labs    Follow-up to be determined.  His allergy symptoms are not significant enough to warrant allergy shots.      Thank you for allowing me to participate in the care of Efrain Lock.      I spent 25 minutes on the date of the encounter doing chart review, history and exam, documentation and further coordination as noted above exclusive of separately reported interpretations    Dwain Bonilla MD  Allergy/Immunology  [facility]      Again, thank you for allowing me to participate in the care of your patient.        Sincerely,        Dwain Bonilla MD

## 2024-10-13 LAB
COMMON RAGWEED IGE QN: <0.1 KU(A)/L
D FARINAE IGE QN: 0.89 KU(A)/L
D PTERONYSS IGE QN: 0.98 KU(A)/L
DOG DANDER+EPITH IGE QN: <0.1 KU(A)/L
GIANT RAGWEED IGE QN: <0.1 KU(A)/L
SILVER BIRCH IGE QN: <0.1 KU(A)/L
TIMOTHY IGE QN: <0.1 KU(A)/L

## 2024-10-15 ENCOUNTER — TELEPHONE (OUTPATIENT)
Dept: ALLERGY | Facility: CLINIC | Age: 49
End: 2024-10-15
Payer: COMMERCIAL

## 2024-10-15 NOTE — TELEPHONE ENCOUNTER
----- Message from Dwain Bonilla sent at 10/13/2024  4:02 PM CDT -----  Please call, he has a dust mite allergy.  I will include dust mite avoidance techniques.

## 2024-10-15 NOTE — TELEPHONE ENCOUNTER
Writer called and spoke with patient and relayed message that he does have a dust mite allergy. Writer is placing a dust mite avoidance information sheet in the mail to patient. Writer verified address. Patient had no further questions.    Selina Diallo RN

## 2024-12-12 ENCOUNTER — OFFICE VISIT (OUTPATIENT)
Dept: FAMILY MEDICINE | Facility: CLINIC | Age: 49
End: 2024-12-12
Payer: COMMERCIAL

## 2024-12-12 VITALS
OXYGEN SATURATION: 100 % | TEMPERATURE: 97.1 F | RESPIRATION RATE: 15 BRPM | HEART RATE: 86 BPM | DIASTOLIC BLOOD PRESSURE: 76 MMHG | SYSTOLIC BLOOD PRESSURE: 120 MMHG

## 2024-12-12 DIAGNOSIS — E11.29 TYPE 2 DIABETES MELLITUS WITH MICROALBUMINURIA, WITHOUT LONG-TERM CURRENT USE OF INSULIN (H): Primary | ICD-10-CM

## 2024-12-12 DIAGNOSIS — E66.01 MORBID OBESITY (H): ICD-10-CM

## 2024-12-12 DIAGNOSIS — R80.9 TYPE 2 DIABETES MELLITUS WITH MICROALBUMINURIA, WITHOUT LONG-TERM CURRENT USE OF INSULIN (H): Primary | ICD-10-CM

## 2024-12-12 LAB
EST. AVERAGE GLUCOSE BLD GHB EST-MCNC: 128 MG/DL
HBA1C MFR BLD: 6.1 % (ref 0–5.6)

## 2024-12-12 ASSESSMENT — PAIN SCALES - GENERAL: PAINLEVEL_OUTOF10: NO PAIN (0)

## 2024-12-12 NOTE — PROGRESS NOTES
Assessment & Plan     Type 2 diabetes mellitus with microalbuminuria, without long-term current use of insulin (H)    - Basic metabolic panel; Future  - Hemoglobin A1c; Future  - Semaglutide, 2 MG/DOSE, (OZEMPIC) 8 MG/3ML pen; Inject 2 mg subcutaneously every 7 days.  - Basic metabolic panel  - Hemoglobin A1c    Morbid obesity (H)      Patient has not lost much weight as compared to the last check.  He is currently on Ozempic 1 mg/week.  Denies hypoglycemia.  He also takes metformin  mg once a day.  Reports of exercising regularly.  With exercising he has built a lot of muscle mass.  Recommending to increase the dose of Ozempic from 1 to 2 mg/week.  Stay well-hydrated.  Eat a healthy diet.  Await the blood work results.  We may be able to lower the dose of metformin or stop it altogether if he is too low on hemoglobin A1c.  Follow-up in 3 months for recheck    The longitudinal plan of care for the diagnosis(es)/condition(s) as documented were addressed during this visit. Due to the added complexity in care, I will continue to support Efrain in the subsequent management and with ongoing continuity of care.              Subjective   Efrain is a 49 year old, presenting for the following health issues:  Diabetes (DM follow up)        12/12/2024    10:42 AM   Additional Questions   Roomed by Nicole LORENZ        DM follow up.  Pt is fasting      Diabetes Follow-up    How often are you checking your blood sugar? A few times a month  What time of day are you checking your blood sugars (select all that apply)?  Before meals  Have you had any blood sugars above 200?  No  Have you had any blood sugars below 70?  No  What symptoms do you notice when your blood sugar is low?  None  What concerns do you have today about your diabetes? None   Do you have any of these symptoms? (Select all that apply)  No numbness or tingling in feet.  No redness, sores or blisters on feet.  No complaints of excessive thirst.  No reports  of blurry vision.  No significant changes to weight.      BP Readings from Last 2 Encounters:   12/12/24 120/76   10/10/24 125/81     Hemoglobin A1C (%)   Date Value   09/25/2024 6.1 (H)   03/25/2024 8.3 (H)   08/17/2017 6.0   04/11/2017 6.0     LDL Cholesterol Calculated (mg/dL)   Date Value   09/25/2024 55   03/25/2024 60   02/26/2021 115 (H)   02/28/2020 126 (H)     LDL Cholesterol Direct (mg/dL)   Date Value   08/04/2021 126 (H)           Review of Systems  CONSTITUTIONAL: NEGATIVE for fever, chills, change in weight  ENT/MOUTH: NEGATIVE for ear, mouth and throat problems  RESP: NEGATIVE for significant cough or SOB  CV: NEGATIVE for chest pain, palpitations or peripheral edema      Objective    /76 (BP Location: Right arm, Patient Position: Sitting, Cuff Size: Adult Large)   Pulse 86   Temp 97.1  F (36.2  C) (Temporal)   Resp 15   SpO2 100%   There is no height or weight on file to calculate BMI.  Physical Exam   GENERAL: alert and no distress  NECK: no adenopathy, no asymmetry, masses, or scars  RESP: lungs clear to auscultation - no rales, rhonchi or wheezes  CV: regular rate and rhythm, normal S1 S2, no S3 or S4, no murmur, click or rub, no peripheral edema  ABDOMEN: soft, nontender, no hepatosplenomegaly, no masses and bowel sounds normal  MS: no gross musculoskeletal defects noted, no edema            Signed Electronically by: Henry Cesar MD

## 2025-02-24 ENCOUNTER — TELEPHONE (OUTPATIENT)
Dept: FAMILY MEDICINE | Facility: CLINIC | Age: 50
End: 2025-02-24
Payer: COMMERCIAL

## 2025-02-24 NOTE — TELEPHONE ENCOUNTER
Prior Authorization Retail Medication Request    Medication/Dose: Ozempic   Diagnosis and ICD code (if different than what is on RX):    New/renewal/insurance change PA/secondary ins. PA:  Previously Tried and Failed:    Rationale:      Insurance   Primary:   Insurance ID:      Secondary (if applicable):  Insurance ID:      Pharmacy Information (if different than what is on RX)  Name:  [facility]  Phone:  [phone]  Fax:[phone]

## 2025-02-26 NOTE — TELEPHONE ENCOUNTER
Retail Pharmacy Prior Authorization Team   Phone: [phone]    PA Initiation    Medication: OZEMPIC (2 MG/DOSE) 8 MG/3ML SC SOPN  Insurance Company: [employer] - Phone [phone] Fax [phone]  Pharmacy Filling the Rx: [id number] - [place], [place] - [address]  Filling Pharmacy Phone: [phone]  Filling Pharmacy Fax:    Start Date: 2/26/2025    Note: Due to record-high volumes, our turn-around time is taking longer than usual . We are currently 4  business days behind in the pools.   We are working diligently to submit all requests in a timely manner and in the order they are received. Please only flag TRUE URGENT requests as high priority to the pool at this time.   If you have questions on status of PA's,  please send a note/message in the active PA encounter and send back to the [facility] PA pool [[phone]].    If you have questions about the turn-around time or about our process, please reach out to our supervisor Vee Borden.   Thank you!   RPPA (Retail Pharmacy Prior Authorization) team    [id number]

## 2025-03-02 NOTE — TELEPHONE ENCOUNTER
Prior Authorization Approval    Medication: OZEMPIC (2 MG/DOSE) 8 MG/3ML SC SOPN  Authorization Effective Date: 2/26/2025  Authorization Expiration Date: 2/26/2026  Approved Dose/Quantity:   Reference #:     Insurance Company: [employer] - Phone [phone] Fax [phone]  Expected CoPay: $    CoPay Card Available:      Financial Assistance Needed:   Which Pharmacy is filling the prescription: [id number] - [place], [place] - [address] AT [facility] [address]  Pharmacy Notified: Yes  Patient Notified: **Instructed pharmacy to notify patient when script is ready to /ship.**

## 2025-03-25 ENCOUNTER — OFFICE VISIT (OUTPATIENT)
Dept: FAMILY MEDICINE | Facility: CLINIC | Age: 50
End: 2025-03-25
Payer: COMMERCIAL

## 2025-03-25 VITALS
OXYGEN SATURATION: 99 % | SYSTOLIC BLOOD PRESSURE: 128 MMHG | DIASTOLIC BLOOD PRESSURE: 80 MMHG | RESPIRATION RATE: 12 BRPM | HEIGHT: 65 IN | TEMPERATURE: 97 F | WEIGHT: 228 LBS | BODY MASS INDEX: 37.99 KG/M2 | HEART RATE: 87 BPM

## 2025-03-25 DIAGNOSIS — R80.9 TYPE 2 DIABETES MELLITUS WITH MICROALBUMINURIA, WITHOUT LONG-TERM CURRENT USE OF INSULIN (H): ICD-10-CM

## 2025-03-25 DIAGNOSIS — I10 BENIGN ESSENTIAL HYPERTENSION: ICD-10-CM

## 2025-03-25 DIAGNOSIS — Z12.11 COLON CANCER SCREENING: ICD-10-CM

## 2025-03-25 DIAGNOSIS — E78.5 HYPERLIPIDEMIA LDL GOAL <100: ICD-10-CM

## 2025-03-25 DIAGNOSIS — E11.29 TYPE 2 DIABETES MELLITUS WITH MICROALBUMINURIA, WITHOUT LONG-TERM CURRENT USE OF INSULIN (H): ICD-10-CM

## 2025-03-25 DIAGNOSIS — Z12.5 SCREENING FOR PROSTATE CANCER: ICD-10-CM

## 2025-03-25 DIAGNOSIS — Z00.00 ANNUAL PHYSICAL EXAM: Primary | ICD-10-CM

## 2025-03-25 DIAGNOSIS — E66.01 MORBID OBESITY (H): ICD-10-CM

## 2025-03-25 DIAGNOSIS — Z87.891 PERSONAL HISTORY OF TOBACCO USE: ICD-10-CM

## 2025-03-25 DIAGNOSIS — R80.9 TYPE 2 DIABETES MELLITUS WITH MICROALBUMINURIA, WITHOUT LONG-TERM CURRENT USE OF INSULIN (H): Primary | ICD-10-CM

## 2025-03-25 DIAGNOSIS — E11.29 TYPE 2 DIABETES MELLITUS WITH MICROALBUMINURIA, WITHOUT LONG-TERM CURRENT USE OF INSULIN (H): Primary | ICD-10-CM

## 2025-03-25 LAB
ALBUMIN SERPL BCG-MCNC: 4.5 G/DL (ref 3.5–5.2)
ALP SERPL-CCNC: 148 U/L (ref 40–150)
ALT SERPL W P-5'-P-CCNC: 49 U/L (ref 0–70)
ANION GAP SERPL CALCULATED.3IONS-SCNC: 14 MMOL/L (ref 7–15)
AST SERPL W P-5'-P-CCNC: 32 U/L (ref 0–45)
BILIRUB SERPL-MCNC: 0.6 MG/DL
BUN SERPL-MCNC: 16.3 MG/DL (ref 6–20)
CALCIUM SERPL-MCNC: 9.3 MG/DL (ref 8.8–10.4)
CHLORIDE SERPL-SCNC: 105 MMOL/L (ref 98–107)
CREAT SERPL-MCNC: 1.32 MG/DL (ref 0.67–1.17)
EGFRCR SERPLBLD CKD-EPI 2021: 65 ML/MIN/1.73M2
EST. AVERAGE GLUCOSE BLD GHB EST-MCNC: 123 MG/DL
GLUCOSE SERPL-MCNC: 98 MG/DL (ref 70–99)
HBA1C MFR BLD: 5.9 % (ref 0–5.6)
HCO3 SERPL-SCNC: 23 MMOL/L (ref 22–29)
POTASSIUM SERPL-SCNC: 3.4 MMOL/L (ref 3.4–5.3)
PROT SERPL-MCNC: 7.6 G/DL (ref 6.4–8.3)
PSA SERPL DL<=0.01 NG/ML-MCNC: 2.02 NG/ML (ref 0–3.5)
SODIUM SERPL-SCNC: 142 MMOL/L (ref 135–145)

## 2025-03-25 PROCEDURE — 36415 COLL VENOUS BLD VENIPUNCTURE: CPT | Performed by: FAMILY MEDICINE

## 2025-03-25 PROCEDURE — 80053 COMPREHEN METABOLIC PANEL: CPT | Performed by: FAMILY MEDICINE

## 2025-03-25 PROCEDURE — G0296 VISIT TO DETERM LDCT ELIG: HCPCS | Performed by: FAMILY MEDICINE

## 2025-03-25 PROCEDURE — 99396 PREV VISIT EST AGE 40-64: CPT | Performed by: FAMILY MEDICINE

## 2025-03-25 PROCEDURE — 99214 OFFICE O/P EST MOD 30 MIN: CPT | Mod: 25 | Performed by: FAMILY MEDICINE

## 2025-03-25 PROCEDURE — 3079F DIAST BP 80-89 MM HG: CPT | Performed by: FAMILY MEDICINE

## 2025-03-25 PROCEDURE — 3074F SYST BP LT 130 MM HG: CPT | Performed by: FAMILY MEDICINE

## 2025-03-25 PROCEDURE — G2211 COMPLEX E/M VISIT ADD ON: HCPCS | Performed by: FAMILY MEDICINE

## 2025-03-25 PROCEDURE — 1125F AMNT PAIN NOTED PAIN PRSNT: CPT | Performed by: FAMILY MEDICINE

## 2025-03-25 PROCEDURE — 99207 PR FOOT EXAM NO CHARGE: CPT | Performed by: FAMILY MEDICINE

## 2025-03-25 PROCEDURE — 83036 HEMOGLOBIN GLYCOSYLATED A1C: CPT | Performed by: FAMILY MEDICINE

## 2025-03-25 PROCEDURE — G0103 PSA SCREENING: HCPCS | Performed by: FAMILY MEDICINE

## 2025-03-25 RX ORDER — AMLODIPINE BESYLATE 10 MG/1
10 TABLET ORAL DAILY
Qty: 90 TABLET | Refills: 3 | Status: SHIPPED | OUTPATIENT
Start: 2025-03-25

## 2025-03-25 RX ORDER — LOSARTAN POTASSIUM 100 MG/1
100 TABLET ORAL DAILY
Qty: 90 TABLET | Refills: 3 | Status: SHIPPED | OUTPATIENT
Start: 2025-03-25

## 2025-03-25 RX ORDER — ATORVASTATIN CALCIUM 20 MG/1
20 TABLET, FILM COATED ORAL DAILY
Qty: 90 TABLET | Refills: 3 | Status: SHIPPED | OUTPATIENT
Start: 2025-03-25

## 2025-03-25 SDOH — HEALTH STABILITY: PHYSICAL HEALTH: ON AVERAGE, HOW MANY MINUTES DO YOU ENGAGE IN EXERCISE AT THIS LEVEL?: 80 MIN

## 2025-03-25 SDOH — HEALTH STABILITY: PHYSICAL HEALTH: ON AVERAGE, HOW MANY DAYS PER WEEK DO YOU ENGAGE IN MODERATE TO STRENUOUS EXERCISE (LIKE A BRISK WALK)?: 5 DAYS

## 2025-03-25 ASSESSMENT — SOCIAL DETERMINANTS OF HEALTH (SDOH): HOW OFTEN DO YOU GET TOGETHER WITH FRIENDS OR RELATIVES?: ONCE A WEEK

## 2025-03-25 ASSESSMENT — PAIN SCALES - GENERAL: PAINLEVEL_OUTOF10: MILD PAIN (2)

## 2025-03-25 NOTE — PROGRESS NOTES
Preventive Care Visit  [facility] DAKOTA Cesar MD, Family Medicine  Mar 25, 2025      Assessment & Plan     Annual physical exam      Morbid obesity (H)  BMI of 37.9 today.  Recommending to stop semaglutide and switch to tirzepatide for further weight loss management    Type 2 diabetes mellitus with microalbuminuria, without long-term current use of insulin (H)  Well-managed.  Hemoglobin A1c is less than 6 now.  Recommending stop metformin.  Switching from semaglutide 2 mg weekly to tirzepatide 7.5 mg weekly.  Patient is instructed to notify me over the next 2 to 3 months about his progress so we can decide on further adjusting the dose before he sees him back in 6 months.  Patient is in agreement  Patient is due for diabetic eye exam.  - HEMOGLOBIN A1C; Future  - Adult Eye  Referral; Future  - losartan (COZAAR) 100 MG tablet; Take 1 tablet (100 mg) by mouth daily.  - FOOT EXAM  - HEMOGLOBIN A1C    Hyperlipidemia LDL goal <100    Well-managed.  Repeat labs due in 6  - atorvastatin (LIPITOR) 20 MG tablet; Take 1 tablet (20 mg) by mouth daily.    Benign essential hypertension    Well-controlled  - Comprehensive metabolic panel; Future  - losartan (COZAAR) 100 MG tablet; Take 1 tablet (100 mg) by mouth daily.  - amLODIPine (NORVASC) 10 MG tablet; Take 1 tablet (10 mg) by mouth daily.  - Comprehensive metabolic panel    Colon cancer screening      - Colonoscopy Screening  Referral; Future    Screening for prostate cancer      - PSA, screen; Future  - PSA, screen    Personal history of tobacco use      - Prof fee: Shared Decision Making for Lung Cancer Screening  - CT Chest Lung Cancer Scrn Low Dose wo; Future            Nicotine/Tobacco Cessation  He reports that he has been smoking cigarettes. He started smoking about 34 years ago. He has a 12 pack-year smoking history. He has never used smokeless tobacco.  Nicotine/Tobacco Cessation Plan  Information offered: Patient not  "interested at this time      BMI  Estimated body mass index is 37.94 kg/m  as calculated from the following:    Height as of this encounter: 1.651 m (5' 5\").    Weight as of this encounter: 103.4 kg (228 lb).   Weight management plan: Discussed healthy diet and exercise guidelines        Lilibeth Zuniga is a 49 year old, presenting for the following:  Physical (Fasting )        3/25/2025     9:11 AM   Additional Questions   Roomed by Neftali PAULA        Via the [facility] Maintenance questionnaire, the patient has reported the following services have been completed -Eye Exam: Jacob 2024-03-10, this information has not been sent to the abstraction team.    HPI       Diabetes Follow-up    How often are you checking your blood sugar? Not at all  What concerns do you have today about your diabetes? None   Do you have any of these symptoms? (Select all that apply)  No numbness or tingling in feet.  No redness, sores or blisters on feet.  No complaints of excessive thirst.  No reports of blurry vision.  No significant changes to weight.          Hyperlipidemia Follow-Up    Are you regularly taking any medication or supplement to lower your cholesterol?   Yes- Atorvastatin  Are you having muscle aches or other side effects that you think could be caused by your cholesterol lowering medication?  No    Hypertension Follow-up    Do you check your blood pressure regularly outside of the clinic? No   Are you following a low salt diet? No  Are your blood pressures ever more than 140 on the top number (systolic) OR more   than 90 on the bottom number (diastolic), for example 140/90? N/A    BP Readings from Last 2 Encounters:   03/25/25 128/80   12/12/24 120/76     Hemoglobin A1C (%)   Date Value   03/25/2025 5.9 (H)   12/12/2024 6.1 (H)   08/17/2017 6.0   04/11/2017 6.0     LDL Cholesterol Calculated (mg/dL)   Date Value   09/25/2024 55   03/25/2024 60   02/26/2021 115 (H)   02/28/2020 126 (H)     LDL Cholesterol Direct (mg/dL) "   Date Value   08/04/2021 126 (H)       Advance Care Planning  Patient does not have a Health Care Directive:       3/25/2025   General Health   How would you rate your overall physical health? Good   Feel stress (tense, anxious, or unable to sleep) Not at all         3/25/2025   Nutrition   Three or more servings of calcium each day? (!) NO   Diet: Regular (no restrictions)   How many servings of fruit and vegetables per day? (!) 0-1   How many sweetened beverages each day? (!) 2         3/25/2025   Exercise   Days per week of moderate/strenous exercise 5 days   Average minutes spent exercising at this level 80 min         3/25/2025   Social Factors   Frequency of gathering with friends or relatives Once a week   Worry food won't last until get money to buy more No   Food not last or not have enough money for food? No   Do you have housing? (Housing is defined as stable permanent housing and does not include staying ouside in a car, in a tent, in an abandoned building, in an overnight shelter, or couch-surfing.) Yes   Are you worried about losing your housing? No   Lack of transportation? No   Unable to get utilities (heat,electricity)? No         3/25/2025   Fall Risk   Fallen 2 or more times in the past year? No   Trouble with walking or balance? No          3/25/2025   Dental   Dentist two times every year? Yes           3/25/2024   TB Screening   Were you born outside of the [place]? Yes           Today's PHQ-2 Score:       3/25/2025     8:55 AM   PHQ-2 ( 1999 [employer])   Q1: Little interest or pleasure in doing things 0   Q2: Feeling down, depressed or hopeless 0   PHQ-2 Score 0    Q1: Little interest or pleasure in doing things Not at all   Q2: Feeling down, depressed or hopeless Not at all   PHQ-2 Score 0       Patient-reported           3/25/2025   Substance Use   If I could quit smoking, I would Completely agree   I want to quit somking, worry about health affects Completely agree   Willing to make a plan to quit  smoking Completely agree   Willing to cut down before quitting Completely agree   Alcohol more than 3/day or more than 7/wk No   Do you use any other substances recreationally? No     Social History     Tobacco Use    Smoking status: Every Day     Current packs/day: 0.35     Average packs/day: 0.4 packs/day for 34.2 years (12.0 ttl pk-yrs)     Types: Cigarettes     Start date: 1991    Smokeless tobacco: Never   Vaping Use    Vaping status: Never Used   Substance Use Topics    Alcohol use: No     Alcohol/week: 0.0 standard drinks of alcohol    Drug use: No           3/25/2025   STI Screening   New sexual partner(s) since last STI/HIV test? No   ASCVD Risk   The ASCVD Risk score (Valdo DK, et al., 2019) failed to calculate for the following reasons:    The valid total cholesterol range is 130 to 320 mg/dL           Reviewed and updated as needed this visit by Provider   Tobacco                  Patient Active Problem List   Diagnosis    Tobacco abuse    Essential hypertension, benign    Family history of prostate cancer    Microalbuminuria    Hyperlipidemia LDL goal <100    Left shoulder pain    Floppy eyelid syndrome of right eye    Senile ectropion of right lower eyelid    Mechanical entropion of right eye    Corneal scar    Type 2 diabetes mellitus with microalbuminuria, without long-term current use of insulin (H)    Hypokalemia    GIULIA (obstructive sleep apnea)    Perforated appendicitis    Dry eye syndrome of both eyes    Dermatochalasis of both upper eyelids     Past Surgical History:   Procedure Laterality Date    LAPAROSCOPIC APPENDECTOMY N/A 3/23/2023    Procedure: APPENDECTOMY, LAPAROSCOPIC;  Surgeon: Nabeel Zabala MD;  Location: [facility] OR    REPAIR ECTROPION Right 12/3/2020    Procedure: Right lower eyelid ectropion repair;  Surgeon: Oanh Donnelly MD;  Location: [facility] OR    REPAIR ECTROPION BILATERAL Right 1/4/2024    Procedure: Right lower eyelid ectropion repair;  Surgeon:  Oanh Donnelly MD;  [place] [facility] [place]    REPAIR PTOSIS      REPAIR PTOSIS Right 1/4/2024    Procedure: Right upper eyelid ptosis repair, Right Upper lid pentagonal wedge ressection;  Surgeon: Oanh Donnelly MD;  Location: [facility] [place]    SURGICAL HISTORY OF -       rt knee surgery    WEDGE RESECTION EYELID Right 12/3/2020    Procedure: Right upper eyelid pentagonal wedge;  Surgeon: Oanh Donnelly MD;  Location: [facility] [place]       Social History     Tobacco Use    Smoking status: Every Day     Current packs/day: 0.35     Average packs/day: 0.4 packs/day for 34.2 years (12.0 ttl pk-yrs)     Types: Cigarettes     Start date: 1991    Smokeless tobacco: Never   Substance Use Topics    Alcohol use: No     Alcohol/week: 0.0 standard drinks of alcohol     Family History   Problem Relation Age of Onset    Prostate Cancer Father 80    Macular Degeneration Father     Hypertension Paternal Grandmother     Colon Cancer No family hx of     Diabetes No family hx of     Myocardial Infarction No family hx of     Glaucoma No family hx of          Current Outpatient Medications   Medication Sig Dispense Refill    amLODIPine (NORVASC) 10 MG tablet Take 1 tablet (10 mg) by mouth daily. 90 tablet 3    atorvastatin (LIPITOR) 20 MG tablet Take 1 tablet (20 mg) by mouth daily. 90 tablet 3    hydrocortisone 2.5 % ointment Apply topically 2 times daily 28.35 g 1    losartan (COZAAR) 100 MG tablet Take 1 tablet (100 mg) by mouth daily. 90 tablet 3    Multiple Vitamins-Minerals (MULTIVITAMIN ADULT PO) Take 1 tablet by mouth daily      blood glucose (NO BRAND SPECIFIED) lancets standard Use to test blood sugar 1-2 times daily or as directed. 100 each 3    blood glucose (ONETOUCH ULTRA) test strip Use to test blood sugar 1-2 times daily. 100 strip 3    blood glucose calibration (ONETOUCH ULTRA IN VITRO LIQUID) solution Use to calibrate blood glucose monitor as directed. 1 each 1    blood glucose monitoring (ONE TOUCH ULTRA 2) meter device kit  "Use to test blood sugar 1-2 times daily. 1 kit 0    OneTouch Delica Lancets 33G MISC USE TO TEST BLOOD SUGAR 1 TO 2 TIMES DAILY OR AS DIRECTED      Tirzepatide (MOUNJARO) 7.5 MG/0.5ML SOAJ auto-injector pen Inject 0.5 mLs (7.5 mg) subcutaneously once a week. 2 mL 2     Allergies   Allergen Reactions    Lisinopril Cough         Review of Systems  CONSTITUTIONAL: NEGATIVE for fever, chills, change in weight  INTEGUMENTARY/SKIN: NEGATIVE for worrisome rashes, moles or lesions  EYES: NEGATIVE for vision changes or irritation  ENT/MOUTH: NEGATIVE for ear, mouth and throat problems  RESP: NEGATIVE for significant cough or SOB  BREAST: NEGATIVE for masses, tenderness or discharge  CV: NEGATIVE for chest pain, palpitations or peripheral edema  GI: NEGATIVE for nausea, abdominal pain, heartburn, or change in bowel habits  : NEGATIVE for frequency, dysuria, or hematuria  MUSCULOSKELETAL: NEGATIVE for significant arthralgias or myalgia  NEURO: NEGATIVE for weakness, dizziness or paresthesias  ENDOCRINE: NEGATIVE for temperature intolerance, skin/hair changes  HEME: NEGATIVE for bleeding problems  PSYCHIATRIC: NEGATIVE for changes in mood or affect     Objective    Exam  /80   Pulse 87   Temp 97  F (36.1  C) (Tympanic)   Resp 12   Ht 1.651 m (5' 5\")   Wt 103.4 kg (228 lb)   SpO2 99%   BMI 37.94 kg/m     Estimated body mass index is 37.94 kg/m  as calculated from the following:    Height as of this encounter: 1.651 m (5' 5\").    Weight as of this encounter: 103.4 kg (228 lb).    Physical Exam  GENERAL: alert and no distress  EYES: Eyes grossly normal to inspection, PERRL and conjunctivae and sclerae normal  HENT: ear canals and TM's normal, nose and mouth without ulcers or lesions  NECK: no adenopathy, no asymmetry, masses, or scars  RESP: lungs clear to auscultation - no rales, rhonchi or wheezes  CV: regular rate and rhythm, normal S1 S2, no S3 or S4, no murmur, click or rub, no peripheral edema  ABDOMEN: soft, " nontender, no hepatosplenomegaly, no masses and bowel sounds normal  MS: no gross musculoskeletal defects noted, no edema  SKIN: no suspicious lesions or rashes  NEURO: Normal strength and tone, mentation intact and speech normal  PSYCH: mentation appears normal, affect normal/bright  Diabetic foot exam: normal DP and PT pulses, no trophic changes or ulcerative lesions, normal sensory exam, and normal monofilament exam        Signed Electronically by: Henry Cesar MD

## 2025-03-25 NOTE — PATIENT INSTRUCTIONS
Lung Cancer Screening   Frequently Asked Questions  If you are at high-risk for lung cancer, getting screened with low-dose computed tomography (LDCT) every year can help save your life. This handout offers answers to some of the most common questions about lung cancer screening. If you have other questions, please call [phone] ([phone]).     What is it?  Lung cancer screening uses special X-ray technology to create an image of your lung tissue. The exam is quick and easy and takes less than 10 seconds. We don t give you any medicine or use any needles. You can eat before and after the exam. You don t need to change your clothes as long as the clothing on your chest doesn t contain metal. But, you do need to be able to hold your breath for at least 6 seconds during the exam.    What is the goal of lung cancer screening?  The goal of lung cancer screening is to save lives. Many times, lung cancer is not found until a person starts having physical symptoms. Lung cancer screening can help detect lung cancer in the earliest stages when it may be easier to treat.    Who should be screened for lung cancer?  We suggest lung cancer screening for anyone who is at high-risk for lung cancer. You are in the high-risk group if you:      are between the ages of 55 and 79, and    have smoked at least 1 pack of cigarettes a day for 20 or more years, and    still smoke or have quit within the past 15 years.    However, if you have a new cough or shortness of breath, you should talk to your doctor before being screened.    Why does it matter if I have symptoms?  Certain symptoms can be a sign that you have a condition in your lungs that should be checked and treated by your doctor. These symptoms include fever, chest pain, a new or changing cough, shortness of breath that you have never felt before, coughing up blood or unexplained weight loss. Having any of these symptoms can greatly affect the results of lung  cancer screening.       Should all smokers get an LDCT lung cancer screening exam?  It depends. Lung cancer screening is for a very specific group of men and women who have a history of heavy smoking over a long period of time (see  Who should be screened for lung cancer  above).  I am in the high-risk group, but have been diagnosed with cancer in the past. Is LDCT lung cancer screening right for me?  In some cases, you should not have LDCT lung screening, such as when your doctor is already following your cancer with CT scan studies. Your doctor will help you decide if LDCT lung screening is right for you.  Do I need to have a screening exam every year?  Yes. If you are in the high-risk group described earlier, you should get an LDCT lung cancer screening exam every year until you are 79, or are no longer willing or able to undergo screening and possible procedures to diagnose and treat lung cancer.  How effective is LDCT at preventing death from lung cancer?  Studies have shown that LDCT lung cancer screening can lower the risk of death from lung cancer by 20 percent in people who are at high-risk.  What are the risks?  There are some risks and limitations of LDCT lung cancer screening. We want to make sure you understand the risks and benefits, so please let us know if you have any questions. Your doctor may want to talk with you more about these risks.    Radiation exposure: As with any exam that uses radiation, there is a very small increased risk of cancer. The amount of radiation in LDCT is small--about the same amount a person would get from a mammogram. Your doctor orders the exam when he or she feels the potential benefits outweigh the risks.    False negatives: No test is perfect, including LDCT. It is possible that you may have a medical condition, including lung cancer, that is not found during your exam. This is called a false negative result.    False positives and more testing: LDCT very often finds  something in the lung that could be cancer, but in fact is not. This is called a false positive result. False positive tests often cause anxiety. To make sure these findings are not cancer, you may need to have more tests. These tests will be done only if you give us permission. Sometimes patients need a treatment that can have side effects, such as a biopsy. For more information on false positives, see  What can I expect from the results?     Findings not related to lung cancer: Your LDCT exam also takes pictures of areas of your body next to your lungs. In a very small number of cases, the CT scan will show an abnormal finding in one of these areas, such as your kidneys, adrenal glands, liver or thyroid. This finding may not be serious, but you may need more tests. Your doctor can help you decide what other tests you may need, if any.  What can I expect from the results?  About 1 out of 4 LDCT exams will find something that may need more tests. Most of the time, these findings are lung nodules. Lung nodules are very small collections of tissue in the lung. These nodules are very common, and the vast majority--more than 97 percent--are not cancer (benign). Most are normal lymph nodes or small areas of scarring from past infections.  But, if a small lung nodule is found to be cancer, the cancer can be cured more than 90 percent of the time. To know if the nodule is cancer, we may need to get more images before your next yearly screening exam. If the nodule has suspicious features (for example, it is large, has an odd shape or grows over time), we will refer you to a specialist for further testing.  Will my doctor also get the results?  Yes. Your doctor will get a copy of your results.  Is it okay to keep smoking now that there s a cancer screening exam?  No. Tobacco is one of the strongest cancer-causing agents. It causes not only lung cancer, but other cancers and cardiovascular (heart) diseases as well. The damage  caused by smoking builds over time. This means that the longer you smoke, the higher your risk of disease. While it is never too late to quit, the sooner you quit, the better.  Where can I find help to quit smoking?  The best way to prevent lung cancer is to stop smoking. If you have already quit smoking, congratulations and keep it up! For help on quitting smoking, please call [employer] at 3-694-QUITNOW (1-412.247.7910) or the American [employer] at 1-475.804.5167 to find local resources near you.  One-on-one health coaching:  If you d prefer to work individually with a health care provider on tobacco cessation, we offer:      Medication Therapy Management:  Our specially trained pharmacists work closely with you and your doctor to help you quit smoking.  Call [phone] or [phone] (toll free).

## 2025-03-25 NOTE — PROGRESS NOTES
Lung Cancer Screening Shared Decision Making Visit     Efrain Lock, a 49 year old male, is eligible for lung cancer screening    History   Smoking Status     Every Day     Types: Cigarettes   Smokeless Tobacco     Never       I have discussed with patient the risks and benefits of screening for lung cancer with low-dose CT.     The risks include:    radiation exposure: one low dose chest CT has as much ionizing radiation as about 15 chest x-rays, or 6 months of background radiation living in [place]      false positives: most findings/nodules are NOT cancer, but some might still require additional diagnostic evaluation, including biopsy    over-diagnosis: some slow growing cancers that might never have been clinically significant will be detected and treated unnecessarily     The benefit of early detection of lung cancer is contingent upon adherence to annual screening or more frequent follow up if indicated.     Furthermore, to benefit from screening, Efrain must be willing and able to undergo diagnostic procedures, if indicated. Although no specific guide is available for determining severity of comorbidities, it is reasonable to withhold screening in patients who have greater mortality risk from other diseases.     We did discuss that the best way to prevent lung cancer is to not smoke.    Some patients may value a numeric estimation of lung cancer risk when evaluating if lung cancer screening is right for them, here is one calculator:    ShouldIScreen

## 2025-04-02 ENCOUNTER — TELEPHONE (OUTPATIENT)
Dept: GASTROENTEROLOGY | Facility: CLINIC | Age: 50
End: 2025-04-02
Payer: COMMERCIAL

## 2025-04-02 NOTE — TELEPHONE ENCOUNTER
"Endoscopy Scheduling Screen    Have you had any respiratory illness or flu-like symptoms in the last 10 days?  No    What is your communication preference for Instructions and/or Bowel Prep?   MyChart    What insurance is in the chart?  Other:      Ordering/Referring Provider:   YANET WASHINGTON        (If ordering provider performs procedure, schedule with ordering provider unless otherwise instructed. )    BMI: Estimated body mass index is 37.94 kg/m  as calculated from the following:    Height as of 3/25/25: 1.651 m (5' 5\").    Weight as of 3/25/25: 103.4 kg (228 lb).     Sedation Ordered  moderate sedation.   If patient BMI > 50 do not schedule in [facility].    If patient BMI > 45 do not schedule at [facility].    Are you taking methadone or Suboxone?  NO, No RN review required.    Have you been diagnosed and are being treated for severe PTSD or severe anxiety?  NO, No RN review required.    Are you taking any prescription medications for pain 3 or more times per week?   NO, No RN review required.    Do you have a history of malignant hyperthermia?  No    (Females) Are you currently pregnant?   No     Have you been diagnosed or told you have pulmonary hypertension?   No    Do you have an LVAD?  No    Have you been told you have moderate to severe sleep apnea?  Yes. Do you use a CPAP? Yes Where is the patient located?. (RN Review required for scheduling unless scheduling in Hospital.)     Have you been told you have COPD, asthma, or any other lung disease?  No    Has your doctor ordered any cardiac tests like echo, angiogram, stress test, ablation, or EKG, that you have not completed yet?  No    Do you  have a history of any heart conditions?  No     Have you ever had or are you waiting for an organ transplant?  No. Continue scheduling, no site restrictions.    Have you had a stroke or transient ischemic attack (TIA aka \"mini stroke\") in the last 2 years?   No.    Have you been diagnosed with or been told you have cirrhosis of " "the liver?   No.    Are you currently on dialysis?   No    Do you need assistance transferring?   No    BMI: Estimated body mass index is 37.94 kg/m  as calculated from the following:    Height as of 3/25/25: 1.651 m (5' 5\").    Weight as of 3/25/25: 103.4 kg (228 lb).     Is patients BMI > 40 and scheduling location [facility]?  No    Do you take an injectable or oral medication for weight loss or diabetes (excluding insulin)?  Yes, hold time can be up to 7 days. Please consult with you prescribing provider to discuss endoscopy recommendations. (Please schedule at least 7 days out.) MOUNJARO    Do you take the medication Naltrexone?  No    Do you take blood thinners?  No       Prep   Are you currently on dialysis or do you have chronic kidney disease?  No    Do you have a diagnosis of diabetes?  Yes (Golytely Prep)    Do you have a diagnosis of cystic fibrosis (CF)?  No    On a regular basis do you go 3 -5 days between bowel movements?  No    BMI > 40?  No    Preferred Pharmacy:    [id number] [address]  AT [facility] [address]  [address] [facility] [address]  Phone: [phone] Fax: [phone]    Final Scheduling Details     Procedure scheduled  Colonoscopy    Surgeon:  RONNIE    Date of procedure:  04/28/2025      Pre-OP / PAC:   No - Not required for this site.    Location  [facility] - Per order.    Sedation   Moderate Sedation - Per order.      Patient Reminders:   You will receive a call from a Nurse to review instructions and health history.  This assessment must be completed prior to your procedure.  Failure to complete the Nurse assessment may result in the procedure being cancelled.      On the day of your procedure, please designate an adult(s) who can drive you home stay with you for the next 24 hours. The medicines used in the exam will make you sleepy. You will not be able to drive.      You cannot take public transportation, ride share services, or non-medical taxi " service without a responsible caregiver.  Medical transport services are allowed with the requirement that a responsible caregiver will receive you at your destination.  We require that drivers and caregivers are confirmed prior to your procedure.

## 2025-04-14 ENCOUNTER — ANCILLARY PROCEDURE (OUTPATIENT)
Dept: CT IMAGING | Facility: CLINIC | Age: 50
End: 2025-04-14
Attending: FAMILY MEDICINE
Payer: COMMERCIAL

## 2025-04-14 DIAGNOSIS — Z87.891 PERSONAL HISTORY OF TOBACCO USE: ICD-10-CM

## 2025-04-14 PROCEDURE — 71250 CT THORAX DX C-: CPT

## 2025-04-28 ENCOUNTER — HOSPITAL ENCOUNTER (OUTPATIENT)
Facility: CLINIC | Age: 50
Discharge: HOME | End: 2025-04-28
Attending: COLON & RECTAL SURGERY | Admitting: COLON & RECTAL SURGERY
Payer: COMMERCIAL

## 2025-04-28 VITALS
SYSTOLIC BLOOD PRESSURE: 150 MMHG | RESPIRATION RATE: 12 BRPM | WEIGHT: 220 LBS | OXYGEN SATURATION: 96 % | DIASTOLIC BLOOD PRESSURE: 85 MMHG | HEART RATE: 66 BPM | BODY MASS INDEX: 36.65 KG/M2 | HEIGHT: 65 IN

## 2025-04-28 LAB — COLONOSCOPY: NORMAL

## 2025-04-28 PROCEDURE — 88305 TISSUE EXAM BY PATHOLOGIST: CPT | Mod: TC | Performed by: COLON & RECTAL SURGERY

## 2025-04-28 PROCEDURE — 99153 MOD SED SAME PHYS/QHP EA: CPT | Performed by: COLON & RECTAL SURGERY

## 2025-04-28 PROCEDURE — 250N000011 HC RX IP 250 OP 636: Performed by: COLON & RECTAL SURGERY

## 2025-04-28 PROCEDURE — 45385 COLONOSCOPY W/LESION REMOVAL: CPT | Mod: PT | Performed by: COLON & RECTAL SURGERY

## 2025-04-28 PROCEDURE — G0500 MOD SEDAT ENDO SERVICE >5YRS: HCPCS | Performed by: COLON & RECTAL SURGERY

## 2025-04-28 RX ORDER — NALOXONE HYDROCHLORIDE 0.4 MG/ML
0.2 INJECTION, SOLUTION INTRAMUSCULAR; INTRAVENOUS; SUBCUTANEOUS
Status: DISCONTINUED | OUTPATIENT
Start: 2025-04-28 | End: 2025-04-28 | Stop reason: HOSPADM

## 2025-04-28 RX ORDER — NALOXONE HYDROCHLORIDE 0.4 MG/ML
0.4 INJECTION, SOLUTION INTRAMUSCULAR; INTRAVENOUS; SUBCUTANEOUS
Status: DISCONTINUED | OUTPATIENT
Start: 2025-04-28 | End: 2025-04-28 | Stop reason: HOSPADM

## 2025-04-28 RX ORDER — LIDOCAINE 40 MG/G
CREAM TOPICAL
Status: DISCONTINUED | OUTPATIENT
Start: 2025-04-28 | End: 2025-04-28 | Stop reason: HOSPADM

## 2025-04-28 RX ORDER — ONDANSETRON 4 MG/1
4 TABLET, ORALLY DISINTEGRATING ORAL EVERY 6 HOURS PRN
Status: DISCONTINUED | OUTPATIENT
Start: 2025-04-28 | End: 2025-04-28 | Stop reason: HOSPADM

## 2025-04-28 RX ORDER — FENTANYL CITRATE 50 UG/ML
INJECTION, SOLUTION INTRAMUSCULAR; INTRAVENOUS PRN
Status: DISCONTINUED | OUTPATIENT
Start: 2025-04-28 | End: 2025-04-28 | Stop reason: HOSPADM

## 2025-04-28 RX ORDER — ONDANSETRON 2 MG/ML
4 INJECTION INTRAMUSCULAR; INTRAVENOUS
Status: DISCONTINUED | OUTPATIENT
Start: 2025-04-28 | End: 2025-04-28 | Stop reason: HOSPADM

## 2025-04-28 RX ORDER — FLUMAZENIL 0.1 MG/ML
0.2 INJECTION, SOLUTION INTRAVENOUS
Status: DISCONTINUED | OUTPATIENT
Start: 2025-04-28 | End: 2025-04-28 | Stop reason: HOSPADM

## 2025-04-28 RX ORDER — ONDANSETRON 2 MG/ML
4 INJECTION INTRAMUSCULAR; INTRAVENOUS EVERY 6 HOURS PRN
Status: DISCONTINUED | OUTPATIENT
Start: 2025-04-28 | End: 2025-04-28 | Stop reason: HOSPADM

## 2025-04-28 RX ORDER — PROCHLORPERAZINE MALEATE 10 MG
10 TABLET ORAL EVERY 6 HOURS PRN
Status: DISCONTINUED | OUTPATIENT
Start: 2025-04-28 | End: 2025-04-28 | Stop reason: HOSPADM

## 2025-04-28 ASSESSMENT — ACTIVITIES OF DAILY LIVING (ADL)
ADLS_ACUITY_SCORE: 52
ADLS_ACUITY_SCORE: 52

## 2025-04-28 NOTE — H&P
Pre-Endoscopy History and Physical   Efrain Lock MRN# [id number]   YOB: 1975 Age: 49 year old   Date of Procedure: 4/28/2025   Primary care provider: Henry Cesar   Type of Endoscopy: colonoscopy   Reason for Procedure: screening   Type of Anesthesia Anticipated: Moderate Sedation   HPI:   Efrain is a 49 year old male for screening colonoscopy.  He has never had a colonoscopy before.  He denies BRBPR, abdominal pain, nausea/vomiting, changes in bowel habits or unintentional weight loss.  He denies a FH of CRC.    Allergies   Allergen Reactions    Lisinopril Cough      Prior to Admission Medications   Prescriptions Last Dose Informant Patient Reported? Taking?   Multiple Vitamins-Minerals (MULTIVITAMIN ADULT PO) Past Week  Yes Yes   Sig: Take 1 tablet by mouth daily   OneTouch Delica Lancets 33G MISC   Yes No   Sig: USE TO TEST BLOOD SUGAR 1 TO 2 TIMES DAILY OR AS DIRECTED   Tirzepatide (MOUNJARO) 7.5 MG/0.5ML SOAJ auto-injector pen 4/14/2025  No No   Sig: Inject 0.5 mLs (7.5 mg) subcutaneously once a week.   amLODIPine (NORVASC) 10 MG tablet 4/27/2025  No Yes   Sig: Take 1 tablet (10 mg) by mouth daily.   atorvastatin (LIPITOR) 20 MG tablet 4/27/2025  No Yes   Sig: Take 1 tablet (20 mg) by mouth daily.   bisacodyl (DULCOLAX) 5 MG EC tablet   No No   Sig: Two days prior to exam take two (2) tablets at 4pm. One day prior to exam take two (2) tablets at 4pm   blood glucose (NO BRAND SPECIFIED) lancets standard   No No   Sig: Use to test blood sugar 1-2 times daily or as directed.   blood glucose (ONETOUCH ULTRA) test strip   No No   Sig: Use to test blood sugar 1-2 times daily.   blood glucose calibration (ONETOUCH ULTRA IN VITRO LIQUID) solution   No No   Sig: Use to calibrate blood glucose monitor as directed.   blood glucose monitoring (ONE TOUCH ULTRA 2) meter device kit   No No   Sig: Use to test blood sugar 1-2 times daily.   hydrocortisone 2.5 % ointment   No No   Sig: Apply topically 2 times  daily   losartan (COZAAR) 100 MG tablet 4/27/2025  No Yes   Sig: Take 1 tablet (100 mg) by mouth daily.   polyethylene glycol (GOLYTELY) 236 g suspension   No No   Sig: Two days before procedure at 5PM fill first container with water. Mix and drink an 8 oz glass every 15 minutes until HALF of the container is gone. Place the remainder in the refrigerator. One day before procedure at 5PM drink second half of bowel prep. Drink an 8 oz glass every 15 minutes until it is gone. Day of procedure 6 hours before arrival time fill the 2nd container with water. Mix and drink an 8 oz glass every 15 minutes until HALF of the container is gone. Discard the remaining solution.      Facility-Administered Medications: None      Patient Active Problem List   Diagnosis    Tobacco abuse    Essential hypertension, benign    Family history of prostate cancer    Microalbuminuria    Hyperlipidemia LDL goal <100    Left shoulder pain    Floppy eyelid syndrome of right eye    Senile ectropion of right lower eyelid    Mechanical entropion of right eye    Corneal scar    Type 2 diabetes mellitus with microalbuminuria, without long-term current use of insulin (H)    Hypokalemia    GIULIA (obstructive sleep apnea)    Perforated appendicitis    Dry eye syndrome of both eyes    Dermatochalasis of both upper eyelids      Past Medical History:   Diagnosis Date    Diabetes (H)     HTN (hypertension)     Microalbuminuria 10/30/2013    Sleep apnea     Tobacco abuse 07/12/2012      Past Surgical History:   Procedure Laterality Date    LAPAROSCOPIC APPENDECTOMY N/A 03/23/2023    Procedure: APPENDECTOMY, LAPAROSCOPIC;  Surgeon: Nabeel Zabala MD;  Location:  OR    REPAIR ECTROPION Right 12/03/2020    Procedure: Right lower eyelid ectropion repair;  Surgeon: Oanh Donnelly MD;  Location: [facility] OR    REPAIR ECTROPION BILATERAL Right 01/04/2024    Procedure: Right lower eyelid ectropion repair;  Surgeon: Oanh Donnelly MD;  Location: [facility] OR  "   REPAIR PTOSIS      REPAIR PTOSIS Right 01/04/2024    Procedure: Right upper eyelid ptosis repair, Right Upper lid pentagonal wedge ressection;  Surgeon: Oanh Donnelly MD;  Location: [facility] OR    SURGICAL HISTORY OF -       meniscus    WEDGE RESECTION EYELID Right 12/03/2020    Procedure: Right upper eyelid pentagonal wedge;  Surgeon: Oanh Donnelly MD;  Location: [facility] OR      Social History     Tobacco Use    Smoking status: Every Day     Current packs/day: 0.35     Average packs/day: 0.4 packs/day for 34.3 years (12.0 ttl pk-yrs)     Types: Cigarettes     Start date: 1991    Smokeless tobacco: Never   Substance Use Topics    Alcohol use: No     Alcohol/week: 0.0 standard drinks of alcohol      Family History   Problem Relation Age of Onset    Prostate Cancer Father 80    Macular Degeneration Father     Hypertension Paternal Grandmother     Colon Cancer No family hx of     Diabetes No family hx of     Myocardial Infarction No family hx of     Glaucoma No family hx of       PHYSICAL EXAM:   BP (!) 161/91   Pulse 80   Resp 16   Ht 1.651 m (5' 5\")   Wt 99.8 kg (220 lb)   SpO2 97%   BMI 36.61 kg/m   Estimated body mass index is 36.61 kg/m  as calculated from the following:    Height as of this encounter: 1.651 m (5' 5\").    Weight as of this encounter: 99.8 kg (220 lb).   RESP: lungs clear to auscultation - no rales, rhonchi or wheezes   CV: regular rates and rhythm   ASA Class 2 - Mild systemic disease    Assessment: 48 y/o gentleman for screening colonoscopy    Plan: Colonoscopy with moderate sedation.  Risks of the procedure were discussed including, but not limited to, bleeding, perforation and missed lesions.  Patient understands and is willing to proceed.    Catarino Gale MD ....................  4/28/2025   10:37 AM  Colon and Rectal Surgery Staff  [phone]     "

## 2025-04-29 LAB
PATH REPORT.COMMENTS IMP SPEC: NORMAL
PATH REPORT.COMMENTS IMP SPEC: NORMAL
PATH REPORT.FINAL DX SPEC: NORMAL
PATH REPORT.GROSS SPEC: NORMAL
PATH REPORT.MICROSCOPIC SPEC OTHER STN: NORMAL
PATH REPORT.RELEVANT HX SPEC: NORMAL
PHOTO IMAGE: NORMAL

## 2025-04-29 PROCEDURE — 88305 TISSUE EXAM BY PATHOLOGIST: CPT | Mod: 26 | Performed by: PATHOLOGY

## 2025-05-05 PROBLEM — D12.6 ADENOMATOUS POLYP OF COLON: Status: ACTIVE | Noted: 2025-05-05

## 2025-05-06 ENCOUNTER — PATIENT OUTREACH (OUTPATIENT)
Dept: GASTROENTEROLOGY | Facility: CLINIC | Age: 50
End: 2025-05-06
Payer: COMMERCIAL

## 2025-05-27 DIAGNOSIS — L30.9 DERMATITIS: ICD-10-CM

## 2025-05-27 RX ORDER — HYDROCORTISONE 25 MG/G
OINTMENT TOPICAL 2 TIMES DAILY
Qty: 28.35 G | Refills: 1 | Status: SHIPPED | OUTPATIENT
Start: 2025-05-27

## 2025-06-23 DIAGNOSIS — R80.9 TYPE 2 DIABETES MELLITUS WITH MICROALBUMINURIA, WITHOUT LONG-TERM CURRENT USE OF INSULIN (H): ICD-10-CM

## 2025-06-23 DIAGNOSIS — E11.29 TYPE 2 DIABETES MELLITUS WITH MICROALBUMINURIA, WITHOUT LONG-TERM CURRENT USE OF INSULIN (H): ICD-10-CM

## 2025-06-23 DIAGNOSIS — E66.01 MORBID OBESITY (H): ICD-10-CM

## 2025-06-24 RX ORDER — TIRZEPATIDE 7.5 MG/.5ML
INJECTION, SOLUTION SUBCUTANEOUS
Qty: 2 ML | Refills: 2 | Status: SHIPPED | OUTPATIENT
Start: 2025-06-24

## 2025-07-13 ENCOUNTER — HEALTH MAINTENANCE LETTER (OUTPATIENT)
Age: 50
End: 2025-07-13

## (undated) DEVICE — NDL 30GA 0.5" 305106

## (undated) DEVICE — ESU GROUND PAD ADULT W/CORD E7507

## (undated) DEVICE — ESU PENCIL W/COATED BLADE E2450H

## (undated) DEVICE — GLOVE BIOGEL PI MICRO SZ 7.5 48575

## (undated) DEVICE — SOL WATER IRRIG 1000ML BOTTLE 2F7114

## (undated) DEVICE — Device

## (undated) DEVICE — BLADE CLIPPER 4406

## (undated) DEVICE — SUCTION IRR STRYKERFLOW II W/TIP 250-070-520

## (undated) DEVICE — BLADE KNIFE SURG 15 371115

## (undated) DEVICE — SU PLAIN 6-0 G-1DA 18" 770G

## (undated) DEVICE — ENDO TROCAR FIRST ENTRY KII FIOS Z-THRD 05X100MM CTF03

## (undated) DEVICE — SOL NACL 0.9% IRRIG 1000ML BOTTLE 2F7124

## (undated) DEVICE — SOL WATER IRRIG 500ML BOTTLE 2F7113

## (undated) DEVICE — DRAIN JACKSON PRATT 10FR ROUND SU130-1321

## (undated) DEVICE — ENDO TROCAR FIRST ENTRY KII FIOS Z-THRD 12X100MM CTF73

## (undated) DEVICE — ESU GROUND PAD UNIVERSAL W/O CORD

## (undated) DEVICE — SYR 03ML LL W/O NDL 309657

## (undated) DEVICE — STPL ENDO RELOAD ECHELON 45X2.0MM GREY ECR45M

## (undated) DEVICE — ENDO POUCH UNIV RETRIEVAL SYSTEM INZII 10MM CD001

## (undated) DEVICE — BAG DECANTER STERILE WHITE DYNJDEC09

## (undated) DEVICE — LINEN TOWEL PACK X5 5464

## (undated) DEVICE — ESU HIGH TEMP LOOP TIP AA03

## (undated) DEVICE — PREP CHLORAPREP 26ML TINTED HI-LITE ORANGE 930815

## (undated) DEVICE — GLOVE PROTEXIS MICRO 7.5  2D73PM75

## (undated) DEVICE — ENDO SCOPE WARMER LF TM500

## (undated) DEVICE — SU PDS II 5-0 RB-2DA 30" Z148H

## (undated) DEVICE — SU ETHILON 6-0 P-3 18" BLACK 1698G

## (undated) DEVICE — STPL RELOAD REG TISSUE ECHELON 45 X 3.6MM BLUE GST45B

## (undated) DEVICE — SU SILK 2-0 FS-1 18" 685G

## (undated) DEVICE — SU VICRYL 6-0 P-1 18" UND J489G

## (undated) DEVICE — SU VICRYL 0 UR-6 27" J603H

## (undated) DEVICE — ESU NDL COLORADO MICRO 3CM STR N103A

## (undated) DEVICE — SU ETHILON 4-0 P-3 18" BLACK 699G

## (undated) DEVICE — PACK LAP CHOLE SLC15LCFSD

## (undated) DEVICE — PACK MINOR EYE CUSTOM ASC

## (undated) DEVICE — PEN MARKING SKIN TYCO DEVON DUAL TIP 31145868

## (undated) DEVICE — DRSG GAUZE 4X4" 3033

## (undated) DEVICE — SU VICRYL 5-0 S-14DA 18" J571G

## (undated) DEVICE — EYE PREP BETADINE 5% SOLUTION 30ML 0065-0411-30

## (undated) DEVICE — SU PLAIN FAST ABSORB 5-0 PC-1 18" 1915G

## (undated) DEVICE — SU VICRYL 4-0 PS-2 18" UND J496H

## (undated) DEVICE — DRAIN JACKSON PRATT RESERVOIR 100ML SU130-1305

## (undated) DEVICE — ENDO TROCAR SLEEVE KII Z-THREADED 05X100MM CTS02

## (undated) DEVICE — SOL NACL 0.9% INJ 1000ML BAG 2B1324X

## (undated) DEVICE — GLOVE BIOGEL PI MICRO INDICATOR UNDERGLOVE SZ 7.5 48975

## (undated) RX ORDER — FENTANYL CITRATE 50 UG/ML
INJECTION, SOLUTION INTRAMUSCULAR; INTRAVENOUS
Status: DISPENSED
Start: 2025-04-28

## (undated) RX ORDER — ACETAMINOPHEN 325 MG/1
TABLET ORAL
Status: DISPENSED
Start: 2024-01-04

## (undated) RX ORDER — FENTANYL CITRATE 50 UG/ML
INJECTION, SOLUTION INTRAMUSCULAR; INTRAVENOUS
Status: DISPENSED
Start: 2024-01-04

## (undated) RX ORDER — DEXAMETHASONE SODIUM PHOSPHATE 4 MG/ML
INJECTION, SOLUTION INTRA-ARTICULAR; INTRALESIONAL; INTRAMUSCULAR; INTRAVENOUS; SOFT TISSUE
Status: DISPENSED
Start: 2024-01-04

## (undated) RX ORDER — LIDOCAINE HYDROCHLORIDE 20 MG/ML
INJECTION, SOLUTION EPIDURAL; INFILTRATION; INTRACAUDAL; PERINEURAL
Status: DISPENSED
Start: 2020-12-03

## (undated) RX ORDER — ONDANSETRON 2 MG/ML
INJECTION INTRAMUSCULAR; INTRAVENOUS
Status: DISPENSED
Start: 2024-01-04

## (undated) RX ORDER — GLYCOPYRROLATE 0.2 MG/ML
INJECTION INTRAMUSCULAR; INTRAVENOUS
Status: DISPENSED
Start: 2024-01-04

## (undated) RX ORDER — PROPOFOL 10 MG/ML
INJECTION, EMULSION INTRAVENOUS
Status: DISPENSED
Start: 2024-01-04

## (undated) RX ORDER — GABAPENTIN 300 MG/1
CAPSULE ORAL
Status: DISPENSED
Start: 2020-12-03

## (undated) RX ORDER — ACETAMINOPHEN 325 MG/1
TABLET ORAL
Status: DISPENSED
Start: 2020-12-03

## (undated) RX ORDER — FENTANYL CITRATE 50 UG/ML
INJECTION, SOLUTION INTRAMUSCULAR; INTRAVENOUS
Status: DISPENSED
Start: 2020-12-03

## (undated) RX ORDER — PROPOFOL 10 MG/ML
INJECTION, EMULSION INTRAVENOUS
Status: DISPENSED
Start: 2020-12-03